# Patient Record
Sex: FEMALE | Race: WHITE | NOT HISPANIC OR LATINO
[De-identification: names, ages, dates, MRNs, and addresses within clinical notes are randomized per-mention and may not be internally consistent; named-entity substitution may affect disease eponyms.]

---

## 2017-01-06 ENCOUNTER — APPOINTMENT (OUTPATIENT)
Dept: VASCULAR SURGERY | Facility: CLINIC | Age: 77
End: 2017-01-06

## 2017-01-06 VITALS
HEART RATE: 83 BPM | WEIGHT: 180 LBS | BODY MASS INDEX: 28.25 KG/M2 | HEIGHT: 67 IN | SYSTOLIC BLOOD PRESSURE: 139 MMHG | DIASTOLIC BLOOD PRESSURE: 82 MMHG | TEMPERATURE: 99.1 F

## 2017-01-06 RX ORDER — HYDROCORTISONE 1 %
12 CREAM (GRAM) TOPICAL
Qty: 400 | Refills: 0 | Status: ACTIVE | COMMUNITY
Start: 2016-09-10

## 2017-01-06 RX ORDER — ALBUTEROL SULFATE 2.5 MG/3ML
(2.5 MG/3ML) SOLUTION RESPIRATORY (INHALATION)
Qty: 75 | Refills: 0 | Status: ACTIVE | COMMUNITY
Start: 2016-11-11

## 2017-01-06 RX ORDER — POTASSIUM CHLORIDE 1500 MG/1
20 TABLET, EXTENDED RELEASE ORAL
Qty: 90 | Refills: 0 | Status: ACTIVE | COMMUNITY
Start: 2016-08-28

## 2017-01-06 RX ORDER — CIPROFLOXACIN HYDROCHLORIDE 500 MG/1
500 TABLET, FILM COATED ORAL
Qty: 14 | Refills: 0 | Status: ACTIVE | COMMUNITY
Start: 2017-01-06 | End: 1900-01-01

## 2017-01-06 RX ORDER — FLUTICASONE PROPIONATE 110 UG/1
110 AEROSOL, METERED RESPIRATORY (INHALATION)
Qty: 12 | Refills: 0 | Status: ACTIVE | COMMUNITY
Start: 2017-01-05

## 2017-01-06 RX ORDER — METOPROLOL SUCCINATE 25 MG/1
25 TABLET, EXTENDED RELEASE ORAL
Qty: 90 | Refills: 0 | Status: ACTIVE | COMMUNITY
Start: 2016-08-28

## 2017-01-06 RX ORDER — FLUCONAZOLE 100 MG/1
100 TABLET ORAL
Qty: 3 | Refills: 0 | Status: ACTIVE | COMMUNITY
Start: 2016-09-10

## 2017-01-06 RX ORDER — DOXYCYCLINE HYCLATE 100 MG/1
100 CAPSULE ORAL
Qty: 20 | Refills: 0 | Status: DISCONTINUED | COMMUNITY
Start: 2016-09-10 | End: 2017-01-06

## 2017-01-06 RX ORDER — AZITHROMYCIN 250 MG/1
250 TABLET, FILM COATED ORAL
Qty: 2 | Refills: 0 | Status: DISCONTINUED | COMMUNITY
Start: 2016-11-01 | End: 2017-01-06

## 2017-01-11 ENCOUNTER — APPOINTMENT (OUTPATIENT)
Dept: VASCULAR SURGERY | Facility: CLINIC | Age: 77
End: 2017-01-11

## 2017-01-11 VITALS
HEIGHT: 67 IN | HEART RATE: 76 BPM | SYSTOLIC BLOOD PRESSURE: 129 MMHG | TEMPERATURE: 97.6 F | DIASTOLIC BLOOD PRESSURE: 78 MMHG | WEIGHT: 180 LBS | BODY MASS INDEX: 28.25 KG/M2

## 2017-01-11 DIAGNOSIS — L03.90 CELLULITIS, UNSPECIFIED: ICD-10-CM

## 2017-01-11 DIAGNOSIS — R60.0 LOCALIZED EDEMA: ICD-10-CM

## 2017-01-11 DIAGNOSIS — I83.009 VARICOSE VEINS OF UNSPECIFIED LOWER EXTREMITY WITH ULCER OF UNSPECIFIED SITE: ICD-10-CM

## 2017-01-18 ENCOUNTER — APPOINTMENT (OUTPATIENT)
Dept: VASCULAR SURGERY | Facility: CLINIC | Age: 77
End: 2017-01-18

## 2017-01-30 ENCOUNTER — APPOINTMENT (OUTPATIENT)
Dept: CT IMAGING | Facility: CLINIC | Age: 77
End: 2017-01-30

## 2017-01-30 ENCOUNTER — OUTPATIENT (OUTPATIENT)
Dept: OUTPATIENT SERVICES | Facility: HOSPITAL | Age: 77
LOS: 1 days | End: 2017-01-30
Payer: MEDICARE

## 2017-01-30 DIAGNOSIS — Z00.8 ENCOUNTER FOR OTHER GENERAL EXAMINATION: ICD-10-CM

## 2017-01-30 PROCEDURE — 71250 CT THORAX DX C-: CPT

## 2017-12-03 ENCOUNTER — INPATIENT (INPATIENT)
Facility: HOSPITAL | Age: 77
LOS: 1 days | Discharge: HOME | End: 2017-12-05
Attending: INTERNAL MEDICINE | Admitting: INTERNAL MEDICINE
Payer: MEDICARE

## 2017-12-03 VITALS
OXYGEN SATURATION: 98 % | DIASTOLIC BLOOD PRESSURE: 26 MMHG | HEART RATE: 80 BPM | RESPIRATION RATE: 16 BRPM | SYSTOLIC BLOOD PRESSURE: 78 MMHG | TEMPERATURE: 99 F

## 2017-12-03 DIAGNOSIS — I95.9 HYPOTENSION, UNSPECIFIED: ICD-10-CM

## 2017-12-03 LAB
ALBUMIN SERPL ELPH-MCNC: 2.9 G/DL — LOW (ref 3.3–5)
ALP SERPL-CCNC: 110 U/L — SIGNIFICANT CHANGE UP (ref 40–120)
ALT FLD-CCNC: 7 U/L — SIGNIFICANT CHANGE UP (ref 4–33)
APPEARANCE UR: CLEAR — SIGNIFICANT CHANGE UP
APTT BLD: 29.4 SEC — SIGNIFICANT CHANGE UP (ref 27.5–37.4)
AST SERPL-CCNC: 21 U/L — SIGNIFICANT CHANGE UP (ref 4–32)
BACTERIA # UR AUTO: SIGNIFICANT CHANGE UP
BASE EXCESS BLDV CALC-SCNC: 5.2 MMOL/L — SIGNIFICANT CHANGE UP
BASOPHILS # BLD AUTO: 0.01 K/UL — SIGNIFICANT CHANGE UP (ref 0–0.2)
BASOPHILS NFR BLD AUTO: 0.1 % — SIGNIFICANT CHANGE UP (ref 0–2)
BILIRUB SERPL-MCNC: 0.5 MG/DL — SIGNIFICANT CHANGE UP (ref 0.2–1.2)
BILIRUB UR-MCNC: NEGATIVE — SIGNIFICANT CHANGE UP
BLOOD GAS VENOUS - CREATININE: SIGNIFICANT CHANGE UP MG/DL (ref 0.5–1.3)
BLOOD UR QL VISUAL: NEGATIVE — SIGNIFICANT CHANGE UP
BUN SERPL-MCNC: 25 MG/DL — HIGH (ref 7–23)
CALCIUM SERPL-MCNC: 8.5 MG/DL — SIGNIFICANT CHANGE UP (ref 8.4–10.5)
CHLORIDE BLDV-SCNC: 101 MMOL/L — SIGNIFICANT CHANGE UP (ref 96–108)
CHLORIDE SERPL-SCNC: 99 MMOL/L — SIGNIFICANT CHANGE UP (ref 98–107)
CK MB BLD-MCNC: 1 NG/ML — SIGNIFICANT CHANGE UP (ref 1–4.7)
CK MB BLD-MCNC: SIGNIFICANT CHANGE UP (ref 0–2.5)
CK SERPL-CCNC: 30 U/L — SIGNIFICANT CHANGE UP (ref 25–170)
CO2 SERPL-SCNC: 29 MMOL/L — SIGNIFICANT CHANGE UP (ref 22–31)
COLOR SPEC: YELLOW — SIGNIFICANT CHANGE UP
CREAT SERPL-MCNC: 1.3 MG/DL — SIGNIFICANT CHANGE UP (ref 0.5–1.3)
EOSINOPHIL # BLD AUTO: 0.39 K/UL — SIGNIFICANT CHANGE UP (ref 0–0.5)
EOSINOPHIL NFR BLD AUTO: 4 % — SIGNIFICANT CHANGE UP (ref 0–6)
GAS PNL BLDV: 136 MMOL/L — SIGNIFICANT CHANGE UP (ref 136–146)
GLUCOSE BLDV-MCNC: 97 — SIGNIFICANT CHANGE UP (ref 70–99)
GLUCOSE SERPL-MCNC: 93 MG/DL — SIGNIFICANT CHANGE UP (ref 70–99)
GLUCOSE UR-MCNC: NEGATIVE — SIGNIFICANT CHANGE UP
HCO3 BLDV-SCNC: 27 MMOL/L — SIGNIFICANT CHANGE UP (ref 20–27)
HCT VFR BLD CALC: 36.4 % — SIGNIFICANT CHANGE UP (ref 34.5–45)
HCT VFR BLDV CALC: 35.6 % — SIGNIFICANT CHANGE UP (ref 34.5–45)
HGB BLD-MCNC: 11.5 G/DL — SIGNIFICANT CHANGE UP (ref 11.5–15.5)
HGB BLDV-MCNC: 11.6 G/DL — SIGNIFICANT CHANGE UP (ref 11.5–15.5)
IMM GRANULOCYTES # BLD AUTO: 0.04 # — SIGNIFICANT CHANGE UP
IMM GRANULOCYTES NFR BLD AUTO: 0.4 % — SIGNIFICANT CHANGE UP (ref 0–1.5)
INR BLD: 0.97 — SIGNIFICANT CHANGE UP (ref 0.88–1.17)
KETONES UR-MCNC: NEGATIVE — SIGNIFICANT CHANGE UP
LACTATE BLDV-MCNC: 2 MMOL/L — SIGNIFICANT CHANGE UP (ref 0.5–2)
LEUKOCYTE ESTERASE UR-ACNC: NEGATIVE — SIGNIFICANT CHANGE UP
LYMPHOCYTES # BLD AUTO: 1.12 K/UL — SIGNIFICANT CHANGE UP (ref 1–3.3)
LYMPHOCYTES # BLD AUTO: 11.5 % — LOW (ref 13–44)
MCHC RBC-ENTMCNC: 30 PG — SIGNIFICANT CHANGE UP (ref 27–34)
MCHC RBC-ENTMCNC: 31.6 % — LOW (ref 32–36)
MCV RBC AUTO: 95 FL — SIGNIFICANT CHANGE UP (ref 80–100)
MONOCYTES # BLD AUTO: 0.46 K/UL — SIGNIFICANT CHANGE UP (ref 0–0.9)
MONOCYTES NFR BLD AUTO: 4.7 % — SIGNIFICANT CHANGE UP (ref 2–14)
MUCOUS THREADS # UR AUTO: SIGNIFICANT CHANGE UP
NEUTROPHILS # BLD AUTO: 7.71 K/UL — HIGH (ref 1.8–7.4)
NEUTROPHILS NFR BLD AUTO: 79.3 % — HIGH (ref 43–77)
NITRITE UR-MCNC: NEGATIVE — SIGNIFICANT CHANGE UP
NRBC # FLD: 0 — SIGNIFICANT CHANGE UP
NT-PROBNP SERPL-SCNC: 394.5 PG/ML — SIGNIFICANT CHANGE UP
PCO2 BLDV: 54 MMHG — HIGH (ref 41–51)
PH BLDV: 7.37 PH — SIGNIFICANT CHANGE UP (ref 7.32–7.43)
PH UR: 6.5 — SIGNIFICANT CHANGE UP (ref 4.6–8)
PLATELET # BLD AUTO: 153 K/UL — SIGNIFICANT CHANGE UP (ref 150–400)
PMV BLD: 11.4 FL — SIGNIFICANT CHANGE UP (ref 7–13)
PO2 BLDV: 28 MMHG — LOW (ref 35–40)
POTASSIUM BLDV-SCNC: 3.8 MMOL/L — SIGNIFICANT CHANGE UP (ref 3.4–4.5)
POTASSIUM SERPL-MCNC: 4.3 MMOL/L — SIGNIFICANT CHANGE UP (ref 3.5–5.3)
POTASSIUM SERPL-SCNC: 4.3 MMOL/L — SIGNIFICANT CHANGE UP (ref 3.5–5.3)
PROT SERPL-MCNC: 6.4 G/DL — SIGNIFICANT CHANGE UP (ref 6–8.3)
PROT UR-MCNC: 10 — SIGNIFICANT CHANGE UP
PROTHROM AB SERPL-ACNC: 10.9 SEC — SIGNIFICANT CHANGE UP (ref 9.8–13.1)
RBC # BLD: 3.83 M/UL — SIGNIFICANT CHANGE UP (ref 3.8–5.2)
RBC # FLD: 15.2 % — HIGH (ref 10.3–14.5)
RBC CASTS # UR COMP ASSIST: SIGNIFICANT CHANGE UP (ref 0–?)
SAO2 % BLDV: 50.4 % — LOW (ref 60–85)
SODIUM SERPL-SCNC: 138 MMOL/L — SIGNIFICANT CHANGE UP (ref 135–145)
SP GR SPEC: 1.01 — SIGNIFICANT CHANGE UP (ref 1–1.03)
TROPONIN T SERPL-MCNC: < 0.06 NG/ML — SIGNIFICANT CHANGE UP (ref 0–0.06)
UROBILINOGEN FLD QL: NORMAL E.U. — SIGNIFICANT CHANGE UP (ref 0.1–0.2)
WBC # BLD: 9.73 K/UL — SIGNIFICANT CHANGE UP (ref 3.8–10.5)
WBC # FLD AUTO: 9.73 K/UL — SIGNIFICANT CHANGE UP (ref 3.8–10.5)
WBC UR QL: SIGNIFICANT CHANGE UP (ref 0–?)

## 2017-12-03 PROCEDURE — 71010: CPT | Mod: 26

## 2017-12-03 RX ORDER — FAMOTIDINE 10 MG/ML
20 INJECTION INTRAVENOUS ONCE
Qty: 0 | Refills: 0 | Status: COMPLETED | OUTPATIENT
Start: 2017-12-03 | End: 2017-12-03

## 2017-12-03 RX ORDER — DIPHENHYDRAMINE HCL 50 MG
25 CAPSULE ORAL ONCE
Qty: 0 | Refills: 0 | Status: COMPLETED | OUTPATIENT
Start: 2017-12-03 | End: 2017-12-03

## 2017-12-03 RX ORDER — SODIUM CHLORIDE 9 MG/ML
1000 INJECTION INTRAMUSCULAR; INTRAVENOUS; SUBCUTANEOUS ONCE
Qty: 0 | Refills: 0 | Status: DISCONTINUED | OUTPATIENT
Start: 2017-12-03 | End: 2017-12-03

## 2017-12-03 RX ORDER — SODIUM CHLORIDE 9 MG/ML
500 INJECTION INTRAMUSCULAR; INTRAVENOUS; SUBCUTANEOUS ONCE
Qty: 0 | Refills: 0 | Status: COMPLETED | OUTPATIENT
Start: 2017-12-03 | End: 2017-12-03

## 2017-12-03 RX ADMIN — Medication 100 MILLIGRAM(S): at 22:38

## 2017-12-03 RX ADMIN — Medication 25 MILLIGRAM(S): at 19:30

## 2017-12-03 RX ADMIN — SODIUM CHLORIDE 500 MILLILITER(S): 9 INJECTION INTRAMUSCULAR; INTRAVENOUS; SUBCUTANEOUS at 21:17

## 2017-12-03 RX ADMIN — FAMOTIDINE 20 MILLIGRAM(S): 10 INJECTION INTRAVENOUS at 19:30

## 2017-12-03 RX ADMIN — Medication 125 MILLIGRAM(S): at 19:30

## 2017-12-03 NOTE — ED PROVIDER NOTE - ATTENDING CONTRIBUTION TO CARE
Dr. Jj: I have personally performed a face to face bedside history and physical examination of this patient. I have discussed the history, examination, review of systems, assessment and plan of management with the resident. I have reviewed the electronic medical record and amended it to reflect my history, review of systems, physical exam, assessment and plan.    Attending Exam - Dr. Jj: GEN: well appearing, NAD  HEENT: +PERRL, EOMI  RESP: CTAB, no signs of resipiratory distress CV: s1s2 RRR ABD: soft/non tender/non distended  MSK: symmetric swelling to bilateral LE no deformities, normal range of motion, spine grossly normal NEURO: alert, non focal exam SKIN: diffuse blanching rash, non urticarial noted to extremities and torso.  Increased warmth over bilateral lower extremities.

## 2017-12-03 NOTE — ED PROVIDER NOTE - PROGRESS NOTE DETAILS
Gollogly: pt resting comfortably, reports pruritus has improved after benadryl. States rash has been improving over past few days. lungs clear and bnp not elevated - LE edema more likely 2/2 vascular insufficiency than CHF. Will give careful fluids. Patient's PMD aware of patient and that she is being admitted to hospital -- Анна

## 2017-12-03 NOTE — ED PROVIDER NOTE - MEDICAL DECISION MAKING DETAILS
76F with diffuse rash after exposure to IV contrast. Pt nontoxic appearing. No mucous membrane involvement. Rash likely allergic, not c/w anaphylaxis or SJS/TEN. Pt also with worsening swelling of legs, symmetric, likely 2/2 CHF. Presentation not consistent with DVT. Plan: ekg, cxr, labs, UA, antihistamines/steroids, reassess. 76F with diffuse rash after exposure to IV contrast. Pt nontoxic appearing. No mucous membrane involvement. Rash likely allergic, not c/w anaphylaxis or SJS/TEN. Pt also with worsening swelling of legs, symmetric, likely 2/2 CHF. Presentation not consistent with DVT. Plan: ekg, cxr, labs, UA, antihistamines/steroids, reassess.    Attending: Agree with above

## 2017-12-03 NOTE — ED PROVIDER NOTE - SKIN, MLM
Diffuse blanching macular rash on back/torso/arms/legs. No mucous membrane involvement. Neg Nikolsky sign. No abrasions, petechiae, ecchymosis, or crepitus.

## 2017-12-03 NOTE — ED PROVIDER NOTE - PMH
Bilateral leg edema  chronic  Cellulitis, leg  recurrent  COPD (chronic obstructive pulmonary disease)  Questionable  Hyperlipidemia    Hypertension    Lung nodule  Benign  MDS (myelodysplastic syndrome)    KRISTIAN on CPAP    Osteoarthritis    Osteoporosis    Oxygen dependent    Seizure  hx/o seizure 40 years a go,

## 2017-12-03 NOTE — ED ADULT NURSE NOTE - OBJECTIVE STATEMENT
received pt A&Ox3 in no apparent distress at this time. #20g IVL to L AC, bloods drawn and sent to the lab. no s/s of infiltration noted at this time. family and MD at bedside. swelling and redness noted to bi/lat lower ext. diffuse rash noted to trunk and 4 ext. medicated as per MD order. vss. cardiac monitor in place. dispo pending

## 2017-12-03 NOTE — ED PROVIDER NOTE - OBJECTIVE STATEMENT
76F h/o HLD, CHF on lasix, and chronic LE edema p/w generalized weakness since earlier today. Pt had CT abdomen 5 days ago with IV contrast to eval renal cyst, then developed diffuse itchy rash a day later. Has been taking benadryl. Pt attributes the rash to the IV contrast, can't think of any other new foods/detergents/exposures. No vomiting, difficulty breathing, or syncope. Pt with increasing swelling of legs b/l x several days. No fevers, no hx DVT/PE, no calf pain. Pt denies CP or SOB. No cough or abd pain or dysuria. 76F h/o HLD, CHF on lasix, and chronic LE edema p/w generalized weakness since earlier today. Pt had CT abdomen 5 days ago with IV contrast to eval renal cyst, then developed diffuse itchy rash a day later. Has been taking benadryl. Pt attributes the rash to the IV contrast, can't think of any other new foods/detergents/exposures. No vomiting, difficulty breathing, or syncope. Pt with increasing swelling of legs b/l x several days. No fevers, no hx DVT/PE, no calf pain. Pt denies CP or SOB. No cough or abd pain or dysuria.  Cardiology: Lam Fairbanks, PMD: Milvia Yao

## 2017-12-03 NOTE — ED ADULT TRIAGE NOTE - CHIEF COMPLAINT QUOTE
C/o generalized rash which develop after having CT with contrast x 4 days and lower extremities swelling and generalized weakness. x 1 day. Denies respiratory distress/throat swelling/sob. PMH HTN. HLD, cellulitis, arthritis

## 2017-12-03 NOTE — ED PROVIDER NOTE - CARE PLAN
Principal Discharge DX:	Hypotension  Secondary Diagnosis:	Pruritic rash  Secondary Diagnosis:	Cellulitis of lower extremity

## 2017-12-04 DIAGNOSIS — Z29.9 ENCOUNTER FOR PROPHYLACTIC MEASURES, UNSPECIFIED: ICD-10-CM

## 2017-12-04 DIAGNOSIS — L28.2 OTHER PRURIGO: ICD-10-CM

## 2017-12-04 DIAGNOSIS — L27.0 GENERALIZED SKIN ERUPTION DUE TO DRUGS AND MEDICAMENTS TAKEN INTERNALLY: ICD-10-CM

## 2017-12-04 DIAGNOSIS — I10 ESSENTIAL (PRIMARY) HYPERTENSION: ICD-10-CM

## 2017-12-04 DIAGNOSIS — D46.9 MYELODYSPLASTIC SYNDROME, UNSPECIFIED: ICD-10-CM

## 2017-12-04 DIAGNOSIS — R33.9 RETENTION OF URINE, UNSPECIFIED: ICD-10-CM

## 2017-12-04 DIAGNOSIS — G47.33 OBSTRUCTIVE SLEEP APNEA (ADULT) (PEDIATRIC): ICD-10-CM

## 2017-12-04 DIAGNOSIS — E78.5 HYPERLIPIDEMIA, UNSPECIFIED: ICD-10-CM

## 2017-12-04 DIAGNOSIS — R60.0 LOCALIZED EDEMA: ICD-10-CM

## 2017-12-04 DIAGNOSIS — J44.9 CHRONIC OBSTRUCTIVE PULMONARY DISEASE, UNSPECIFIED: ICD-10-CM

## 2017-12-04 DIAGNOSIS — R53.1 WEAKNESS: ICD-10-CM

## 2017-12-04 LAB
SPECIMEN SOURCE: SIGNIFICANT CHANGE UP
SPECIMEN SOURCE: SIGNIFICANT CHANGE UP

## 2017-12-04 PROCEDURE — 99223 1ST HOSP IP/OBS HIGH 75: CPT

## 2017-12-04 RX ORDER — ATORVASTATIN CALCIUM 80 MG/1
20 TABLET, FILM COATED ORAL AT BEDTIME
Qty: 0 | Refills: 0 | Status: DISCONTINUED | OUTPATIENT
Start: 2017-12-04 | End: 2017-12-05

## 2017-12-04 RX ORDER — ACETAMINOPHEN 500 MG
650 TABLET ORAL EVERY 6 HOURS
Qty: 0 | Refills: 0 | Status: DISCONTINUED | OUTPATIENT
Start: 2017-12-04 | End: 2017-12-05

## 2017-12-04 RX ORDER — FUROSEMIDE 40 MG
40 TABLET ORAL DAILY
Qty: 0 | Refills: 0 | Status: DISCONTINUED | OUTPATIENT
Start: 2017-12-04 | End: 2017-12-05

## 2017-12-04 RX ORDER — ENOXAPARIN SODIUM 100 MG/ML
40 INJECTION SUBCUTANEOUS EVERY 24 HOURS
Qty: 0 | Refills: 0 | Status: DISCONTINUED | OUTPATIENT
Start: 2017-12-04 | End: 2017-12-05

## 2017-12-04 RX ORDER — GABAPENTIN 400 MG/1
800 CAPSULE ORAL THREE TIMES A DAY
Qty: 0 | Refills: 0 | Status: DISCONTINUED | OUTPATIENT
Start: 2017-12-04 | End: 2017-12-05

## 2017-12-04 RX ORDER — PANTOPRAZOLE SODIUM 20 MG/1
40 TABLET, DELAYED RELEASE ORAL
Qty: 0 | Refills: 0 | Status: DISCONTINUED | OUTPATIENT
Start: 2017-12-04 | End: 2017-12-05

## 2017-12-04 RX ORDER — ALLOPURINOL 300 MG
100 TABLET ORAL DAILY
Qty: 0 | Refills: 0 | Status: DISCONTINUED | OUTPATIENT
Start: 2017-12-04 | End: 2017-12-05

## 2017-12-04 RX ORDER — CALAMINE AND ZINC OXIDE AND PHENOL 160; 10 MG/ML; MG/ML
1 LOTION TOPICAL DAILY
Qty: 0 | Refills: 0 | Status: DISCONTINUED | OUTPATIENT
Start: 2017-12-04 | End: 2017-12-05

## 2017-12-04 RX ORDER — URSODIOL 250 MG/1
300 TABLET, FILM COATED ORAL
Qty: 0 | Refills: 0 | Status: DISCONTINUED | OUTPATIENT
Start: 2017-12-04 | End: 2017-12-05

## 2017-12-04 RX ORDER — ALBUTEROL 90 UG/1
2 AEROSOL, METERED ORAL EVERY 6 HOURS
Qty: 0 | Refills: 0 | Status: DISCONTINUED | OUTPATIENT
Start: 2017-12-04 | End: 2017-12-05

## 2017-12-04 RX ADMIN — ATORVASTATIN CALCIUM 20 MILLIGRAM(S): 80 TABLET, FILM COATED ORAL at 22:26

## 2017-12-04 RX ADMIN — GABAPENTIN 800 MILLIGRAM(S): 400 CAPSULE ORAL at 15:42

## 2017-12-04 RX ADMIN — PANTOPRAZOLE SODIUM 40 MILLIGRAM(S): 20 TABLET, DELAYED RELEASE ORAL at 17:41

## 2017-12-04 RX ADMIN — URSODIOL 300 MILLIGRAM(S): 250 TABLET, FILM COATED ORAL at 17:42

## 2017-12-04 RX ADMIN — Medication 100 MILLIGRAM(S): at 15:41

## 2017-12-04 RX ADMIN — Medication 40 MILLIGRAM(S): at 16:15

## 2017-12-04 RX ADMIN — ENOXAPARIN SODIUM 40 MILLIGRAM(S): 100 INJECTION SUBCUTANEOUS at 15:41

## 2017-12-04 RX ADMIN — GABAPENTIN 800 MILLIGRAM(S): 400 CAPSULE ORAL at 22:26

## 2017-12-04 NOTE — H&P ADULT - PROBLEM SELECTOR PLAN 5
- Pt was not aware of this diagnosis  - She states that she follows up with her hematologist Dr. Johnston for iron deficiency anemia   - Current CBC within acceptable parameters, no indication for transfusion or further workup at this time

## 2017-12-04 NOTE — H&P ADULT - ASSESSMENT
76F hx COPD no longer on home O2, KRISTIAN never on CPAP, MDS, HTN, chronic b/l LE lymphedema, osteoarthritis, HTN, presents with generalized weakness and diffuse body rash from reaction to IV contrast.

## 2017-12-04 NOTE — H&P ADULT - NSHPOUTPATIENTPROVIDERS_GEN_ALL_CORE
Dr. Boyle (Pulm), Dr. Laureen Hernandez (vascular), Dr. Lam Fairbanks (cards) Dr. Yao (PMD), Dr. Boyle (Pulm), Dr. Laureen Hernandez (vascular), Dr. Lam Fairbanks (cards)

## 2017-12-04 NOTE — CONSULT NOTE ADULT - PROBLEM SELECTOR RECOMMENDATION 9
-Patient currently taking allopurinol and pravastatin, two medications well-known to cause morbilliform drug eruption.  Possibly secondary to IV contrast however extent of rash and duration without improvement suggest other etiology.  -Educated patient regarding possible etiologies of rash   -Start clobetasol ointment to AA (avoiding face, axilla, and groin) bid for 2 weeks  -Offered patient f/u with our Dermatology practice.  Patient would like to f/u with Dr. Jolynn Stoner.  Instructed patient to f/u with Dr. Stoner in 1 week of discharge.  -If not improving, would recommended skin bx at that time   -If admitted, will see patient tomorrow -Patient currently taking allopurinol and pravastatin, two medications well-known to cause morbilliform drug eruption.  Also started ursodiol in the past year and is possible etiology of drug eruption.  Possibly secondary to IV contrast however extent of rash and duration without improvement suggest other etiology.  -Educated patient regarding possible etiologies of rash   -Start clobetasol ointment to AA (avoiding face, axilla, and groin) bid for 2 weeks  -Offered patient f/u with our Dermatology practice.  Patient would like to f/u with Dr. Jolynn Stoner.  Instructed patient to f/u with Dr. Stoner in 1 week of discharge.  -If not improving, would recommended skin bx at that time   -If admitted, will see patient tomorrow -Patient currently taking allopurinol and pravastatin, two medications well-known to cause morbilliform drug eruption.  Also started ursodiol in the past year which could be potential source of drug eruption.  Possibly secondary to IV contrast however extent of rash and duration without improvement suggest other etiology.  -Educated patient regarding possible etiologies of rash   -Start clobetasol ointment to AA (avoiding face, axilla, and groin) bid for 2 weeks  -Offered patient f/u with our Dermatology practice.  Patient would like to f/u with Dr. Jolynn Stoner.  Instructed patient to f/u with Dr. Stoner in 1 week of discharge.  -If not improving, would recommended skin bx at that time   -If admitted, will see patient tomorrow

## 2017-12-04 NOTE — H&P ADULT - PROBLEM SELECTOR PLAN 4
- Does not appear cellulitic, but more consistent with chronic lipodermatosclerosis. Pt also reports improvement in appearance of LE  - Continue with Lasix 40mg daily, compression stockings  - Monitor off abx unless she starts to spike fevers

## 2017-12-04 NOTE — H&P ADULT - NSHPSOCIALHISTORY_GEN_ALL_CORE
Lives alone, no home health aides.  Son assists with grocery shopping and cooking.  Ambulates at baseline with a rolling walker.    Former smoker, quit 30 years ago, 1PPD x 20 years   No EtOH, NO illicit drug use  Former , now retired

## 2017-12-04 NOTE — H&P ADULT - PROBLEM SELECTOR PLAN 1
- Likely multifactorial in setting of overall deconditioning, progressive osteoarthritis, but I mainly suspect medication side effect from daily Benadryl use from her IV contrast reaction.    - Avoid further Benadryl use or sedating medications  - PT evaluation  - Pt is otherwise nonfocal on neurologic exam

## 2017-12-04 NOTE — H&P ADULT - PROBLEM SELECTOR PLAN 6
- Continue with Losartan for now, but closely monitor Cr as pt received IV contrast load - Hold Losartan for now as patient initially came to ER hypotensive

## 2017-12-04 NOTE — H&P ADULT - PROBLEM SELECTOR PLAN 8
- Pt denies diagnosis of COPD, however was sent home with oxygen during a previous admission  - Will continue with ProAir, no signs of acute exacerbation

## 2017-12-04 NOTE — H&P ADULT - NSHPPHYSICALEXAM_GEN_ALL_CORE
GENERAL: NAD, well-developed  HEAD:  Atraumatic, Normocephalic  EYES: EOMI, PERRLA, conjunctiva and sclera clear  NECK: Supple, No JVD  CHEST/LUNG: Clear to auscultation bilaterally; No wheeze  HEART: Regular rate and rhythm; No murmurs, rubs, or gallops  ABDOMEN: Soft, Nontender, Nondistended; Bowel sounds present  EXTREMITIES:  2+ Peripheral Pulses, No clubbing, cyanosis, or edema  PSYCH: AAOx3  NEUROLOGY: non-focal  SKIN: No rashes or lesions GENERAL: NAD, well-developed, obese  HEAD:  Atraumatic, Normocephalic  EYES: EOMI, PERRLA, conjunctiva and sclera clear  NECK: Supple, No JVD  CHEST/LUNG: Clear to auscultation bilaterally; No wheezes  HEART: Regular rate and rhythm; No murmurs, rubs, or gallops  ABDOMEN: Soft, Nontender, Nondistended; Bowel sounds present  EXTREMITIES:  No clubbing, cyanosis, 2+ pitting edema in b/l LE  PSYCH: AAOx3  NEUROLOGY: non-focal  SKIN: Diffuse erythematous rash, no wheals/hives appearance.

## 2017-12-04 NOTE — H&P ADULT - NSHPLABSRESULTS_GEN_ALL_CORE
LABS:                        11.5   9.73  )-----------( 153      ( 03 Dec 2017 19:00 )             36.4     12-    138  |  99  |  25<H>  ----------------------------<  93  4.3   |  29  |  1.30    Ca    8.5      03 Dec 2017 19:00    TPro  6.4  /  Alb  2.9<L>  /  TBili  0.5  /  DBili  x   /  AST  21  /  ALT  7   /  AlkPhos  110  12-    PT/INR - ( 03 Dec 2017 19:00 )   PT: 10.9 SEC;   INR: 0.97          PTT - ( 03 Dec 2017 19:00 )  PTT:29.4 SEC  CARDIAC MARKERS ( 03 Dec 2017 19:00 )  x     / < 0.06 ng/mL / 30 u/L / 1.00 ng/mL / x          Urinalysis Basic - ( 03 Dec 2017 21:16 )    Color: YELLOW / Appearance: CLEAR / S.015 / pH: 6.5  Gluc: NEGATIVE / Ketone: NEGATIVE  / Bili: NEGATIVE / Urobili: NORMAL E.U.   Blood: NEGATIVE / Protein: 10 / Nitrite: NEGATIVE   Leuk Esterase: NEGATIVE / RBC: 0-2 / WBC 0-2   Sq Epi: x / Non Sq Epi: x / Bacteria: FEW      Serum Pro-Brain Natriuretic Peptide (17 @ 19:00)    Serum Pro-Brain Natriuretic Peptide: 394.5: ACUTE CONGESTIVE HEART FAILURE is UNLIKELY if NT-proBNP is < 300 pg/mL.    CONSIDER ACUTE CONGESTIVE HEART FAILURE if:    AGE                NT-proBNP RESULT  ---                -------------  < 50 YEARS      >  450 pg/mL  50 - 75 YEARS      >  900 pg/mL  > 75 YEARS      > 1800 pg/mL    All results require clinical correlation.  Consider obtaining a  baseline or "dry" NT-proBNP level when the patient is stabilized,  so that subsequent levels can be compared to that value.  Patients  with recurrent CHF may have elevated NT-proBNP levels.  Acute  failure episodes generally produce levels at least 25% greater  than baseline levels.  The above values are derived from a large  multi-center international study, "William, CARSON, et al, European  Heart Journal, 2006; 27:330-337."   pg/mL    RADIOLOGY & ADDITIONAL TESTS:    Imaging Personally Reviewed:  < from: Xray Chest 1 View AP/PA (17 @ 20:28) >    IMPRESSION:   Small left pleural effusion with associated atelectasis unchanged from   the last exam.      < end of copied text >

## 2017-12-04 NOTE — H&P ADULT - PROBLEM SELECTOR PLAN 2
- Also likely from side effect from benadryl; this medication is highly known to cause urinary retention especially in elderly population  - Check bladder scan now  - If retaining, would straight cath  - Avoid Benadryl use

## 2017-12-04 NOTE — H&P ADULT - HISTORY OF PRESENT ILLNESS
76F hx COPD on home O2 (2L NC), KRISTIAN, MDS, HTN, chronic b/l LE lymphedema, HTN, presents with generalized weakness and worsening b/l LE edema.     In ER vitals signs remarkable for BP 78/26, which improved to 103/55 after 500 cc NS bolus.    Pt received: Solumedrol 125 IV x1, Pepcid 20mg IV x1 and Benadryl 25 mg IV x1 76F hx COPD on home O2 (2L NC), KRISTIAN, MDS, HTN, chronic b/l LE lymphedema, HTN, presents with generalized weakness and worsening b/l LE edema.     In ER vitals signs remarkable for BP 78/26, which improved to 103/55 after 500 cc NS bolus.    Pt received: Solumedrol 125 IV x1, Pepcid 20mg IV x1, Benadryl 25 mg IV x1 and Clindamycin 600mg IV x1 76F hx COPD no longer on home O2, KRISTIAN never on CPAP, MDS, HTN, chronic b/l LE lymphedema, osteoarthritis, HTN, presents with generalized weakness and diffuse body rash.  Pt reports that she was trying to get herself out of bed yesterday, but was unable to support her weight.  At baseline, she's able to prop herself up from her reclining chair to her walker, but for the past two days, she's become extremely weak.  Denies dizziness, shortness of breath, chest pain, or palpitations.  Of note, she had a CT A/P with IV contrast performed last Tuesday to evaluate a renal cyst, and then noticed a diffuse body rash with itching the next day.  Denies any wheezing or airway compromise associated with the rash and no other new medications were started.  She has been taking benadryl at home to ease the itching.  She also notes inability to initiate a urinary stream.  She feels the urge to urinate, but when she sits on the toilet or bed pan, she's unable to start a stream and she feels discomfort in her bladder, but denies overt dysuria.  She's been having regular BMs, nonbloody, no melena.  Denies fevers/chills at home.  Pt believes that the swelling in her lower extremities appears much improved than her usual baseline and has been compliant with daily Lasix.  Pt lives alone; her son helps with grocery shopping and cooking.  Pt does not want to go to [facility], but is willing to entertain the option.      In ER vitals signs remarkable for BP 78/26, which improved to 103/55 after 500 cc NS bolus.    Pt received: Solumedrol 125 IV x1, Pepcid 20mg IV x1, Benadryl 25 mg IV x1 and Clindamycin 600mg IV x1

## 2017-12-04 NOTE — ED ADULT NURSE REASSESSMENT NOTE - NS ED NURSE REASSESS COMMENT FT1
pt endorsed AAOx3. pt admitted to Medicine awaiting at bed. pt will go to [facility]. Report given to Marah CARLIN. VSS as charted.

## 2017-12-04 NOTE — H&P ADULT - PROBLEM SELECTOR PLAN 3
- Likely IV contrast reaction (now listed as a true allergy on her list)  - Pt received Solumedrol 125mg IV x1 with symptomatic relief.  Will monitor off further steroids as Solumedrol 125 is a large dose.   - Will call dermatology consult   - Supportive care, no signs airway compromise, calamine lotion for itching  - NO Benadryl use

## 2017-12-04 NOTE — CONSULT NOTE ADULT - SUBJECTIVE AND OBJECTIVE BOX
HPI:  77 yo F with Hx of COPD/KRISTIAN, chronic b/l LE lymphedema, osteoarthritis, HTN admitted for generalized weakness and diffuse body rash.  Rash started day after getting CT A/P with IV contrast performed last Tuesday () to evaluate a renal cyst.  She woke up the next morning with a diffuse pruritic body rash.  No lip swelling, shortness of breath, or abdominal pain associated with the rash.  Denies starting any new medications.  Has not taken antibiotics in the past few months.  She has been taking benadryl at home to ease the itching.   Also with urinary retention and muscle weakness.  No fevers or chills.    Has edema of lower extremities at baseline for which she takes Lasix which has been helping.  In ER vitals signs remarkable for BP 78/26, which improved to 103/55 after 500 cc NS bolus.  Rec'd Solumedrol 125 IV x1, Pepcid 20mg IV x1, Benadryl 25 mg IV x1 and clindamycin 600mg IV x1.      PAST MEDICAL & SURGICAL HISTORY:  COPD (chronic obstructive pulmonary disease): Questionable  Seizure: hx/o seizure 40 years a go,  Oxygen dependent  KRISTIAN on CPAP  Osteoporosis  Osteoarthritis  Hyperlipidemia  Hypertension  Lung nodule: Benign  Bilateral leg edema: chronic  Cellulitis, leg: recurrent  MDS (myelodysplastic syndrome)  S/P tonsillectomy  S/P hammer toe correction  S/P TKR (total knee replacement)      REVIEW OF SYSTEMS      General: no fevers/chills, no lethargy	    Skin/Breast: see HPI  	  Ophthalmologic: no eye pain or change in vision  	  ENMT: no dysphagia or change in hearing    Respiratory and Thorax: no SOB or cough  	  Cardiovascular: no palpitations or chest pain    Gastrointestinal: no abdomenal pain or blood in stool     Genitourinary: no dysuria or frequency    Musculoskeletal: no joint pains or weakness	    Neurological:no weakness, numbness , or tingling    MEDICATIONS  (STANDING):  allopurinol 100 milliGRAM(s) Oral daily  atorvastatin 20 milliGRAM(s) Oral at bedtime  calamine Lotion 1 Application(s) Topical daily  enoxaparin Injectable 40 milliGRAM(s) SubCutaneous every 24 hours  furosemide    Tablet 40 milliGRAM(s) Oral daily  gabapentin 800 milliGRAM(s) Oral three times a day    MEDICATIONS  (PRN):  acetaminophen   Tablet 650 milliGRAM(s) Oral every 6 hours PRN For Temp greater than 38 C (100.4 F)  ALBUTerol    90 MICROgram(s) HFA Inhaler 2 Puff(s) Inhalation every 6 hours PRN Shortness of Breath and/or Wheezing      Allergies    contrast media (iodine-based) (Rash)    Intolerances        SOCIAL HISTORY:    FAMILY HISTORY:  No pertinent family history in first degree relatives      Vital Signs Last 24 Hrs  T(C): 36.5 (04 Dec 2017 05:21), Max: 37.2 (03 Dec 2017 18:38)  T(F): 97.7 (04 Dec 2017 05:21), Max: 98.9 (03 Dec 2017 18:38)  HR: 69 (04 Dec 2017 10:01) (65 - 80)  BP: 145/49 (04 Dec 2017 10:01) (78/26 - 145/49)  BP(mean): --  RR: 17 (04 Dec 2017 10:01) (16 - 17)  SpO2: 95% (04 Dec 2017 10:01) (93% - 100%)    PHYSICAL EXAM:     The patient was alert and oriented X 3, well nourished, and in no  apparent distress.  OP showed no ulcerations  There was no visible lymphadenopathy.  Conjunctiva were non injected  There was no clubbing or edema of extremities.  The scalp, hair, face, eyebrows, lips, OP, neck, chest, back,   extremities X 4, nails were examined.  There was no hyperhidrosis or bromhidrosis.    Of note on skin exam:       LABS:                        11.5   9.73  )-----------( 153      ( 03 Dec 2017 19:00 )             36.4     12-03    138  |  99  |  25<H>  ----------------------------<  93  4.3   |  29  |  1.30    Ca    8.5      03 Dec 2017 19:00    TPro  6.4  /  Alb  2.9<L>  /  TBili  0.5  /  DBili  x   /  AST  21  /  ALT  7   /  AlkPhos  110  12-03    PT/INR - ( 03 Dec 2017 19:00 )   PT: 10.9 SEC;   INR: 0.97          PTT - ( 03 Dec 2017 19:00 )  PTT:29.4 SEC  Urinalysis Basic - ( 03 Dec 2017 21:16 )    Color: YELLOW / Appearance: CLEAR / S.015 / pH: 6.5  Gluc: NEGATIVE / Ketone: NEGATIVE  / Bili: NEGATIVE / Urobili: NORMAL E.U.   Blood: NEGATIVE / Protein: 10 / Nitrite: NEGATIVE   Leuk Esterase: NEGATIVE / RBC: 0-2 / WBC 0-2   Sq Epi: x / Non Sq Epi: x / Bacteria: FEW    Josefina Webb MD (PGY-2)   Dermatology Resident  [facility]  Pager: [phone]  Office [phone] HPI:  75 yo F with Hx of COPD/KRISTIAN, chronic b/l LE lymphedema, osteoarthritis, HTN admitted for generalized weakness and diffuse body rash.  Rash started day after getting CT A/P with IV contrast performed last Tuesday () to evaluate a renal cyst.  She woke up the next morning with a diffuse pruritic body rash.  No lip swelling, shortness of breath, or abdominal pain associated with the rash.  Denies starting any new medications.  Has not taken antibiotics in the past few months.  She has been taking benadryl at home to ease the itching.   Also with urinary retention and muscle weakness.  No fevers or chills.    Has edema of lower extremities at baseline for which she takes Lasix which has been helping.  In ER vitals signs remarkable for BP 78/26, which improved to 103/55 after 500 cc NS bolus.  Rec'd Solumedrol 125 IV x1, Pepcid 20mg IV x1, Benadryl 25 mg IV x1 and clindamycin 600mg IV x1.      PAST MEDICAL & SURGICAL HISTORY:  COPD (chronic obstructive pulmonary disease): Questionable  Seizure: hx/o seizure 40 years a go,  Oxygen dependent  KRISTIAN on CPAP  Osteoporosis  Osteoarthritis  Hyperlipidemia  Hypertension  Lung nodule: Benign  Bilateral leg edema: chronic  Cellulitis, leg: recurrent  MDS (myelodysplastic syndrome)  S/P tonsillectomy  S/P hammer toe correction  S/P TKR (total knee replacement)      REVIEW OF SYSTEMS      General: no fevers/chills, no lethargy	    Skin/Breast: see HPI  	  Ophthalmologic: no eye pain or change in vision  	  ENMT: no dysphagia or change in hearing    Respiratory and Thorax: no SOB or cough  	  Cardiovascular: no palpitations or chest pain    Gastrointestinal: no abdomenal pain or blood in stool     Genitourinary: no dysuria or frequency    Musculoskeletal: no joint pains or weakness	    Neurological:no weakness, numbness , or tingling    MEDICATIONS  (STANDING):  allopurinol 100 milliGRAM(s) Oral daily  atorvastatin 20 milliGRAM(s) Oral at bedtime  calamine Lotion 1 Application(s) Topical daily  enoxaparin Injectable 40 milliGRAM(s) SubCutaneous every 24 hours  furosemide    Tablet 40 milliGRAM(s) Oral daily  gabapentin 800 milliGRAM(s) Oral three times a day    MEDICATIONS  (PRN):  acetaminophen   Tablet 650 milliGRAM(s) Oral every 6 hours PRN For Temp greater than 38 C (100.4 F)  ALBUTerol    90 MICROgram(s) HFA Inhaler 2 Puff(s) Inhalation every 6 hours PRN Shortness of Breath and/or Wheezing      Allergies    contrast media (iodine-based) (Rash)    Intolerances        SOCIAL HISTORY:    FAMILY HISTORY:  No pertinent family history in first degree relatives      Vital Signs Last 24 Hrs  T(C): 36.5 (04 Dec 2017 05:21), Max: 37.2 (03 Dec 2017 18:38)  T(F): 97.7 (04 Dec 2017 05:21), Max: 98.9 (03 Dec 2017 18:38)  HR: 69 (04 Dec 2017 10:01) (65 - 80)  BP: 145/49 (04 Dec 2017 10:01) (78/26 - 145/49)  BP(mean): --  RR: 17 (04 Dec 2017 10:01) (16 - 17)  SpO2: 95% (04 Dec 2017 10:01) (93% - 100%)    PHYSICAL EXAM:     The patient was alert and oriented X 3, well nourished, and in no  apparent distress.  OP showed no ulcerations  There was no visible lymphadenopathy.  Conjunctiva were non injected  There was no clubbing or edema of extremities.  The scalp, hair, face, eyebrows, lips, OP, neck, chest, back,   extremities X 4, nails were examined.  There was no hyperhidrosis or bromhidrosis.    Of note on skin exam: +erythroderma, erythematous macules coalescing into large patches on ears, trunk, extremities, no mucosal involvement or lymphadenopathy       LABS:                        11.5   9.73  )-----------( 153      ( 03 Dec 2017 19:00 )             36.4     12-03    138  |  99  |  25<H>  ----------------------------<  93  4.3   |  29  |  1.30    Ca    8.5      03 Dec 2017 19:00    TPro  6.4  /  Alb  2.9<L>  /  TBili  0.5  /  DBili  x   /  AST  21  /  ALT  7   /  AlkPhos  110  12-03    PT/INR - ( 03 Dec 2017 19:00 )   PT: 10.9 SEC;   INR: 0.97          PTT - ( 03 Dec 2017 19:00 )  PTT:29.4 SEC  Urinalysis Basic - ( 03 Dec 2017 21:16 )    Color: YELLOW / Appearance: CLEAR / S.015 / pH: 6.5  Gluc: NEGATIVE / Ketone: NEGATIVE  / Bili: NEGATIVE / Urobili: NORMAL E.U.   Blood: NEGATIVE / Protein: 10 / Nitrite: NEGATIVE   Leuk Esterase: NEGATIVE / RBC: 0-2 / WBC 0-2   Sq Epi: x / Non Sq Epi: x / Bacteria: FEW    Josefina Webb MD (PGY-2)   Dermatology Resident  [facility]  Pager: [phone]  Office [phone] HPI:  77 yo F with Hx of COPD/KRISTIAN, chronic b/l LE lymphedema, osteoarthritis, HTN admitted for generalized weakness and diffuse body rash.  Rash started day after getting CT A/P with IV contrast performed last Tuesday () to evaluate a renal cyst.  She woke up the next morning with a diffuse pruritic body rash.  No lip swelling, shortness of breath, or abdominal pain associated with the rash.  Denies starting any new medications recently.  Started ursodiol in the past year, but all other medications she reports that she has been on for many years.   Has not taken antibiotics in the past few months.  She has been taking benadryl at home to ease the itching.   Also with urinary retention and muscle weakness.  No fevers or chills.    Has edema of lower extremities at baseline for which she takes Lasix which has been helping.  In ER vitals signs remarkable for BP 78/26, which improved to 103/55 after 500 cc NS bolus.  Rec'd Solumedrol 125 IV x1, Pepcid 20mg IV x1, Benadryl 25 mg IV x1 and clindamycin 600mg IV x1.      PAST MEDICAL & SURGICAL HISTORY:  COPD (chronic obstructive pulmonary disease): Questionable  Seizure: hx/o seizure 40 years a go,  Oxygen dependent  KRISTIAN on CPAP  Osteoporosis  Osteoarthritis  Hyperlipidemia  Hypertension  Lung nodule: Benign  Bilateral leg edema: chronic  Cellulitis, leg: recurrent  MDS (myelodysplastic syndrome)  S/P tonsillectomy  S/P hammer toe correction  S/P TKR (total knee replacement)      REVIEW OF SYSTEMS      General: no fevers/chills, no lethargy	    Skin/Breast: see HPI  	  Ophthalmologic: no eye pain or change in vision  	  ENMT: no dysphagia or change in hearing    Respiratory and Thorax: no SOB or cough  	  Cardiovascular: no palpitations or chest pain    Gastrointestinal: no abdomenal pain or blood in stool     Genitourinary: no dysuria or frequency    Musculoskeletal: no joint pains or weakness	    Neurological:no weakness, numbness , or tingling    MEDICATIONS  (STANDING):  allopurinol 100 milliGRAM(s) Oral daily  atorvastatin 20 milliGRAM(s) Oral at bedtime  calamine Lotion 1 Application(s) Topical daily  enoxaparin Injectable 40 milliGRAM(s) SubCutaneous every 24 hours  furosemide    Tablet 40 milliGRAM(s) Oral daily  gabapentin 800 milliGRAM(s) Oral three times a day    MEDICATIONS  (PRN):  acetaminophen   Tablet 650 milliGRAM(s) Oral every 6 hours PRN For Temp greater than 38 C (100.4 F)  ALBUTerol    90 MICROgram(s) HFA Inhaler 2 Puff(s) Inhalation every 6 hours PRN Shortness of Breath and/or Wheezing      Allergies    contrast media (iodine-based) (Rash)    Intolerances        SOCIAL HISTORY:    FAMILY HISTORY:  No pertinent family history in first degree relatives      Vital Signs Last 24 Hrs  T(C): 36.5 (04 Dec 2017 05:21), Max: 37.2 (03 Dec 2017 18:38)  T(F): 97.7 (04 Dec 2017 05:21), Max: 98.9 (03 Dec 2017 18:38)  HR: 69 (04 Dec 2017 10:) (65 - 80)  BP: 145/49 (04 Dec 2017 10:01) (78/26 - 145/49)  BP(mean): --  RR: 17 (04 Dec 2017 10:01) (16 - 17)  SpO2: 95% (04 Dec 2017 10:) (93% - 100%)    PHYSICAL EXAM:     The patient was alert and oriented X 3, well nourished, and in no  apparent distress.  OP showed no ulcerations  There was no visible lymphadenopathy.  Conjunctiva were non injected  There was no clubbing or edema of extremities.  The scalp, hair, face, eyebrows, lips, OP, neck, chest, back,   extremities X 4, nails were examined.  There was no hyperhidrosis or bromhidrosis.    Of note on skin exam: +erythroderma, erythematous macules coalescing into large patches on ears, trunk, extremities, no mucosal involvement or lymphadenopathy       LABS:                        11.5   9.73  )-----------( 153      ( 03 Dec 2017 19:00 )             36.4     12-03    138  |  99  |  25<H>  ----------------------------<  93  4.3   |  29  |  1.30    Ca    8.5      03 Dec 2017 19:00    TPro  6.4  /  Alb  2.9<L>  /  TBili  0.5  /  DBili  x   /  AST  21  /  ALT  7   /  AlkPhos  110  12-03    PT/INR - ( 03 Dec 2017 19:00 )   PT: 10.9 SEC;   INR: 0.97          PTT - ( 03 Dec 2017 19:00 )  PTT:29.4 SEC  Urinalysis Basic - ( 03 Dec 2017 21:16 )    Color: YELLOW / Appearance: CLEAR / S.015 / pH: 6.5  Gluc: NEGATIVE / Ketone: NEGATIVE  / Bili: NEGATIVE / Urobili: NORMAL E.U.   Blood: NEGATIVE / Protein: 10 / Nitrite: NEGATIVE   Leuk Esterase: NEGATIVE / RBC: 0-2 / WBC 0-2   Sq Epi: x / Non Sq Epi: x / Bacteria: FEW    Josefina Webb MD (PGY-2)   Dermatology Resident  [facility]  Pager: [phone]  Office [phone]

## 2017-12-04 NOTE — CONSULT NOTE ADULT - ASSESSMENT
Drug eruption 2/2 medication vs. IV contrast 75 yo F with erythroderma likely secondary to drug eruption 2/2 medication vs. IV contrast 75 yo F with pruritic macular erythematous eruption likely secondary to drug eruption 2/2 medication vs. IV contrast

## 2017-12-04 NOTE — CONSULT NOTE ADULT - ATTENDING COMMENTS
Patient seen and examined with Dr. Oquendo. Agree with drug reaction vs IV contrast reaction. Given timing, contrast reaction is more likely, although unusual to persist for one week without significant improvement. Recommend outpatient dermatology follow-up in 1 week. She prefers to see Dr. Stoner. We discussed the possibility of a drug eruption to allopurinol, pravastatin, or ursodiol. She verbalizes understanding and would like to defer any further evaluation until she sees Dr. Stoner. In the interim, recommend clobetasol ointment to the trunk and extremities BID for up to 2 weeks.

## 2017-12-04 NOTE — H&P ADULT - NSHPREVIEWOFSYSTEMS_GEN_ALL_CORE
CONSTITUTIONAL: No fever, weight loss, or fatigue  EYES: No eye pain, visual disturbances, or discharge  ENMT:  No difficulty hearing, tinnitus, vertigo; No sinus or throat pain  NECK: No pain or stiffness  BREASTS: No pain, masses, or nipple discharge  RESPIRATORY: No cough, wheezing, chills or hemoptysis; No shortness of breath  CARDIOVASCULAR: No chest pain, palpitations, dizziness, or leg swelling  GASTROINTESTINAL: No abdominal or epigastric pain. No nausea, vomiting, or hematemesis; No diarrhea or constipation. No melena or hematochezia.  GENITOURINARY: No dysuria, frequency, hematuria, or incontinence  NEUROLOGICAL: No headaches, memory loss, loss of strength, numbness, or tremors  SKIN: No itching, burning, rashes, or lesions   LYMPH NODES: No enlarged glands  ENDOCRINE: No heat or cold intolerance; No hair loss  MUSCULOSKELETAL: No joint pain or swelling; No muscle, back, or extremity pain  PSYCHIATRIC: No depression, anxiety, mood swings, or difficulty sleeping  HEME/LYMPH: No easy bruising, or bleeding gums  ALLERY AND IMMUNOLOGIC: No hives or eczema CONSTITUTIONAL: No fever, +profound weakness  EYES: No eye pain, visual disturbances, or discharge  NECK: No pain or stiffness  RESPIRATORY: No cough, wheezing, chills or hemoptysis; No shortness of breath  CARDIOVASCULAR: No chest pain, palpitations, dizziness, +chronic leg swelling  GASTROINTESTINAL: No abdominal or epigastric pain. No nausea, vomiting, or hematemesis; No diarrhea or constipation. No melena or hematochezia.  GENITOURINARY: No dysuria, +urgency and inability to initiate urinary stream   NEUROLOGICAL: No headaches, memory loss, loss of strength, numbness, or tremors  SKIN: +diffuse, itchy, red rash   MUSCULOSKELETAL: No joint pain, +weakness

## 2017-12-05 ENCOUNTER — TRANSCRIPTION ENCOUNTER (OUTPATIENT)
Age: 77
End: 2017-12-05

## 2017-12-05 VITALS
HEART RATE: 70 BPM | DIASTOLIC BLOOD PRESSURE: 49 MMHG | RESPIRATION RATE: 18 BRPM | OXYGEN SATURATION: 97 % | SYSTOLIC BLOOD PRESSURE: 112 MMHG | TEMPERATURE: 99 F

## 2017-12-05 LAB
BACTERIA UR CULT: SIGNIFICANT CHANGE UP
BUN SERPL-MCNC: 27 MG/DL — HIGH (ref 7–23)
CALCIUM SERPL-MCNC: 8.6 MG/DL — SIGNIFICANT CHANGE UP (ref 8.4–10.5)
CHLORIDE SERPL-SCNC: 108 MMOL/L — HIGH (ref 98–107)
CO2 SERPL-SCNC: 27 MMOL/L — SIGNIFICANT CHANGE UP (ref 22–31)
CREAT SERPL-MCNC: 1.1 MG/DL — SIGNIFICANT CHANGE UP (ref 0.5–1.3)
GLUCOSE SERPL-MCNC: 94 MG/DL — SIGNIFICANT CHANGE UP (ref 70–99)
HCT VFR BLD CALC: 34.2 % — LOW (ref 34.5–45)
HGB BLD-MCNC: 10.9 G/DL — LOW (ref 11.5–15.5)
MAGNESIUM SERPL-MCNC: 2.2 MG/DL — SIGNIFICANT CHANGE UP (ref 1.6–2.6)
MCHC RBC-ENTMCNC: 30.6 PG — SIGNIFICANT CHANGE UP (ref 27–34)
MCHC RBC-ENTMCNC: 31.9 % — LOW (ref 32–36)
MCV RBC AUTO: 96.1 FL — SIGNIFICANT CHANGE UP (ref 80–100)
NRBC # FLD: 0 — SIGNIFICANT CHANGE UP
PHOSPHATE SERPL-MCNC: 3 MG/DL — SIGNIFICANT CHANGE UP (ref 2.5–4.5)
PLATELET # BLD AUTO: 113 K/UL — LOW (ref 150–400)
PMV BLD: 11.4 FL — SIGNIFICANT CHANGE UP (ref 7–13)
POTASSIUM SERPL-MCNC: 3.7 MMOL/L — SIGNIFICANT CHANGE UP (ref 3.5–5.3)
POTASSIUM SERPL-SCNC: 3.7 MMOL/L — SIGNIFICANT CHANGE UP (ref 3.5–5.3)
RBC # BLD: 3.56 M/UL — LOW (ref 3.8–5.2)
RBC # FLD: 14.6 % — HIGH (ref 10.3–14.5)
SODIUM SERPL-SCNC: 148 MMOL/L — HIGH (ref 135–145)
SPECIMEN SOURCE: SIGNIFICANT CHANGE UP
WBC # BLD: 6.45 K/UL — SIGNIFICANT CHANGE UP (ref 3.8–10.5)
WBC # FLD AUTO: 6.45 K/UL — SIGNIFICANT CHANGE UP (ref 3.8–10.5)

## 2017-12-05 RX ORDER — GABAPENTIN 400 MG/1
1 CAPSULE ORAL
Qty: 0 | Refills: 0 | COMMUNITY
Start: 2017-12-05

## 2017-12-05 RX ORDER — FUROSEMIDE 40 MG
1 TABLET ORAL
Qty: 0 | Refills: 0 | COMMUNITY
Start: 2017-12-05

## 2017-12-05 RX ORDER — CALAMINE AND ZINC OXIDE AND PHENOL 160; 10 MG/ML; MG/ML
1 LOTION TOPICAL
Qty: 0 | Refills: 0 | COMMUNITY
Start: 2017-12-05

## 2017-12-05 RX ORDER — ALLOPURINOL 300 MG
1 TABLET ORAL
Qty: 0 | Refills: 0 | COMMUNITY
Start: 2017-12-05

## 2017-12-05 RX ORDER — OXYCODONE HYDROCHLORIDE 5 MG/1
1 TABLET ORAL
Qty: 0 | Refills: 0 | COMMUNITY

## 2017-12-05 RX ORDER — BUDESONIDE AND FORMOTEROL FUMARATE DIHYDRATE 160; 4.5 UG/1; UG/1
2 AEROSOL RESPIRATORY (INHALATION)
Qty: 1 | Refills: 0 | OUTPATIENT
Start: 2017-12-05

## 2017-12-05 RX ORDER — BUDESONIDE AND FORMOTEROL FUMARATE DIHYDRATE 160; 4.5 UG/1; UG/1
2 AEROSOL RESPIRATORY (INHALATION)
Qty: 0 | Refills: 0 | Status: DISCONTINUED | OUTPATIENT
Start: 2017-12-05 | End: 2017-12-05

## 2017-12-05 RX ORDER — HYDROXYZINE HCL 10 MG
10 TABLET ORAL ONCE
Qty: 0 | Refills: 0 | Status: COMPLETED | OUTPATIENT
Start: 2017-12-05 | End: 2017-12-05

## 2017-12-05 RX ORDER — ACETAMINOPHEN 500 MG
2 TABLET ORAL
Qty: 0 | Refills: 0 | COMMUNITY
Start: 2017-12-05

## 2017-12-05 RX ADMIN — URSODIOL 300 MILLIGRAM(S): 250 TABLET, FILM COATED ORAL at 09:45

## 2017-12-05 RX ADMIN — Medication 10 MILLIGRAM(S): at 14:13

## 2017-12-05 RX ADMIN — CALAMINE AND ZINC OXIDE AND PHENOL 1 APPLICATION(S): 160; 10 LOTION TOPICAL at 14:10

## 2017-12-05 RX ADMIN — Medication 40 MILLIGRAM(S): at 07:52

## 2017-12-05 RX ADMIN — URSODIOL 300 MILLIGRAM(S): 250 TABLET, FILM COATED ORAL at 14:09

## 2017-12-05 RX ADMIN — Medication 100 MILLIGRAM(S): at 14:10

## 2017-12-05 RX ADMIN — GABAPENTIN 800 MILLIGRAM(S): 400 CAPSULE ORAL at 14:09

## 2017-12-05 RX ADMIN — ENOXAPARIN SODIUM 40 MILLIGRAM(S): 100 INJECTION SUBCUTANEOUS at 14:09

## 2017-12-05 RX ADMIN — PANTOPRAZOLE SODIUM 40 MILLIGRAM(S): 20 TABLET, DELAYED RELEASE ORAL at 06:24

## 2017-12-05 RX ADMIN — GABAPENTIN 800 MILLIGRAM(S): 400 CAPSULE ORAL at 06:15

## 2017-12-05 NOTE — DISCHARGE NOTE ADULT - HOSPITAL COURSE
77 yo F with pruritic macular erythematous eruption likely secondary to drug eruption 2/2 medication vs. IV contrast     Weakness.    - Avoid further Benadryl use or sedating medications  - PT evaluation    Urinary retention  -likely from side effect from benadryl  straight cath x 1  lasix started  - Avoid Benadryl use.     Pruritic rash  -Solumedrol 125mg IV x1 with symptomatic relief.  Will monitor off further steroids as Solumedrol 125 is a large dose.   -Start clobetasol ointment to AA (avoiding face, axilla, and groin) bid for 2 weeks  -Offered patient f/u with our Dermatology practice.  Patient would like to f/u with Dr. Jolynn Stoner.  Instructed patient to f/u with Dr. Stoner in 1 week of discharge.    Bilateral leg edema  - Continue with Lasix 40mg daily, compression stockings    MDS  - f/u with hematologist Dr. Johnston for iron deficiency anemia     HTN  - Hold Losartan for now as patient initially came to ER hypotensive. -     COPD  - Pt denies diagnosis of COPD, however was sent home with oxygen during a previous admission  - Will continue with ProAir, no signs of acute exacerbation.   -KRISTIAN on BIPAP - pt noncompliant

## 2017-12-05 NOTE — DISCHARGE NOTE ADULT - MEDICATION SUMMARY - MEDICATIONS TO TAKE
I will START or STAY ON the medications listed below when I get home from the hospital:    acetaminophen 325 mg oral tablet  -- 2 tab(s) by mouth every 6 hours, As needed, For Temp greater than 38 C (100.4 F)  -- Indication: For Pain    gabapentin 800 mg oral tablet  -- 1 tab(s) by mouth 3 times a day  -- Indication: For Pain    allopurinol 100 mg oral tablet  -- 1 tab(s) by mouth once a day  -- Indication: For Pain    pravastatin 80 mg oral tablet  -- 1 tab(s) by mouth once a day (at bedtime)  -- Indication: For Cholesterol    ProAir HFA 90 mcg/inh inhalation aerosol  -- 2 puff(s) inhaled 4 times a day, As Needed  -- Indication: For Chronic obstructive pulmonary disease, unspecified COPD type    budesonide-formoterol 160 mcg-4.5 mcg/inh inhalation aerosol  -- 2 puff(s) inhaled 2 times a day   -- Indication: For Chronic obstructive pulmonary disease, unspecified COPD type    clobetasol 0.05% topical ointment  -- 1 application on skin 2 times a day x 14 days  -- Indication: For Drug eruption    calamine topical lotion  -- 1 application on skin once a day  -- Indication: For Drug eruption    furosemide 40 mg oral tablet  -- 1 tab(s) by mouth once a day  -- Indication: For Essential hypertension    potassium chloride 20 mEq oral tablet, extended release  -- 1 tab(s) by mouth once a day, As Needed  -- Indication: For Prophylactic measure    omeprazole 20 mg oral delayed release tablet  -- 1 tab(s) by mouth 2 times a day  -- Indication: For Prophylactic measure

## 2017-12-05 NOTE — PROGRESS NOTE ADULT - SUBJECTIVE AND OBJECTIVE BOX
PULMONARY PROGRESS NOTE    CLAUDIA TRUJILLO  MRN-[id number]    Patient is a 76y old  Female who presents with a chief complaint of rash and weakness.       HPI: admitted for rash and weakness; possible drug reaction. Hx of COPD, on inhalers. Changes outpatient meds frequently on availability of samples.   -    ROS:   -    ACTIVE MEDICATION LIST:  MEDICATIONS  (STANDING):  allopurinol 100 milliGRAM(s) Oral daily  atorvastatin 20 milliGRAM(s) Oral at bedtime  calamine Lotion 1 Application(s) Topical daily  clobetasol 0.05% Ointment 1 Application(s) Topical two times a day  enoxaparin Injectable 40 milliGRAM(s) SubCutaneous every 24 hours  furosemide    Tablet 40 milliGRAM(s) Oral daily  gabapentin 800 milliGRAM(s) Oral three times a day  pantoprazole    Tablet 40 milliGRAM(s) Oral before breakfast  ursodiol Capsule 300 milliGRAM(s) Oral three times a day with meals    MEDICATIONS  (PRN):  acetaminophen   Tablet 650 milliGRAM(s) Oral every 6 hours PRN For Temp greater than 38 C (100.4 F)  ALBUTerol    90 MICROgram(s) HFA Inhaler 2 Puff(s) Inhalation every 6 hours PRN Shortness of Breath and/or Wheezing      EXAM:  Vital Signs Last 24 Hrs  T(C): 36.4 (05 Dec 2017 06:04), Max: 36.4 (05 Dec 2017 06:04)  T(F): 97.6 (05 Dec 2017 06:04), Max: 97.6 (05 Dec 2017 06:04)  HR: 71 (05 Dec 2017 06:04) (69 - 79)  BP: 140/55 (05 Dec 2017 06:04) (119/47 - 145/49)  BP(mean): --  RR: 17 (05 Dec 2017 06:04) (16 - 17)  SpO2: 97% (05 Dec 2017 06:04) (95% - 97%)    GENERAL: The patient is awake and alert in no apparent distress.     LUNGS: Clear to auscultation without wheezing, rales or rhonchi; respirations unlabored    HEART: Regular rate and rhythm without murmur.    Skin: Diffuse erythematous rash noed.                           10.9   6.45  )-----------( 113      ( 05 Dec 2017 05:45 )             34.2       12-05    148<H>  |  108<H>  |  27<H>  ----------------------------<  94  3.7   |  27  |  1.10    Ca    8.6      05 Dec 2017 05:45  Phos  3.0     12-05  Mg     2.2     12-05    TPro  6.4  /  Alb  2.9<L>  /  TBili  0.5  /  DBili  x   /  AST  21  /  ALT  7   /  AlkPhos  110  12-03    < from: Xray Chest 1 View AP/PA (12.03.17 @ 20:28) >    EXAM:  RAD CHEST AP PA 1 VIEW        PROCEDURE DATE:  Dec  3 2017         INTERPRETATION:  CLINICAL INFORMATION: Bilateral lower extremity edema.    EXAM: Frontal chest radiograph dated 12/3/2017.    COMPARISON: CT chest dated 10/30/2016.    FINDINGS:  Small left pleural effusion with associated atelectasis.  No pneumothorax.  The cardiomediastinal silhouette is not well evaluated on this projection.  Arthrosis of the bilateral shoulders.    IMPRESSION:   Small left pleural effusion with associated atelectasis unchanged from   the last exam.              BUBBA LANDERS M.D., RADIOLOGY RESIDENT  This document has been electronically signed.  ELMER HEAD M.D.; ATTENDING RADIOLOGIST  This document has been electronically signed. Dec  4 2017  5:49AM    < end of copied text >      PROBLEM LIST:  76y Female with HEALTH ISSUES - PROBLEM Dx:  Drug eruption: Drug eruption  Prophylactic measure: Prophylactic measure  KRISTIAN on CPAP: KRISTIAN on CPAP  Chronic obstructive pulmonary disease, unspecified COPD type: Chronic obstructive pulmonary disease, unspecified COPD type  Hyperlipidemia, unspecified hyperlipidemia type: Hyperlipidemia, unspecified hyperlipidemia type  Essential hypertension: Essential hypertension  MDS (myelodysplastic syndrome): MDS (myelodysplastic syndrome)  Bilateral leg edema: Bilateral leg edema  Pruritic rash: Pruritic rash  Urinary retention: Urinary retention  Weakness: Weakness            RECS:  Restart out patient inhalers  Avoid anticholinergics due to urinary retention      Garry Boyle MD  [phone]

## 2017-12-05 NOTE — ADVANCED PRACTICE NURSE CONSULT - ASSESSMENT
General: A&Ox4, able to transfer from bed to chair with walker and two person assist, occasional urinary incontinence due to urgency. Skin warm, dry with increased moisture in intertriginous folds, adequate skin turgor, generalized rash; managed by dermatology.    Moisture associated dermatitis- sacral fold with increased moisture, chronic hyperpigmentation and linear fissure.

## 2017-12-05 NOTE — DISCHARGE NOTE ADULT - PLAN OF CARE
continue to use creams Use clobetsol ointment twice a day to affect arrives for 14 more days. Use calamine lotion as needed for itching.  Instructed patient to follow  with Dr. Stoner in 1 week of discharge. Follow up with your PCP for further evaluation and management. Please call to make an appointment within 1-2 weeks of discharge. Losartan was held due to low blood pressure while you were in the hospital. Follow up with your PCP in 1-2 days from discharge for a BP check Follow up with your PCP for further evaluation and management. Please call to make an appointment within 1-2 weeks of discharge. continue inhalers. Follow up with your PCP for further evaluation and management. Please call to make an appointment within 1-2 weeks of discharge.

## 2017-12-05 NOTE — ADVANCED PRACTICE NURSE CONSULT - REASON FOR CONSULT
Patient seen on skin care rounds after wound care referral received for assessment of sacral skin tear and recommendations of topical management. Chart reviewed: Serum albumin 2.9g/dL, Total Serum Protein 6.4g/dL, Grey 17, patient interviewed. Patient H/O COPD no longer on home O2, KRISTIAN never on CPAP, MDS, HTN, chronic b/l LE lymphedema, osteoarthritis, HTN, presents with generalized weakness and diffuse body rash. Patient seen and followed by Dermatology for generalized rash, see consult note.

## 2017-12-05 NOTE — PHYSICAL THERAPY INITIAL EVALUATION ADULT - ACTIVE RANGE OF MOTION EXAMINATION, REHAB EVAL
except limited shoulder flexion peyton shoulder 0-30 degrees actively/bilateral upper extremity Active ROM was WFL (within functional limits)/bilateral  lower extremity Active ROM was WFL (within functional limits)

## 2017-12-05 NOTE — PHYSICAL THERAPY INITIAL EVALUATION ADULT - GENERAL OBSERVATIONS, REHAB EVAL
Patient received semi supine in bed, rashes in body and itching noticed when gets anxious, RAEGAN Heller at bedside.

## 2017-12-05 NOTE — DISCHARGE NOTE ADULT - PROVIDER TOKENS
TOKEN:'[id number]',FREE:[LAST:[hissler],FIRST:[kee],PHONE:[(   )    -],FAX:[(   )    -],ADDRESS:[DERM]],FREE:[LAST:[Kohut],PHONE:[(   )    -],FAX:[(   )    -],ADDRESS:[PCP]],TOKEN:'[id number]'

## 2017-12-05 NOTE — DISCHARGE NOTE ADULT - SECONDARY DIAGNOSIS.
Hypotension MDS (myelodysplastic syndrome) Chronic obstructive pulmonary disease, unspecified COPD type

## 2017-12-05 NOTE — ADVANCED PRACTICE NURSE CONSULT - RECOMMEDATIONS
Continue to follow up as per Dermatology for generalized rash.    Topical Recommendations:  Moisture associated dermatitis of sacral fold- Cleanse with perineal spray twice a day. Apply TRIAD moisture barrier paste twice a day and prn with episodes of incontinence.    Continue low air loss bed therapy, continue heel elevation with Z-flex fluidized positioning boots while in bed, continue to turn & reposition q2h with Z-carin fluidized positioning device, continue moisture management as recommended above & single breathable pad, continue measures to decrease friction/shear/pressure.     Continue with nutritional support as per dietary/orders.    Findings and plan discussed with patient, bedside RN and primary team. Patient educated on topical therapy, all questions and concerns addressed.    Please contact Wound Care Service Line if we can be of further assistance (ext [phone]).

## 2017-12-05 NOTE — DISCHARGE NOTE ADULT - CARE PROVIDER_API CALL
Lam Fairbanks (MD), Cardiovascular Disease  [address] [place] [address] [address]  [place] [address]  Phone: [phone]  Fax: [phone]    kee ramirez  Phone: (   )    -  Fax: (   )    -    AMY Yao  Phone: (   )    -  Fax: (   )    -    Kosta Johnston), Internal Medicine  [address] [place] [address]  Phone: [phone]  Fax: [phone]

## 2017-12-05 NOTE — DISCHARGE NOTE ADULT - PATIENT PORTAL LINK FT
“You can access the [facility] Patient Portal, offered by [facility], by registering with the following website: [email or web address]”

## 2017-12-05 NOTE — PHYSICAL THERAPY INITIAL EVALUATION ADULT - PERTINENT HX OF CURRENT PROBLEM, REHAB EVAL
This is a 76F hx COPD no longer on home O2, KRISTIAN never on CPAP, MDS, HTN, chronic b/l LE lymphedema, osteoarthritis, HTN, presents with generalized weakness and diffuse body rash from reaction to IV contrast.

## 2017-12-05 NOTE — DISCHARGE NOTE ADULT - CARE PLAN
Principal Discharge DX:	Drug eruption  Goal:	continue to use creams  Instructions for follow-up, activity and diet:	Use clobetsol ointment twice a day to affect arrives for 14 more days. Use calamine lotion as needed for itching.  Instructed patient to follow  with Dr. Stoner in 1 week of discharge. Follow up with your PCP for further evaluation and management. Please call to make an appointment within 1-2 weeks of discharge.  Secondary Diagnosis:	Hypotension  Instructions for follow-up, activity and diet:	Losartan was held due to low blood pressure while you were in the hospital. Follow up with your PCP in 1-2 days from discharge for a BP check  Secondary Diagnosis:	MDS (myelodysplastic syndrome)  Instructions for follow-up, activity and diet:	Follow up with your PCP for further evaluation and management. Please call to make an appointment within 1-2 weeks of discharge.  Secondary Diagnosis:	Chronic obstructive pulmonary disease, unspecified COPD type  Instructions for follow-up, activity and diet:	continue inhalers. Follow up with your PCP for further evaluation and management. Please call to make an appointment within 1-2 weeks of discharge.

## 2017-12-08 LAB
BACTERIA BLD CULT: SIGNIFICANT CHANGE UP
BACTERIA BLD CULT: SIGNIFICANT CHANGE UP

## 2018-01-24 ENCOUNTER — INPATIENT (INPATIENT)
Facility: HOSPITAL | Age: 78
LOS: 2 days | Discharge: HOME | End: 2018-01-27
Attending: HOSPITALIST | Admitting: HOSPITALIST
Payer: MEDICARE

## 2018-01-24 VITALS
OXYGEN SATURATION: 97 % | SYSTOLIC BLOOD PRESSURE: 127 MMHG | DIASTOLIC BLOOD PRESSURE: 48 MMHG | RESPIRATION RATE: 18 BRPM | TEMPERATURE: 100 F | HEART RATE: 90 BPM

## 2018-01-24 DIAGNOSIS — L03.119 CELLULITIS OF UNSPECIFIED PART OF LIMB: ICD-10-CM

## 2018-01-24 DIAGNOSIS — R74.8 ABNORMAL LEVELS OF OTHER SERUM ENZYMES: ICD-10-CM

## 2018-01-24 DIAGNOSIS — E78.5 HYPERLIPIDEMIA, UNSPECIFIED: ICD-10-CM

## 2018-01-24 DIAGNOSIS — Z29.9 ENCOUNTER FOR PROPHYLACTIC MEASURES, UNSPECIFIED: ICD-10-CM

## 2018-01-24 DIAGNOSIS — R60.0 LOCALIZED EDEMA: ICD-10-CM

## 2018-01-24 DIAGNOSIS — J44.9 CHRONIC OBSTRUCTIVE PULMONARY DISEASE, UNSPECIFIED: ICD-10-CM

## 2018-01-24 DIAGNOSIS — K21.9 GASTRO-ESOPHAGEAL REFLUX DISEASE WITHOUT ESOPHAGITIS: ICD-10-CM

## 2018-01-24 LAB
ALBUMIN SERPL ELPH-MCNC: 2.9 G/DL — LOW (ref 3.3–5)
ALP SERPL-CCNC: 122 U/L — HIGH (ref 40–120)
ALT FLD-CCNC: 8 U/L — SIGNIFICANT CHANGE UP (ref 4–33)
AST SERPL-CCNC: 21 U/L — SIGNIFICANT CHANGE UP (ref 4–32)
BASE EXCESS BLDV CALC-SCNC: 1.3 MMOL/L — SIGNIFICANT CHANGE UP
BASOPHILS # BLD AUTO: 0.04 K/UL — SIGNIFICANT CHANGE UP (ref 0–0.2)
BASOPHILS NFR BLD AUTO: 0.4 % — SIGNIFICANT CHANGE UP (ref 0–2)
BILIRUB SERPL-MCNC: 0.6 MG/DL — SIGNIFICANT CHANGE UP (ref 0.2–1.2)
BLOOD GAS VENOUS - CREATININE: 1.14 MG/DL — SIGNIFICANT CHANGE UP (ref 0.5–1.3)
BUN SERPL-MCNC: 18 MG/DL — SIGNIFICANT CHANGE UP (ref 7–23)
CALCIUM SERPL-MCNC: 8.6 MG/DL — SIGNIFICANT CHANGE UP (ref 8.4–10.5)
CHLORIDE BLDV-SCNC: 105 MMOL/L — SIGNIFICANT CHANGE UP (ref 96–108)
CHLORIDE SERPL-SCNC: 104 MMOL/L — SIGNIFICANT CHANGE UP (ref 98–107)
CO2 SERPL-SCNC: 22 MMOL/L — SIGNIFICANT CHANGE UP (ref 22–31)
CREAT SERPL-MCNC: 1.21 MG/DL — SIGNIFICANT CHANGE UP (ref 0.5–1.3)
EOSINOPHIL # BLD AUTO: 0.04 K/UL — SIGNIFICANT CHANGE UP (ref 0–0.5)
EOSINOPHIL NFR BLD AUTO: 0.4 % — SIGNIFICANT CHANGE UP (ref 0–6)
GAS PNL BLDV: 136 MMOL/L — SIGNIFICANT CHANGE UP (ref 136–146)
GLUCOSE BLDV-MCNC: 94 — SIGNIFICANT CHANGE UP (ref 70–99)
GLUCOSE SERPL-MCNC: 91 MG/DL — SIGNIFICANT CHANGE UP (ref 70–99)
HCO3 BLDV-SCNC: 25 MMOL/L — SIGNIFICANT CHANGE UP (ref 20–27)
HCT VFR BLD CALC: 33.3 % — LOW (ref 34.5–45)
HCT VFR BLDV CALC: 34.1 % — LOW (ref 34.5–45)
HGB BLD-MCNC: 10.7 G/DL — LOW (ref 11.5–15.5)
HGB BLDV-MCNC: 11.1 G/DL — LOW (ref 11.5–15.5)
IMM GRANULOCYTES # BLD AUTO: 0.04 # — SIGNIFICANT CHANGE UP
IMM GRANULOCYTES NFR BLD AUTO: 0.4 % — SIGNIFICANT CHANGE UP (ref 0–1.5)
LACTATE BLDV-MCNC: 1.2 MMOL/L — SIGNIFICANT CHANGE UP (ref 0.5–2)
LYMPHOCYTES # BLD AUTO: 1.45 K/UL — SIGNIFICANT CHANGE UP (ref 1–3.3)
LYMPHOCYTES # BLD AUTO: 15.1 % — SIGNIFICANT CHANGE UP (ref 13–44)
MCHC RBC-ENTMCNC: 30 PG — SIGNIFICANT CHANGE UP (ref 27–34)
MCHC RBC-ENTMCNC: 32.1 % — SIGNIFICANT CHANGE UP (ref 32–36)
MCV RBC AUTO: 93.3 FL — SIGNIFICANT CHANGE UP (ref 80–100)
MONOCYTES # BLD AUTO: 0.54 K/UL — SIGNIFICANT CHANGE UP (ref 0–0.9)
MONOCYTES NFR BLD AUTO: 5.6 % — SIGNIFICANT CHANGE UP (ref 2–14)
NEUTROPHILS # BLD AUTO: 7.48 K/UL — HIGH (ref 1.8–7.4)
NEUTROPHILS NFR BLD AUTO: 78.1 % — HIGH (ref 43–77)
NRBC # FLD: 0 — SIGNIFICANT CHANGE UP
PCO2 BLDV: 40 MMHG — LOW (ref 41–51)
PH BLDV: 7.42 PH — SIGNIFICANT CHANGE UP (ref 7.32–7.43)
PLATELET # BLD AUTO: 130 K/UL — LOW (ref 150–400)
PMV BLD: 11.8 FL — SIGNIFICANT CHANGE UP (ref 7–13)
PO2 BLDV: < 24 MMHG — LOW (ref 35–40)
POTASSIUM BLDV-SCNC: 4.1 MMOL/L — SIGNIFICANT CHANGE UP (ref 3.4–4.5)
POTASSIUM SERPL-MCNC: 4.6 MMOL/L — SIGNIFICANT CHANGE UP (ref 3.5–5.3)
POTASSIUM SERPL-SCNC: 4.6 MMOL/L — SIGNIFICANT CHANGE UP (ref 3.5–5.3)
PROT SERPL-MCNC: 6.9 G/DL — SIGNIFICANT CHANGE UP (ref 6–8.3)
RBC # BLD: 3.57 M/UL — LOW (ref 3.8–5.2)
RBC # FLD: 14.7 % — HIGH (ref 10.3–14.5)
SAO2 % BLDV: 38 % — LOW (ref 60–85)
SODIUM SERPL-SCNC: 139 MMOL/L — SIGNIFICANT CHANGE UP (ref 135–145)
WBC # BLD: 9.59 K/UL — SIGNIFICANT CHANGE UP (ref 3.8–10.5)
WBC # FLD AUTO: 9.59 K/UL — SIGNIFICANT CHANGE UP (ref 3.8–10.5)

## 2018-01-24 PROCEDURE — 99223 1ST HOSP IP/OBS HIGH 75: CPT | Mod: AI,GC

## 2018-01-24 PROCEDURE — 71045 X-RAY EXAM CHEST 1 VIEW: CPT | Mod: 26

## 2018-01-24 RX ORDER — URSODIOL 250 MG/1
300 TABLET, FILM COATED ORAL EVERY 8 HOURS
Qty: 0 | Refills: 0 | Status: DISCONTINUED | OUTPATIENT
Start: 2018-01-24 | End: 2018-01-27

## 2018-01-24 RX ORDER — FUROSEMIDE 40 MG
40 TABLET ORAL DAILY
Qty: 0 | Refills: 0 | Status: DISCONTINUED | OUTPATIENT
Start: 2018-01-24 | End: 2018-01-27

## 2018-01-24 RX ORDER — CIPROFLOXACIN LACTATE 400MG/40ML
400 VIAL (ML) INTRAVENOUS ONCE
Qty: 0 | Refills: 0 | Status: COMPLETED | OUTPATIENT
Start: 2018-01-24 | End: 2018-01-24

## 2018-01-24 RX ORDER — CEFEPIME 1 G/1
2000 INJECTION, POWDER, FOR SOLUTION INTRAMUSCULAR; INTRAVENOUS ONCE
Qty: 0 | Refills: 0 | Status: DISCONTINUED | OUTPATIENT
Start: 2018-01-24 | End: 2018-01-24

## 2018-01-24 RX ORDER — IPRATROPIUM/ALBUTEROL SULFATE 18-103MCG
3 AEROSOL WITH ADAPTER (GRAM) INHALATION ONCE
Qty: 0 | Refills: 0 | Status: COMPLETED | OUTPATIENT
Start: 2018-01-24 | End: 2018-01-24

## 2018-01-24 RX ORDER — VANCOMYCIN HCL 1 G
1000 VIAL (EA) INTRAVENOUS ONCE
Qty: 0 | Refills: 0 | Status: DISCONTINUED | OUTPATIENT
Start: 2018-01-24 | End: 2018-01-24

## 2018-01-24 RX ORDER — GABAPENTIN 400 MG/1
800 CAPSULE ORAL THREE TIMES A DAY
Qty: 0 | Refills: 0 | Status: DISCONTINUED | OUTPATIENT
Start: 2018-01-24 | End: 2018-01-27

## 2018-01-24 RX ORDER — PANTOPRAZOLE SODIUM 20 MG/1
40 TABLET, DELAYED RELEASE ORAL
Qty: 0 | Refills: 0 | Status: DISCONTINUED | OUTPATIENT
Start: 2018-01-24 | End: 2018-01-27

## 2018-01-24 RX ORDER — ALLOPURINOL 300 MG
100 TABLET ORAL DAILY
Qty: 0 | Refills: 0 | Status: DISCONTINUED | OUTPATIENT
Start: 2018-01-24 | End: 2018-01-27

## 2018-01-24 RX ORDER — ATORVASTATIN CALCIUM 80 MG/1
20 TABLET, FILM COATED ORAL AT BEDTIME
Qty: 0 | Refills: 0 | Status: DISCONTINUED | OUTPATIENT
Start: 2018-01-24 | End: 2018-01-27

## 2018-01-24 RX ORDER — ENOXAPARIN SODIUM 100 MG/ML
40 INJECTION SUBCUTANEOUS EVERY 24 HOURS
Qty: 0 | Refills: 0 | Status: DISCONTINUED | OUTPATIENT
Start: 2018-01-24 | End: 2018-01-27

## 2018-01-24 RX ORDER — TRAMADOL HYDROCHLORIDE 50 MG/1
50 TABLET ORAL
Qty: 0 | Refills: 0 | Status: DISCONTINUED | OUTPATIENT
Start: 2018-01-24 | End: 2018-01-27

## 2018-01-24 RX ORDER — CEFAZOLIN SODIUM 1 G
1000 VIAL (EA) INJECTION ONCE
Qty: 0 | Refills: 0 | Status: COMPLETED | OUTPATIENT
Start: 2018-01-24 | End: 2018-01-24

## 2018-01-24 RX ORDER — IPRATROPIUM/ALBUTEROL SULFATE 18-103MCG
3 AEROSOL WITH ADAPTER (GRAM) INHALATION EVERY 6 HOURS
Qty: 0 | Refills: 0 | Status: DISCONTINUED | OUTPATIENT
Start: 2018-01-24 | End: 2018-01-27

## 2018-01-24 RX ORDER — CIPROFLOXACIN LACTATE 400MG/40ML
400 VIAL (ML) INTRAVENOUS EVERY 12 HOURS
Qty: 0 | Refills: 0 | Status: DISCONTINUED | OUTPATIENT
Start: 2018-01-24 | End: 2018-01-24

## 2018-01-24 RX ORDER — CEFEPIME 1 G/1
INJECTION, POWDER, FOR SOLUTION INTRAMUSCULAR; INTRAVENOUS
Qty: 0 | Refills: 0 | Status: DISCONTINUED | OUTPATIENT
Start: 2018-01-24 | End: 2018-01-24

## 2018-01-24 RX ADMIN — Medication 200 MILLIGRAM(S): at 16:11

## 2018-01-24 RX ADMIN — Medication 100 MILLIGRAM(S): at 18:46

## 2018-01-24 RX ADMIN — GABAPENTIN 800 MILLIGRAM(S): 400 CAPSULE ORAL at 22:05

## 2018-01-24 RX ADMIN — Medication 3 MILLILITER(S): at 12:07

## 2018-01-24 RX ADMIN — ENOXAPARIN SODIUM 40 MILLIGRAM(S): 100 INJECTION SUBCUTANEOUS at 18:46

## 2018-01-24 RX ADMIN — URSODIOL 300 MILLIGRAM(S): 250 TABLET, FILM COATED ORAL at 22:05

## 2018-01-24 RX ADMIN — ATORVASTATIN CALCIUM 20 MILLIGRAM(S): 80 TABLET, FILM COATED ORAL at 22:05

## 2018-01-24 RX ADMIN — Medication 1 APPLICATION(S): at 22:05

## 2018-01-24 RX ADMIN — Medication 100 MILLIGRAM(S): at 12:07

## 2018-01-24 NOTE — ED PROVIDER NOTE - OBJECTIVE STATEMENT
77 y.o female pmhx of HLD, COPD, on Lasix 40 for edema, recurrent LE cellulitis, presents with worsening b/l LE edema with redness and oozing from the site. Pt states has had this multiple times in the past and is improved with elevation and wrapping with the "boots". pt states when it gets bad to where she cant walk they give her antibiotics. Pt walks with walker at baseline at home. Today she said she was unable to stand on her legs. Denies fevers, chills, chest pain , n/v/d, abdominal pain, numbness, tingling, weakness, urinary symptoms.   Follows with a Pulmonologist.

## 2018-01-24 NOTE — ED ADULT NURSE REASSESSMENT NOTE - NS ED NURSE REASSESS COMMENT FT1
pt stable and comfortable, respirations equal and non labored, no respiratory distress noted, will reassess, son at bedside

## 2018-01-24 NOTE — H&P ADULT - PROBLEM SELECTOR PLAN 2
-Patient following Pulmonologist for COPD, never been hospitalized for COPD, endorsing only productive cough for 1 week, no respiratory symptoms, no URI symptoms, sating above 95% on RA   -CXR: Right basilar strand-like opacities compatible with subsegmental atelectatic changes or scarring. Persistent left basilar/retrocardiac opacification with obscured hemidiaphragm and blunted left CP angle could be due to combination of pleural effusion and underlying airspace consolidation  -will order Chest CT for abnormal CXR findings   -Mone PRN   -2L O2 Nasal Cannula at night

## 2018-01-24 NOTE — ED ADULT NURSE NOTE - OBJECTIVE STATEMENT
pt A&Ox3 from home c/o b/l lower extremity swelling, b/l weeping clear fluid, pt states this happens every once in a while, pt  was scheduled to see her PMD but Md had an emergency so pt came to ED. denies cp, sob, n/v/d, reports cough with green like sputum, crackles heard pt A&Ox3 from home c/o b/l lower extremity swelling, b/l weeping clear fluid, edema noted, pedal pulses present, redness noted to both lower legs, pt states this happens every once in a while, pt  was scheduled to see her PMD but Md had an emergency so pt came to ED. denies cp, sob, n/v/d, reports cough with green like sputum, crackles heard

## 2018-01-24 NOTE — ED PROVIDER NOTE - MEDICAL DECISION MAKING DETAILS
77 y.o female pmhx of HLD, COPD, on Lasix 40 for edema, recurrent LE cellulitis, presents with worsening b/l LE edema with redness and oozing from the site unable to bear weight at home. Also with diffuse rhonchi - former smoker. Low grade temp rectally. Will check basics, neb, abx, xray chest, admit.

## 2018-01-24 NOTE — H&P ADULT - ATTENDING COMMENTS
Chronic LE swelling pt reports no fevers at home (max 99.8) and no increased in redness or pain, just increase swelling and weeping of legs.    -monitor off abx  -wound care consult  -needs compression    CXR abnormality w/ cough and phlegm  -eval with CT chest     PT eval    Care d/w pt and son

## 2018-01-24 NOTE — H&P ADULT - NSHPLABSRESULTS_GEN_ALL_CORE
LABS AND IMAGINE PERSONALLY REVIEWED:                            10.7   9.59  )-----------( 130      ( 24 Jan 2018 11:51 )             33.3       01-24    139  |  104  |  18  ----------------------------<  91  4.6   |  22  |  1.21    Ca    8.6      24 Jan 2018 11:51    TPro  6.9  /  Alb  2.9<L>  /  TBili  0.6  /  DBili  x   /  AST  21  /  ALT  8   /  AlkPhos  122<H>  01-24              < from: Xray Chest 1 View AP/PA (01.24.18 @ 14:01) >    IMPRESSION:  Right basilar strand-like opacities compatible with subsegmental   atelectatic changes or scarring.    Persistent left basilar/retrocardiac opacification with obscured   hemidiaphragm and blunted left CP angle could be due to combination of   pleural effusion and underlying airspace consolidation including   possibility of infiltrate/pneumonia in the proper clinical context and/or   other parenchymal pathology including neoplasm, chest CT would be helpful   to further assess as warranted.    Clear remaining visualized mid and upper lungs. No pneumothorax.    Stable cardiac and mediastinal silhouettes including scant aortic arch   calcifications.    Trachea midline.    Generalized osteopenia, spinal degenerative changes with scoliotic   curvature, and advanced bilateral shoulder rotator cuff arthropathy again   noted.

## 2018-01-24 NOTE — H&P ADULT - NSHPSOCIALHISTORY_GEN_ALL_CORE
Patient lives at home alone. Tenant that lives in her home takes care of her ADL's, son does shopping. Patient former smoker, smoked for 30 years 1.5 PPD's per day, quit 30 years ago. Patient denies alcohol or recreational drug use.

## 2018-01-24 NOTE — H&P ADULT - PROBLEM SELECTOR PLAN 1
-Patient with history of LE swelling, chronic venous stasis, presenting with worsening of swelling accompanied by erythema and fluid discharge  -LE findings consistent with Lipodermatosclerosis secondary to chronic venous insufficiency  -Does not look infected at this time, patient endorses that there was no increased erythema or pain, only increased swelling, will monitor off of antibiotics   -will c/w Lasix 20 mg PO daily, allopurinol (unclear if patient with history of gout, on this daily at home), and gabapentin   -Will consult with wound care about management, including Unna boot   -f/u Blood culture  -Clotrimazole cream for toes

## 2018-01-24 NOTE — H&P ADULT - FAMILY HISTORY
Father  Still living? Unknown  Paternal family history of emphysema, Age at diagnosis: Age Unknown     Mother  Still living? Unknown  Family history of diabetes mellitus, Age at diagnosis: Age Unknown

## 2018-01-24 NOTE — H&P ADULT - ASSESSMENT
77 year old female with PMHx significant for HLD, COPD, GERD, chronic LE edema (on Lasix 40 mg) and recurrent LE cellulitis, presents with worsening b/l LE edema accompanying by erythema and discharge from b/l LE's, exam findings consistent with Lipodermatosclerosis secondary to chronic venous insufficiency,

## 2018-01-24 NOTE — ED PROVIDER NOTE - CARE PLAN
Principal Discharge DX:	Cellulitis, leg  Secondary Diagnosis:	Pneumonia  Secondary Diagnosis:	Bilateral leg edema

## 2018-01-24 NOTE — ED PROVIDER NOTE - ATTENDING CONTRIBUTION TO CARE
ED Attending (Dimitris MAXWELL): I have personally performed a face to face bedside history and physical examination of this patient. I have discussed the history, examination, assessment and plan of management with the Physician Assistant. My findings include: 77F hx COPD no longer on home O2, KRISTIAN never on CPAP, MDS, HTN, chronic b/l LE lymphedema with episodes of cellulitis, osteoarthritis, HTN, c/o worsening edema of both legs with redness oozing and mild pain c/w another episode of cellulitis. She feels this episode is bad enough to require admission and IV antibiotics based on prior experience and her current symptoms and inability to walk. + low grade fever. On exam: T99.6 orally, VSS, lungs bilat rhonchi, heart sounds normal abdo soft non tender, Ext: bilat lower leg edema with oozing of clear fluid, redness, mild tenderness, no crepitus, good pulses sensation and motor function, IMP Worse LE Edema, ? related to cor pulmonale with COPD, superinfection cellulitis. Plan: Labs cultures, IV antibiotics, CXR nebs, O2, elevation, wrap legs, medical admission

## 2018-01-24 NOTE — H&P ADULT - HISTORY OF PRESENT ILLNESS
77 year old female with PMHx significant for HLD, COPD, GERD, chronic LE edema (on Lasix 40 mg) and recurrent LE cellulitis, presents with worsening b/l LE edema accompanying by erythema and discharge from b/l LE's. Patient denies worsening of pain or erythema related to her swelling. Patient does endorse clear fluid discharge from breaks in the skin. Patient has been having worsening symptoms for the past couple of days. Patient uses walker at baseline, and has lifting chair at home, and today the swelling was so significant that she could not stand on her feet.  Patient has had this happen multiple times in the past, and it improved with elevation and wrapping with Unna boots, her PCP manages her LE swelling regularly. Patient has gotten antibiotics in the past, most of the time ciprofloxacin. Patient took 1 pill of leftover ciprofloxacin this morning. Patient endorses productive cough with green sputum for 1 week. Patient sees pulmonologist, Dr. Boyle for COPD, takes Spiriva and another medication that she can not recall. Patient denies fevers, chills, shortness of breath, chest pain , n/v/d, abdominal pain, numbness, tingling, weakness, urinary symptoms.      In ED, Temp 99.6, HR 90, 127/48, sating 97% RA. Labs notable for Hgb 10.7, platelet 130, albumin 2.9, alk phos 122. WC normal. Patient given 1 dose each of ciprofloxacin and cefazolin. 77 year old female with PMHx significant for HLD, COPD, GERD, chronic LE edema (on Lasix 40 mg) and recurrent LE cellulitis, presents with worsening b/l LE edema accompanying by erythema and discharge from b/l LE's. Patient denies worsening of pain or erythema related to her swelling. Patient does endorse clear fluid discharge from breaks in the skin. Patient has been having worsening symptoms for the past couple of days. Patient uses walker at baseline, and has lifting chair at home, and today the swelling was so significant that she could not stand on her feet.  Patient has had this happen multiple times in the past, and it improved with elevation and wrapping with Unna boots, her PCP manages her LE swelling regularly. Patient has gotten antibiotics in the past, most of the time ciprofloxacin. Patient took 1 pill of leftover ciprofloxacin this morning. Patient endorses productive cough with green sputum for 1 week. Patient sees pulmonologist, Dr. Boyle for COPD, takes Spiriva and another medication that she can not recall. Patient denies fevers, chills, shortness of breath, chest pain, n/v/d, abdominal pain, numbness, tingling, weakness, urinary symptoms.      In ED, Temp 99.6, HR 90, 127/48, sating 97% RA. Labs notable for Hgb 10.7, platelet 130, albumin 2.9, alk phos 122. WC normal. Patient given 1 dose each of ciprofloxacin and cefazolin.

## 2018-01-24 NOTE — H&P ADULT - PROBLEM SELECTOR PLAN 6
Lovenox 40 mg daily     Brionna Tay M.D.   PGY-1 | Internal Medicine   [phone] | [id number]  After 7 pm covering pager [phone]

## 2018-01-24 NOTE — H&P ADULT - NSHPPHYSICALEXAM_GEN_ALL_CORE
PHYSICAL EXAM  GENERAL: NAD, laying comfortably in bed   HEAD:  Atraumatic, Normocephalic  EYES: EOMI b/l, PERRLA b/l, conjunctiva and sclera clear  NECK: Supple, No JVD, No LAD   CHEST/LUNG: Clear to auscultation bilaterally; No wheeze or ronchi  HEART: Regular rate and rhythm; S1 and S2 present, No murmurs, rubs, or gallops  ABDOMEN: Soft, Nontender, Nondistended; Bowel sounds present  EXTREMITIES:  2+ Peripheral Pulses, 2+ edema in b/l LE's, erythematous, breaks in skin, no discharge noted, consistent with chronic venous stasis changes, tender to palpation   NEURO: AAOx3, non-focal, 4/5 strength LE b/l, diminished because of pain T(C): 37.4 (01-24-18 @ 17:00), Max: 37.7 (01-24-18 @ 11:09)  HR: 97 (01-24-18 @ 17:00) (89 - 97)  BP: 122/52 (01-24-18 @ 17:00) (122/52 - 127/53)  RR: 16 (01-24-18 @ 17:00) (16 - 18)  SpO2: 100% (01-24-18 @ 17:00) (97% - 100%)    PHYSICAL EXAM  GENERAL: NAD, laying comfortably in bed   HEAD:  Atraumatic, Normocephalic  EYES: EOMI b/l, PERRLA b/l, conjunctiva and sclera clear  NECK: Supple, No JVD, No LAD   CHEST/LUNG: Clear to auscultation bilaterally; No wheeze or ronchi  HEART: Regular rate and rhythm; S1 and S2 present, No murmurs, rubs, or gallops  ABDOMEN: Soft, Nontender, Nondistended; Bowel sounds present  EXTREMITIES:  2+ Peripheral Pulses, 2+ edema in b/l LE's, erythematous, breaks in skin, no discharge noted, consistent with chronic venous stasis changes, tender to palpation   NEURO: AAOx3, non-focal, 4/5 strength LE b/l, diminished because of pain

## 2018-01-24 NOTE — ED PROVIDER NOTE - PHYSICAL EXAMINATION
moving all digits, cap refill normal , compartments soft  erythema extending from lower digits to inferior shin  areas of oozing blisters

## 2018-01-24 NOTE — H&P ADULT - NSHPREVIEWOFSYSTEMS_GEN_ALL_CORE
Review of Systems:  Constitutional: No fever, No weight loss, good appetite/po intake  Eyes: No blurry vision, No diplopia  Neuro: No tremors, No muscle weakness   Cardiovascular: No chest pain, No palpitations  Respiratory: No SOB, + productive cough  GI: No nausea, No vomiting, No diarrhea  : No dysuria, No hematuria  Skin: No rash  Extremities: + b/l LE swelling

## 2018-01-24 NOTE — H&P ADULT - PROBLEM SELECTOR PLAN 5
Lovenox 40 mg daily     Brionna Tay M.D.   PGY-1 | Internal Medicine   [phone] | [phone]  After 7 pm covering pager [phone] -c/w PPI

## 2018-01-24 NOTE — ED PROVIDER NOTE - PROGRESS NOTE DETAILS
Also noted to have RLL infiltrate on CXR. We will change antibiotic to Levaquin and admit to medicine Also noted to have RLL infiltrate on CXR. We will change antibiotic to HCAP coverage (admitted in Dec 2017) and admit to medicine

## 2018-01-24 NOTE — PATIENT PROFILE ADULT. - LOCATION
under right buttock, purplish discoloration under right buttock, purplish discoloration (assessed by primary RN)

## 2018-01-24 NOTE — ED ADULT TRIAGE NOTE - CHIEF COMPLAINT QUOTE
Pt c/o recurrent bilateral leg cellulitis for the past 3 days, +drainage, reports low-grade fever last night. Pt also c/o weakness in her legs, states she was seen here last month for same symptoms.

## 2018-01-25 LAB

## 2018-01-25 PROCEDURE — 71250 CT THORAX DX C-: CPT | Mod: 26

## 2018-01-25 PROCEDURE — 99233 SBSQ HOSP IP/OBS HIGH 50: CPT | Mod: GC

## 2018-01-25 RX ADMIN — URSODIOL 300 MILLIGRAM(S): 250 TABLET, FILM COATED ORAL at 06:17

## 2018-01-25 RX ADMIN — Medication 100 MILLIGRAM(S): at 13:21

## 2018-01-25 RX ADMIN — Medication 40 MILLIGRAM(S): at 06:17

## 2018-01-25 RX ADMIN — URSODIOL 300 MILLIGRAM(S): 250 TABLET, FILM COATED ORAL at 13:21

## 2018-01-25 RX ADMIN — ATORVASTATIN CALCIUM 20 MILLIGRAM(S): 80 TABLET, FILM COATED ORAL at 22:44

## 2018-01-25 RX ADMIN — URSODIOL 300 MILLIGRAM(S): 250 TABLET, FILM COATED ORAL at 22:44

## 2018-01-25 RX ADMIN — GABAPENTIN 800 MILLIGRAM(S): 400 CAPSULE ORAL at 13:21

## 2018-01-25 RX ADMIN — ENOXAPARIN SODIUM 40 MILLIGRAM(S): 100 INJECTION SUBCUTANEOUS at 17:37

## 2018-01-25 RX ADMIN — Medication 1 APPLICATION(S): at 17:37

## 2018-01-25 RX ADMIN — GABAPENTIN 800 MILLIGRAM(S): 400 CAPSULE ORAL at 06:17

## 2018-01-25 RX ADMIN — GABAPENTIN 800 MILLIGRAM(S): 400 CAPSULE ORAL at 22:44

## 2018-01-25 RX ADMIN — Medication 1 APPLICATION(S): at 08:17

## 2018-01-25 NOTE — PHYSICAL THERAPY INITIAL EVALUATION ADULT - GENERAL OBSERVATIONS, REHAB EVAL
Consult received, chart reviewed. Patient received supine in bed, NAD, +IV. Patient agreed to EVALUATION from Physical Therapist.

## 2018-01-25 NOTE — PROGRESS NOTE ADULT - PROBLEM SELECTOR PLAN 2
-Patient following Pulmonologist for COPD, never been hospitalized for COPD, endorsing only productive cough for 1 week, no respiratory symptoms, no URI symptoms, sating above 95% on RA   -CXR: Right basilar strand-like opacities compatible with subsegmental atelectatic changes or scarring. Persistent left basilar/retrocardiac opacification with obscured hemidiaphragm and blunted left CP angle could be due to combination of pleural effusion and underlying airspace consolidation  -f/u Chest CT for abnormal CXR findings   -Mone PRN   -2L O2 Nasal Cannula at night

## 2018-01-25 NOTE — PHYSICAL THERAPY INITIAL EVALUATION ADULT - CRITERIA FOR SKILLED THERAPEUTIC INTERVENTIONS
rehab potential/predicted duration of therapy intervention/impairments found/therapy frequency/anticipated equipment needs at discharge/anticipated discharge recommendation

## 2018-01-25 NOTE — PHYSICAL THERAPY INITIAL EVALUATION ADULT - RANGE OF MOTION EXAMINATION, REHAB EVAL
except bilateral LE ~1/2 range/bilateral upper extremity ROM was WFL (within functional limits)/bilateral lower extremity ROM was WFL (within functional limits)

## 2018-01-25 NOTE — PROGRESS NOTE ADULT - PROBLEM SELECTOR PLAN 6
Lovenox 40 mg daily     Brionna Tay M.D.   PGY-1 | Internal Medicine   [phone] | [phone]  After 7 pm covering pager [phone]

## 2018-01-25 NOTE — PROGRESS NOTE ADULT - SUBJECTIVE AND OBJECTIVE BOX
Patient is a 77y old  Female who presents with a chief complaint of LE swelling (24 Jan 2018 18:08)        SUBJECTIVE / OVERNIGHT EVENTS: Patient had no acute events overnight. Patient seen and examined at bedside this morning. Patient still endorsing LE swelling and pain. Patient denies shortness of breath or chest pain, endorse productive cough.     ROS: [ - ] Fever [ - ] Chills [ - ] Nausea/Vomiting     MEDICATIONS  (STANDING):  allopurinol 100 milliGRAM(s) Oral daily  atorvastatin 20 milliGRAM(s) Oral at bedtime  clotrimazole 1% Cream 1 Application(s) Topical two times a day  enoxaparin Injectable 40 milliGRAM(s) SubCutaneous every 24 hours  furosemide    Tablet 40 milliGRAM(s) Oral daily  gabapentin 800 milliGRAM(s) Oral three times a day  pantoprazole    Tablet 40 milliGRAM(s) Oral before breakfast  ursodiol Capsule 300 milliGRAM(s) Oral every 8 hours    MEDICATIONS  (PRN):  ALBUTerol/ipratropium for Nebulization 3 milliLiter(s) Nebulizer every 6 hours PRN Shortness of Breath and/or Wheezing  traMADol 50 milliGRAM(s) Oral two times a day PRN moderate or severe pain      Vital Signs Last 24 Hrs  T(C): 36.9 (25 Jan 2018 05:23), Max: 37.7 (24 Jan 2018 11:09)  T(F): 98.5 (25 Jan 2018 05:23), Max: 99.8 (24 Jan 2018 11:09)  HR: 85 (25 Jan 2018 05:23) (85 - 97)  BP: 128/69 (25 Jan 2018 05:23) (117/74 - 128/69)  BP(mean): --  RR: 18 (25 Jan 2018 05:23) (16 - 18)  SpO2: 97% (25 Jan 2018 05:23) (97% - 100%)  CAPILLARY BLOOD GLUCOSE        I&O's Summary      PHYSICAL EXAM  GENERAL: NAD, laying comfortably in bed   HEAD:  Atraumatic, Normocephalic  EYES: EOMI b/l, PERRLA b/l, conjunctiva and sclera clear  NECK: Supple, No JVD, No LAD   CHEST/LUNG: Ronchi upper lobe b/l  HEART: Regular rate and rhythm; S1 and S2 present, No murmurs, rubs, or gallops  ABDOMEN: Soft, Nontender, Nondistended; Bowel sounds present  EXTREMITIES:  2+ Peripheral Pulses, 2+ edema in b/l LE's, erythematous, breaks in skin, no discharge noted, consistent with chronic venous stasis changes, tender to palpation   NEURO: AAOx3, non-focal, 4/5 strength LE b/l, diminished because of pain    LABS:                        10.7   9.59  )-----------( 130      ( 24 Jan 2018 11:51 )             33.3     01-24    139  |  104  |  18  ----------------------------<  91  4.6   |  22  |  1.21    Ca    8.6      24 Jan 2018 11:51    TPro  6.9  /  Alb  2.9<L>  /  TBili  0.6  /  DBili  x   /  AST  21  /  ALT  8   /  AlkPhos  122<H>  01-24 Patient is a 77y old  Female who presents with a chief complaint of LE swelling (24 Jan 2018 18:08)    SUBJECTIVE / OVERNIGHT EVENTS: Patient had no acute events overnight. Patient seen and examined at bedside this morning. Patient still endorsing LE swelling and pain. Patient denies shortness of breath or chest pain, endorse productive cough.     ROS: [ - ] Fever [ - ] Chills [ - ] Nausea/Vomiting     MEDICATIONS  (STANDING):  allopurinol 100 milliGRAM(s) Oral daily  atorvastatin 20 milliGRAM(s) Oral at bedtime  clotrimazole 1% Cream 1 Application(s) Topical two times a day  enoxaparin Injectable 40 milliGRAM(s) SubCutaneous every 24 hours  furosemide    Tablet 40 milliGRAM(s) Oral daily  gabapentin 800 milliGRAM(s) Oral three times a day  pantoprazole    Tablet 40 milliGRAM(s) Oral before breakfast  ursodiol Capsule 300 milliGRAM(s) Oral every 8 hours    MEDICATIONS  (PRN):  ALBUTerol/ipratropium for Nebulization 3 milliLiter(s) Nebulizer every 6 hours PRN Shortness of Breath and/or Wheezing  traMADol 50 milliGRAM(s) Oral two times a day PRN moderate or severe pain    Vital Signs Last 24 Hrs  T(C): 36.9 (25 Jan 2018 05:23), Max: 37.7 (24 Jan 2018 11:09)  T(F): 98.5 (25 Jan 2018 05:23), Max: 99.8 (24 Jan 2018 11:09)  HR: 85 (25 Jan 2018 05:23) (85 - 97)  BP: 128/69 (25 Jan 2018 05:23) (117/74 - 128/69)  BP(mean): --  RR: 18 (25 Jan 2018 05:23) (16 - 18)  SpO2: 97% (25 Jan 2018 05:23) (97% - 100%)  CAPILLARY BLOOD GLUCOSE    PHYSICAL EXAM  GENERAL: NAD, laying comfortably in bed   HEAD:  Atraumatic, Normocephalic  EYES: EOMI b/l, PERRLA b/l, conjunctiva and sclera clear  NECK: Supple, No JVD, No LAD   CHEST/LUNG: Ronchi upper lobe b/l  HEART: Regular rate and rhythm; S1 and S2 present, No murmurs, rubs, or gallops  ABDOMEN: Soft, Nontender, Nondistended; Bowel sounds present  EXTREMITIES:  2+ Peripheral Pulses, 2+ edema in b/l LE's, erythematous, breaks in skin, no discharge noted, consistent with chronic venous stasis changes, tender to palpation   NEURO: AAOx3, non-focal, 4/5 strength LE b/l, diminished because of pain    LABS:                        10.7   9.59  )-----------( 130      ( 24 Jan 2018 11:51 )             33.3     01-24    139  |  104  |  18  ----------------------------<  91  4.6   |  22  |  1.21    Ca    8.6      24 Jan 2018 11:51    TPro  6.9  /  Alb  2.9<L>  /  TBili  0.6  /  DBili  x   /  AST  21  /  ALT  8   /  AlkPhos  122<H>  01-24

## 2018-01-25 NOTE — PHYSICAL THERAPY INITIAL EVALUATION ADULT - PLANNED THERAPY INTERVENTIONS, PT EVAL
balance training/gait training/strengthening/postural re-education/transfer training/bed mobility training

## 2018-01-25 NOTE — PHYSICAL THERAPY INITIAL EVALUATION ADULT - LEVEL OF INDEPENDENCE: SIT/STAND, REHAB EVAL
Pt deferring at this time secondary to complaint of bilateral LE weakness and pt. reports that she wants wound care to assess her LE before getting out of bed. PT will continue to follow.

## 2018-01-25 NOTE — PHYSICAL THERAPY INITIAL EVALUATION ADULT - ADDITIONAL COMMENTS
Pt reports household ambulation with rolling walker. Pt. has tenant upstairs who assists with ADLs. Pt has a recliner lift chair, commode, shower chair, and grab bars.     Pt left supine as received, NAD, all lines/devices intact, call bell in reach.

## 2018-01-26 ENCOUNTER — TRANSCRIPTION ENCOUNTER (OUTPATIENT)
Age: 78
End: 2018-01-26

## 2018-01-26 LAB
ALBUMIN SERPL ELPH-MCNC: 2.5 G/DL — LOW (ref 3.3–5)
ALP SERPL-CCNC: 103 U/L — SIGNIFICANT CHANGE UP (ref 40–120)
ALT FLD-CCNC: 5 U/L — SIGNIFICANT CHANGE UP (ref 4–33)
AST SERPL-CCNC: 12 U/L — SIGNIFICANT CHANGE UP (ref 4–32)
BASOPHILS # BLD AUTO: 0.02 K/UL — SIGNIFICANT CHANGE UP (ref 0–0.2)
BASOPHILS NFR BLD AUTO: 0.4 % — SIGNIFICANT CHANGE UP (ref 0–2)
BILIRUB SERPL-MCNC: 0.3 MG/DL — SIGNIFICANT CHANGE UP (ref 0.2–1.2)
BUN SERPL-MCNC: 14 MG/DL — SIGNIFICANT CHANGE UP (ref 7–23)
CALCIUM SERPL-MCNC: 8 MG/DL — LOW (ref 8.4–10.5)
CHLORIDE SERPL-SCNC: 107 MMOL/L — SIGNIFICANT CHANGE UP (ref 98–107)
CO2 SERPL-SCNC: 23 MMOL/L — SIGNIFICANT CHANGE UP (ref 22–31)
CREAT SERPL-MCNC: 0.88 MG/DL — SIGNIFICANT CHANGE UP (ref 0.5–1.3)
EOSINOPHIL # BLD AUTO: 0.18 K/UL — SIGNIFICANT CHANGE UP (ref 0–0.5)
EOSINOPHIL NFR BLD AUTO: 3.2 % — SIGNIFICANT CHANGE UP (ref 0–6)
GLUCOSE SERPL-MCNC: 82 MG/DL — SIGNIFICANT CHANGE UP (ref 70–99)
HCT VFR BLD CALC: 32.4 % — LOW (ref 34.5–45)
HGB BLD-MCNC: 10.3 G/DL — LOW (ref 11.5–15.5)
IMM GRANULOCYTES # BLD AUTO: 0.02 # — SIGNIFICANT CHANGE UP
IMM GRANULOCYTES NFR BLD AUTO: 0.4 % — SIGNIFICANT CHANGE UP (ref 0–1.5)
LYMPHOCYTES # BLD AUTO: 1.2 K/UL — SIGNIFICANT CHANGE UP (ref 1–3.3)
LYMPHOCYTES # BLD AUTO: 21.5 % — SIGNIFICANT CHANGE UP (ref 13–44)
MAGNESIUM SERPL-MCNC: 2 MG/DL — SIGNIFICANT CHANGE UP (ref 1.6–2.6)
MCHC RBC-ENTMCNC: 28.7 PG — SIGNIFICANT CHANGE UP (ref 27–34)
MCHC RBC-ENTMCNC: 31.8 % — LOW (ref 32–36)
MCV RBC AUTO: 90.3 FL — SIGNIFICANT CHANGE UP (ref 80–100)
MONOCYTES # BLD AUTO: 0.34 K/UL — SIGNIFICANT CHANGE UP (ref 0–0.9)
MONOCYTES NFR BLD AUTO: 6.1 % — SIGNIFICANT CHANGE UP (ref 2–14)
NEUTROPHILS # BLD AUTO: 3.81 K/UL — SIGNIFICANT CHANGE UP (ref 1.8–7.4)
NEUTROPHILS NFR BLD AUTO: 68.4 % — SIGNIFICANT CHANGE UP (ref 43–77)
NRBC # FLD: 0 — SIGNIFICANT CHANGE UP
PHOSPHATE SERPL-MCNC: 2.2 MG/DL — LOW (ref 2.5–4.5)
PLATELET # BLD AUTO: 144 K/UL — LOW (ref 150–400)
PMV BLD: 12.1 FL — SIGNIFICANT CHANGE UP (ref 7–13)
POTASSIUM SERPL-MCNC: 3.8 MMOL/L — SIGNIFICANT CHANGE UP (ref 3.5–5.3)
POTASSIUM SERPL-SCNC: 3.8 MMOL/L — SIGNIFICANT CHANGE UP (ref 3.5–5.3)
PROT SERPL-MCNC: 6.2 G/DL — SIGNIFICANT CHANGE UP (ref 6–8.3)
RBC # BLD: 3.59 M/UL — LOW (ref 3.8–5.2)
RBC # FLD: 14.5 % — SIGNIFICANT CHANGE UP (ref 10.3–14.5)
SODIUM SERPL-SCNC: 141 MMOL/L — SIGNIFICANT CHANGE UP (ref 135–145)
WBC # BLD: 5.57 K/UL — SIGNIFICANT CHANGE UP (ref 3.8–10.5)
WBC # FLD AUTO: 5.57 K/UL — SIGNIFICANT CHANGE UP (ref 3.8–10.5)

## 2018-01-26 PROCEDURE — 99233 SBSQ HOSP IP/OBS HIGH 50: CPT | Mod: GC

## 2018-01-26 RX ORDER — SENNA PLUS 8.6 MG/1
2 TABLET ORAL AT BEDTIME
Qty: 0 | Refills: 0 | Status: DISCONTINUED | OUTPATIENT
Start: 2018-01-26 | End: 2018-01-27

## 2018-01-26 RX ORDER — DOCUSATE SODIUM 100 MG
100 CAPSULE ORAL
Qty: 0 | Refills: 0 | Status: DISCONTINUED | OUTPATIENT
Start: 2018-01-26 | End: 2018-01-27

## 2018-01-26 RX ORDER — TIOTROPIUM BROMIDE 18 UG/1
1 CAPSULE ORAL; RESPIRATORY (INHALATION) AT BEDTIME
Qty: 0 | Refills: 0 | Status: DISCONTINUED | OUTPATIENT
Start: 2018-01-26 | End: 2018-01-27

## 2018-01-26 RX ORDER — BUDESONIDE AND FORMOTEROL FUMARATE DIHYDRATE 160; 4.5 UG/1; UG/1
2 AEROSOL RESPIRATORY (INHALATION)
Qty: 0 | Refills: 0 | Status: DISCONTINUED | OUTPATIENT
Start: 2018-01-26 | End: 2018-01-27

## 2018-01-26 RX ADMIN — Medication 62.5 MILLIMOLE(S): at 10:46

## 2018-01-26 RX ADMIN — ENOXAPARIN SODIUM 40 MILLIGRAM(S): 100 INJECTION SUBCUTANEOUS at 18:53

## 2018-01-26 RX ADMIN — ATORVASTATIN CALCIUM 20 MILLIGRAM(S): 80 TABLET, FILM COATED ORAL at 23:05

## 2018-01-26 RX ADMIN — Medication 1 APPLICATION(S): at 06:25

## 2018-01-26 RX ADMIN — Medication 100 MILLIGRAM(S): at 14:00

## 2018-01-26 RX ADMIN — GABAPENTIN 800 MILLIGRAM(S): 400 CAPSULE ORAL at 06:25

## 2018-01-26 RX ADMIN — URSODIOL 300 MILLIGRAM(S): 250 TABLET, FILM COATED ORAL at 06:25

## 2018-01-26 RX ADMIN — TIOTROPIUM BROMIDE 1 CAPSULE(S): 18 CAPSULE ORAL; RESPIRATORY (INHALATION) at 22:58

## 2018-01-26 RX ADMIN — Medication 1 TABLET(S): at 23:06

## 2018-01-26 RX ADMIN — Medication 40 MILLIGRAM(S): at 06:25

## 2018-01-26 RX ADMIN — URSODIOL 300 MILLIGRAM(S): 250 TABLET, FILM COATED ORAL at 23:06

## 2018-01-26 RX ADMIN — BUDESONIDE AND FORMOTEROL FUMARATE DIHYDRATE 2 PUFF(S): 160; 4.5 AEROSOL RESPIRATORY (INHALATION) at 22:57

## 2018-01-26 RX ADMIN — GABAPENTIN 800 MILLIGRAM(S): 400 CAPSULE ORAL at 13:57

## 2018-01-26 RX ADMIN — SENNA PLUS 2 TABLET(S): 8.6 TABLET ORAL at 23:05

## 2018-01-26 RX ADMIN — Medication 1 TABLET(S): at 13:56

## 2018-01-26 RX ADMIN — Medication 100 MILLIGRAM(S): at 18:56

## 2018-01-26 RX ADMIN — Medication 100 MILLIGRAM(S): at 06:25

## 2018-01-26 RX ADMIN — GABAPENTIN 800 MILLIGRAM(S): 400 CAPSULE ORAL at 23:06

## 2018-01-26 RX ADMIN — Medication 1 APPLICATION(S): at 18:54

## 2018-01-26 RX ADMIN — URSODIOL 300 MILLIGRAM(S): 250 TABLET, FILM COATED ORAL at 13:57

## 2018-01-26 RX ADMIN — PANTOPRAZOLE SODIUM 40 MILLIGRAM(S): 20 TABLET, DELAYED RELEASE ORAL at 06:25

## 2018-01-26 NOTE — DISCHARGE NOTE ADULT - PLAN OF CARE
You came into the hospital because of bilateral leg swelling. You did not exhibit signs of infection. You were seen by the wound care team while you were here, and you should follow up with the wound care instruction listed below. You should follow up with the [facility] ([phone], [address] [place] [address]) or primary care MD for wound care/wrapping of lower legs. finish Augmentin 7 day course, f/u with Dr. Boyle about repeat CT imaging You were seen by your pulmonologist while you were in the hospital. He recommended symbicort BID and spiriva QD. You had a CT chest with stable nodules upper lobes but increased density in bilateral lower lobes, which can either be pneumonia or malignancy. You were started on a 7 day course of Augmentin that you will finish at home. You should follow up with Dr. Boyle in the office for a repeat CT chest in 4 weeks to reevaluate. follow wound care instructions and follow up in wound care clinic

## 2018-01-26 NOTE — DISCHARGE NOTE ADULT - PROVIDER TOKENS
FREE:[LAST:[Dr. Boyle],PHONE:[(   )    -],FAX:[(   )    -],ADDRESS:[[address] [place] [address], [place] [address]    [phone]]]

## 2018-01-26 NOTE — DISCHARGE NOTE ADULT - INSTRUCTIONS
Recommend follow up care at [facility] ([phone], [address] [place] [address]) or primary care MD for wound care/wrapping of lower legs.    B/L LE: Cleanse with soap and water, pat dry. Apply Liquid barrier film to periwound skin of open ulcers. Cover with foam without border. Apply Sween 24 moisturizing lotion to dry/scaly skin of lower legs. Wrap with kerlix, and ACE bandage. Change daily while in hospital. Change very 72 hours at home.    B/L buttock and posterior thighs: Cleanse with skin cleanser, pat dry. Apply Antifungal barrier cream twice a day.    Continue low air loss bed therapy, continue to turn & reposition q2h, continue moisture management with antifungal barrier creams & single breathable pad, continue measures to decrease friction/shear/pressure.

## 2018-01-26 NOTE — DISCHARGE NOTE ADULT - ADDITIONAL INSTRUCTIONS
-Follow up with Dr. Boyle in the office for a repeat CT chest in 4 weeks to reevaluate  -[facility] ([phone], [address] [place] [address]) or primary care MD for wound care/wrapping of lower legs.

## 2018-01-26 NOTE — DISCHARGE NOTE ADULT - MEDICATION SUMMARY - MEDICATIONS TO TAKE
I will START or STAY ON the medications listed below when I get home from the hospital:    traMADol 50 mg oral tablet  -- 1 tab(s) by mouth 2 times a day, As Needed  -- Indication: For Pain    gabapentin 800 mg oral tablet  -- 1 tab(s) by mouth 3 times a day  -- Indication: For Pain    allopurinol 100 mg oral tablet  -- 1 tab(s) by mouth once a day  -- Indication: For Gout    pravastatin 80 mg oral tablet  -- 1 tab(s) by mouth once a day (at bedtime)  -- Indication: For Hyperlipidemia    ProAir HFA 90 mcg/inh inhalation aerosol  -- 2 puff(s) inhaled 4 times a day, As Needed  -- Indication: For COPD (chronic obstructive pulmonary disease)    budesonide-formoterol 80 mcg-4.5 mcg/inh inhalation aerosol  -- 2 puff(s) inhaled 2 times a day   -- Indication: For COPD (chronic obstructive pulmonary disease)    tiotropium 18 mcg inhalation capsule  -- 1 cap(s) inhaled once a day (at bedtime)  -- Indication: For COPD (chronic obstructive pulmonary disease)    clotrimazole 1% topical cream  -- 1 application on skin 2 times a day  -- Indication: For Stasis dermatitis    furosemide 40 mg oral tablet  -- 1 tab(s) by mouth once a day  -- Indication: For Bilateral leg edema    ursodiol 300 mg oral capsule  -- 1 cap(s) by mouth 3 times a day  -- Indication: For Elevated liver enzymes    potassium chloride 20 mEq oral tablet, extended release  -- 1 tab(s) by mouth once a day, As Needed  -- Indication: For Hypokalemia    amoxicillin-clavulanate 500 mg-125 mg oral tablet  -- 1 tab(s) by mouth every 8 hours   -- Indication: For PNA    omeprazole 20 mg oral delayed release tablet  -- 1 tab(s) by mouth 2 times a day  -- Indication: For GERD (gastroesophageal reflux disease)

## 2018-01-26 NOTE — PROGRESS NOTE ADULT - PROBLEM SELECTOR PLAN 2
-Patient following Pulmonologist for COPD, never been hospitalized for COPD, endorsing only productive cough for 1 week, no respiratory symptoms, no URI symptoms, sating above 95% on RA   -CXR: Right basilar strand-like opacities compatible with subsegmental atelectatic changes or scarring. Persistent left basilar/retrocardiac opacification with obscured hemidiaphragm and blunted left CP angle could be due to combination of pleural effusion and underlying airspace consolidation  - CT chest: Interval increase in bilateral lower lobe consolidation, left greater than right, may represent worsening pneumonia, with concern for underlying neoplasm, Dr. Boyle  -patient's pulmonologist, Dr Boyle, was consulted, said he will see patient    -RVP negative   -Duonebs PRN   -2L NC at night -Patient following Pulmonologist for COPD, never been hospitalized for COPD, endorsing only productive cough for 1 week, no respiratory symptoms, no URI symptoms, sating above 95% on RA   -CXR: Right basilar strand-like opacities compatible with subsegmental atelectatic changes or scarring. Persistent left basilar/retrocardiac opacification with obscured hemidiaphragm and blunted left CP angle could be due to combination of pleural effusion and underlying airspace consolidation  - CT chest: Interval increase in bilateral lower lobe consolidation, left greater than right, may represent worsening pneumonia, with concern for underlying neoplasm  - Patient says that she has had similar CT findings in the past   - Patient's pulmonologist, Dr Boyle, was consulted, said he will see patient while patient inpatient and will address CT findings    -RVP negative   -Mone PRN   -2L NC at night -Patient never been hospitalized for COPD, endorsing only productive cough for 1 week, no respiratory symptoms, no URI symptoms, sating above 95% on RA   -CXR: Right basilar strand-like opacities compatible with subsegmental atelectatic changes or scarring. Persistent left basilar/retrocardiac opacification with obscured hemidiaphragm and blunted left CP angle could be due to combination of pleural effusion and underlying airspace consolidation  - CT chest: Interval increase in bilateral lower lobe consolidation, left greater than right, may represent worsening pneumonia, with concern for underlying neoplasm  - Patient says that she has had similar CT findings in the past   - Patient's pulmonologist, Dr Boyle, was consulted, appreciating recs,  add symbicort bid and spiriva QD, nebs PRN, Abnormal chest CT: stable nodules upper lobes but increased density in bilat lower lobes pneumonia vs malignancy.  Given cough and green sputum would give 7 day course augmentin.  Patient can then follow up with Dr. Boyle in the office for a repeat CT chest in 4 weeks to reevaluate.  -RVP negative   -Duonebs PRN   -2L NC at night

## 2018-01-26 NOTE — ADVANCED PRACTICE NURSE CONSULT - REASON FOR CONSULT
Patient seen on skin care rounds after wound care referral received for assessment of skin impairment and recommendations of topical management. Chart reviewed: Serum albumin 2.9gdL, serum WBC 9.59, blood culture negative, Grey 18, BMI 30.7kg/m2, DNR status, patient interviewed: reports using Unna boots in the past for edema of lower legs but has not used them in 6 months because edema was under control. Unna boots used to be applied by primary care MD and visiting nurse services. Patient reports being followed by Podiatry outpatient for nail and foot care. Patient H/O HLD, COPD, GERD, seizures, osteoporosis, osteoarthritis, lung nodule, MDS, chronic LE edema and recurrent LE cellulitis. Presents with worsening b/l LE edema accompanying by erythema and discharge from b/l LE's. Admitted with cellulitis of lower legs.

## 2018-01-26 NOTE — DISCHARGE NOTE ADULT - CARE PLAN
Principal Discharge DX:	Bilateral leg edema  Goal:	follow wound care instructions and follow up in wound care clinic  Assessment and plan of treatment:	You came into the hospital because of bilateral leg swelling. You did not exhibit signs of infection. You were seen by the wound care team while you were here, and you should follow up with the wound care instruction listed below. You should follow up with the [facility] ([phone]) or primary care MD for wound care/wrapping of lower legs.  Secondary Diagnosis:	COPD (chronic obstructive pulmonary disease)  Goal:	finish Augmentin 7 day course, f/u with Dr. Boyle about repeat CT imaging  Assessment and plan of treatment:	You were seen by your pulmonologist while you were in the hospital. He recommended symbicort BID and spiriva QD. You had a CT chest with stable nodules upper lobes but increased density in bilateral lower lobes, which can either be pneumonia or malignancy. You were started on a 7 day course of Augmentin that you will finish at home. You should follow up with Dr. Boyle in the office for a repeat CT chest in 4 weeks to reevaluate.

## 2018-01-26 NOTE — DISCHARGE NOTE ADULT - PATIENT PORTAL LINK FT
“You can access the Follow[facility] Patient Portal, offered by [facility], by registering with the following website: [email or web address]”

## 2018-01-26 NOTE — ADVANCED PRACTICE NURSE CONSULT - ASSESSMENT
A&Ox3, bedbound during assessment, patient reports minimal ambulation at home with walker to bathroom but primarily chair bound, incontinent of urine during assessment, patient reports continence of urine and stool at home, occasionally wears diaper if patient is going out of house for extended length of time. Skin warm, dry with increased moisture in intertriginous folds, adequate skin turgor.    Bilateral lower extremities with hemosiderin staining. Dry, flaky skin. Thin, shiny, taught skin. Thickened-yellow overgrown toenails. Multiple wounds present. No temperature changes noted. +2 Edema. Capillary refill < 3 seconds. Bilateral DP/PT pulses palpable, with biphasic doppler sounds. (-) pallor on elevation and dependent rubor. Denies numbness/tingling of lower legs/feet. Left first toe distal tip with nonblanchable erythema measures [id number] (patient reports that podiatrist is aware of erythema). Right great toe distal tip nonblanchable erythema measures [id number] (patient also reports that podiatrist monitors area). Toe/bone deformities of left foot-4th toe under 3rd toe and right foot 1st and 4th toes. Right foot 2nd and 3rd toes patient reports were hammertoes that fused together, no toenails noted on either toes. Stable callus noted on bilateral feet, plantar heels and right plantar foot between 2nd and 4th metatarsal heads.    Right posterior lower leg with venous insufficiency wound, measures [id number]. Wound base 100% exposed pink, moist dermis. Moderate amount of serous drainage, no odor. Periwound skin with hyperpigmentation and generalized hemosiderin staining of lower leg. No induration, no increased warmth, no erythema. Goal of care: manage exudate, maintain moist environment for wound healing.    Left posterior lower leg with venous insufficiency wound, measures [id number]. Wound base 100% exposed pink, moist dermis. Moderate amount of serous drainage, no odor. Periwound skin with hyperpigmentation and generalized hemosiderin staining of lower leg. No induration, no increased warmth, no erythema. Goal of care: manage exudate, maintain moist environment for wound healing.    Moisture associated dermatitis/incontinence associated dermatitis of bilateral buttock and posterior upper thighs: hyperpigmentation noted with purple hue, suspected candidiasis. Sacral fold noted to have hypopigmentation and fissure.    Topical recommendations discussed with patient/son. Plan to follow up with outpatient primary MD but also provided patient with wound care center information.

## 2018-01-26 NOTE — PROGRESS NOTE ADULT - PROBLEM SELECTOR PLAN 6
Lovenox 40 mg daily     Brionna Tay M.D.   PGY-1 | Internal Medicine   [phone] | [phone]  After 7 pm covering pager [phone]

## 2018-01-26 NOTE — PROGRESS NOTE ADULT - SUBJECTIVE AND OBJECTIVE BOX
PULMONARY PROGRESS NOTE    CLAUDIA TRUJILLO  MRN-[id number]    Patient is a 77y old  Female who presents with a chief complaint of LE swelling (24 Jan 2018 18:08)      HPI:  -has cough with green sputum, breathing comfortably, no chest pain or dyspnea.  leg swelling has improved.  Had abnormal CT chest.    ROS:   -afebrile  -no N/V/D    ACTIVE MEDICATION LIST:  MEDICATIONS  (STANDING):  allopurinol 100 milliGRAM(s) Oral daily  atorvastatin 20 milliGRAM(s) Oral at bedtime  clotrimazole 1% Cream 1 Application(s) Topical two times a day  docusate sodium 100 milliGRAM(s) Oral two times a day  enoxaparin Injectable 40 milliGRAM(s) SubCutaneous every 24 hours  furosemide    Tablet 40 milliGRAM(s) Oral daily  gabapentin 800 milliGRAM(s) Oral three times a day  pantoprazole    Tablet 40 milliGRAM(s) Oral before breakfast  senna 2 Tablet(s) Oral at bedtime  sodium phosphate IVPB 15 milliMole(s) IV Intermittent once  ursodiol Capsule 300 milliGRAM(s) Oral every 8 hours    MEDICATIONS  (PRN):  ALBUTerol/ipratropium for Nebulization 3 milliLiter(s) Nebulizer every 6 hours PRN Shortness of Breath and/or Wheezing  traMADol 50 milliGRAM(s) Oral two times a day PRN moderate or severe pain      EXAM:  Vital Signs Last 24 Hrs  T(C): 37.3 (26 Jan 2018 05:15), Max: 37.3 (26 Jan 2018 05:15)  T(F): 99.1 (26 Jan 2018 05:15), Max: 99.1 (26 Jan 2018 05:15)  HR: 83 (26 Jan 2018 05:15) (80 - 83)  BP: 138/63 (26 Jan 2018 05:15) (138/63 - 149/81)  BP(mean): --  RR: 18 (26 Jan 2018 05:15) (18 - 20)  SpO2: 96% (26 Jan 2018 05:15) (93% - 96%)    GENERAL: The patient is awake and alert in no apparent distress.     SKIN: Warm, dry    LUNGS: decreased bs bilat, no wheeze    HEART: S1/S2    ABDOMEN: +BS, Soft, Nontender    EXTREMITIES: No clubbing, cyanosis    LABS/IMGAING: reviewed                          10.3   5.57  )-----------( 144      ( 26 Jan 2018 06:40 )             32.4     01-26    141  |  107  |  14  ----------------------------<  82  3.8   |  23  |  0.88    Ca    8.0<L>      26 Jan 2018 06:40  Phos  2.2     01-26  Mg     2.0     01-26    TPro  6.2  /  Alb  2.5<L>  /  TBili  0.3  /  DBili  x   /  AST  12  /  ALT  5   /  AlkPhos  103  01-26    < from: CT Chest No Cont (01.25.18 @ 10:40) >  IMPRESSION:  Interval increase in bilateral lower lobe consolidation, left greater   than right; this may represent worsening pneumonia, with concern for   underlying neoplasm. Short interval follow-up evaluation with CT is   recommended for resolution.    Increased size of a node superior tothe left pulmonary vein, possibly   reactive. Otherwise, unchanged mediastinal adenopathy.    < end of copied text >      PROBLEM LIST:  77y Female with HEALTH ISSUES - PROBLEM Dx:  Abnormal chest CT  GERD (gastroesophageal reflux disease)  Hyperlipidemia  COPD (chronic obstructive pulmonary disease)  Bilateral leg edema    RECS:  -COPD: add symbicort bid and spiriva QD, nebs PRN  -Abnormal chest CT: stable nodules upper lobes but increased density in bilat lower lobes pneumonia vs malignancy.  Given cough and green sputum would give 7 day course augmentin.  Patient can then follow up with  in the office for a repeat CT chest in 4 weeks to reevaluate.  -DVT prophylaxis  -PT      Jenelle Barboza MD  [phone]

## 2018-01-26 NOTE — ADVANCED PRACTICE NURSE CONSULT - RECOMMEDATIONS
Recommend follow up care at [facility] ([phone], [address] [place] [address]) or primary care MD for wound care/wrapping of lower legs.    B/L LE: Cleanse with soap and water, pat dry. Apply Liquid barrier film to periwound skin of open ulcers. Cover with foam without border. Apply Sween 24 moisturizing lotion to dry/scaly skin of lower legs. Wrap with kerlix, and ACE bandage. Change daily while in hospital. Change very 72 hours at home.    B/L buttock and posterior thighs: Cleanse with skin cleanser, pat dry. Apply Antifungal barrier cream twice a day.    Continue low air loss bed therapy, continue to turn & reposition q2h, continue moisture management with antifungal barrier creams & single breathable pad, continue measures to decrease friction/shear/pressure.     Please call wound care service line is further assistance is needed ([phone]).

## 2018-01-26 NOTE — DISCHARGE NOTE ADULT - HOME CARE AGENCY
[facility] [phone] Nurse to visit 24-48 hrs after discharge, therapist will call to set up a visit

## 2018-01-26 NOTE — PROGRESS NOTE ADULT - SUBJECTIVE AND OBJECTIVE BOX
Patient is a 77y old  Female who presents with a chief complaint of LE swelling (24 Jan 2018 18:08)        SUBJECTIVE / OVERNIGHT EVENTS: Patient had no acute events overnight. Patient seen and examined at bedside this morning.     ROS: [ - ] Fever [ - ] Chills [ - ] Nausea/Vomiting [ - ] Chest Pain [ - ] Shortness of breath     MEDICATIONS  (STANDING):  allopurinol 100 milliGRAM(s) Oral daily  atorvastatin 20 milliGRAM(s) Oral at bedtime  clotrimazole 1% Cream 1 Application(s) Topical two times a day  docusate sodium 100 milliGRAM(s) Oral two times a day  enoxaparin Injectable 40 milliGRAM(s) SubCutaneous every 24 hours  furosemide    Tablet 40 milliGRAM(s) Oral daily  gabapentin 800 milliGRAM(s) Oral three times a day  pantoprazole    Tablet 40 milliGRAM(s) Oral before breakfast  senna 2 Tablet(s) Oral at bedtime  ursodiol Capsule 300 milliGRAM(s) Oral every 8 hours    MEDICATIONS  (PRN):  ALBUTerol/ipratropium for Nebulization 3 milliLiter(s) Nebulizer every 6 hours PRN Shortness of Breath and/or Wheezing  traMADol 50 milliGRAM(s) Oral two times a day PRN moderate or severe pain      Vital Signs Last 24 Hrs  T(C): 37.3 (26 Jan 2018 05:15), Max: 37.3 (26 Jan 2018 05:15)  T(F): 99.1 (26 Jan 2018 05:15), Max: 99.1 (26 Jan 2018 05:15)  HR: 83 (26 Jan 2018 05:15) (80 - 83)  BP: 138/63 (26 Jan 2018 05:15) (138/63 - 149/81)  BP(mean): --  RR: 18 (26 Jan 2018 05:15) (18 - 20)  SpO2: 96% (26 Jan 2018 05:15) (93% - 96%)  CAPILLARY BLOOD GLUCOSE        I&O's Summary      PHYSICAL EXAM  GENERAL: NAD, laying comfortably in bed   HEAD:  Atraumatic, Normocephalic  EYES: EOMI b/l, PERRLA b/l, conjunctiva and sclera clear  NECK: Supple, No JVD, No LAD   CHEST/LUNG: Ronchi upper lobe b/l  HEART: Regular rate and rhythm; S1 and S2 present, No murmurs, rubs, or gallops  ABDOMEN: Soft, Nontender, Nondistended; Bowel sounds present  EXTREMITIES:  2+ Peripheral Pulses, 2+ edema in b/l LE's, erythematous, breaks in skin, no discharge noted, consistent with chronic venous stasis changes, tender to palpation   NEURO: AAOx3, non-focal, 4/5 strength LE b/l, diminished because of pain    LABS:                        10.7   9.59  )-----------( 130      ( 24 Jan 2018 11:51 )             33.3     01-24    139  |  104  |  18  ----------------------------<  91  4.6   |  22  |  1.21    Ca    8.6      24 Jan 2018 11:51    TPro  6.9  /  Alb  2.9<L>  /  TBili  0.6  /  DBili  x   /  AST  21  /  ALT  8   /  AlkPhos  122<H>  01-24                RADIOLOGY & ADDITIONAL TESTS:    Imaging Personally Reviewed:    < from: CT Chest No Cont (01.25.18 @ 10:40) >  IMPRESSION:  Interval increase in bilateral lower lobe consolidation, left greater   than right; this may represent worsening pneumonia, with concern for   underlying neoplasm. Short interval follow-up evaluation with CT is   recommended for resolution.    Increased size of a node superior tothe left pulmonary vein, possibly   reactive. Otherwise, unchanged mediastinal adenopathy. Patient is a 77y old  Female who presents with a chief complaint of LE swelling (24 Jan 2018 18:08)        SUBJECTIVE / OVERNIGHT EVENTS: Patient had no acute events overnight. Patient seen and examined at bedside this morning. Patient endorses that leg swelling and pain has improved today. Patient also endorses improved cough.     ROS: [ - ] Fever [ - ] Chills [ - ] Nausea/Vomiting [ - ] Chest Pain [ - ] Shortness of breath     MEDICATIONS  (STANDING):  allopurinol 100 milliGRAM(s) Oral daily  atorvastatin 20 milliGRAM(s) Oral at bedtime  clotrimazole 1% Cream 1 Application(s) Topical two times a day  docusate sodium 100 milliGRAM(s) Oral two times a day  enoxaparin Injectable 40 milliGRAM(s) SubCutaneous every 24 hours  furosemide    Tablet 40 milliGRAM(s) Oral daily  gabapentin 800 milliGRAM(s) Oral three times a day  pantoprazole    Tablet 40 milliGRAM(s) Oral before breakfast  senna 2 Tablet(s) Oral at bedtime  ursodiol Capsule 300 milliGRAM(s) Oral every 8 hours    MEDICATIONS  (PRN):  ALBUTerol/ipratropium for Nebulization 3 milliLiter(s) Nebulizer every 6 hours PRN Shortness of Breath and/or Wheezing  traMADol 50 milliGRAM(s) Oral two times a day PRN moderate or severe pain      Vital Signs Last 24 Hrs  T(C): 37.3 (26 Jan 2018 05:15), Max: 37.3 (26 Jan 2018 05:15)  T(F): 99.1 (26 Jan 2018 05:15), Max: 99.1 (26 Jan 2018 05:15)  HR: 83 (26 Jan 2018 05:15) (80 - 83)  BP: 138/63 (26 Jan 2018 05:15) (138/63 - 149/81)  BP(mean): --  RR: 18 (26 Jan 2018 05:15) (18 - 20)  SpO2: 96% (26 Jan 2018 05:15) (93% - 96%)  CAPILLARY BLOOD GLUCOSE        I&O's Summary      PHYSICAL EXAM  GENERAL: NAD, laying comfortably in bed   HEAD:  Atraumatic, Normocephalic  EYES: EOMI b/l, PERRLA b/l, conjunctiva and sclera clear  NECK: Supple, No JVD, No LAD   CHEST/LUNG: Ronchi upper lobe b/l  HEART: Regular rate and rhythm; S1 and S2 present, No murmurs, rubs, or gallops  ABDOMEN: Soft, Nontender, Nondistended; Bowel sounds present  EXTREMITIES:  2+ Peripheral Pulses, 2+ edema in b/l LE's but improved from prior exams, erythematous, breaks in skin, no discharge noted, consistent with chronic venous stasis changes,  to palpation   NEURO: AAOx3, non-focal, 4/5 strength LE b/l, diminished because of pain    LABS:                        10.7   9.59  )-----------( 130      ( 24 Jan 2018 11:51 )             33.3     01-24    139  |  104  |  18  ----------------------------<  91  4.6   |  22  |  1.21    Ca    8.6      24 Jan 2018 11:51    TPro  6.9  /  Alb  2.9<L>  /  TBili  0.6  /  DBili  x   /  AST  21  /  ALT  8   /  AlkPhos  122<H>  01-24                RADIOLOGY & ADDITIONAL TESTS:    Imaging Personally Reviewed:    < from: CT Chest No Cont (01.25.18 @ 10:40) >  IMPRESSION:  Interval increase in bilateral lower lobe consolidation, left greater   than right; this may represent worsening pneumonia, with concern for   underlying neoplasm. Short interval follow-up evaluation with CT is   recommended for resolution.    Increased size of a node superior tothe left pulmonary vein, possibly   reactive. Otherwise, unchanged mediastinal adenopathy. Patient is a 77y old  Female who presents with a chief complaint of LE swelling (24 Jan 2018 18:08)        SUBJECTIVE / OVERNIGHT EVENTS: Patient had no acute events overnight. Patient seen and examined at bedside this morning. Patient endorses that leg swelling and pain has improved today. Patient also endorses improved cough.     ROS: [ - ] Fever [ - ] Chills [ - ] Nausea/Vomiting [ - ] Chest Pain [ - ] Shortness of breath     MEDICATIONS  (STANDING):  allopurinol 100 milliGRAM(s) Oral daily  atorvastatin 20 milliGRAM(s) Oral at bedtime  clotrimazole 1% Cream 1 Application(s) Topical two times a day  docusate sodium 100 milliGRAM(s) Oral two times a day  enoxaparin Injectable 40 milliGRAM(s) SubCutaneous every 24 hours  furosemide    Tablet 40 milliGRAM(s) Oral daily  gabapentin 800 milliGRAM(s) Oral three times a day  pantoprazole    Tablet 40 milliGRAM(s) Oral before breakfast  senna 2 Tablet(s) Oral at bedtime  ursodiol Capsule 300 milliGRAM(s) Oral every 8 hours    MEDICATIONS  (PRN):  ALBUTerol/ipratropium for Nebulization 3 milliLiter(s) Nebulizer every 6 hours PRN Shortness of Breath and/or Wheezing  traMADol 50 milliGRAM(s) Oral two times a day PRN moderate or severe pain      Vital Signs Last 24 Hrs  T(C): 37.3 (26 Jan 2018 05:15), Max: 37.3 (26 Jan 2018 05:15)  T(F): 99.1 (26 Jan 2018 05:15), Max: 99.1 (26 Jan 2018 05:15)  HR: 83 (26 Jan 2018 05:15) (80 - 83)  BP: 138/63 (26 Jan 2018 05:15) (138/63 - 149/81)  BP(mean): --  RR: 18 (26 Jan 2018 05:15) (18 - 20)  SpO2: 96% (26 Jan 2018 05:15) (93% - 96%)  CAPILLARY BLOOD GLUCOSE        I&O's Summary      PHYSICAL EXAM  GENERAL: NAD, laying comfortably in bed   HEAD:  Atraumatic, Normocephalic  EYES: EOMI b/l, PERRLA b/l, conjunctiva and sclera clear  NECK: Supple, No JVD, No LAD   CHEST/LUNG: clear to auscultation bilaterally   HEART: Regular rate and rhythm; S1 and S2 present, No murmurs, rubs, or gallops  ABDOMEN: Soft, Nontender, Nondistended; Bowel sounds present  EXTREMITIES:  2+ Peripheral Pulses, 2+ edema in b/l LE's but improved from prior exams, erythematous, breaks in skin, no discharge noted, consistent with chronic venous stasis changes,  to palpation   NEURO: AAOx3, non-focal, 4/5 strength LE b/l, diminished because of pain    LABS:                        10.7   9.59  )-----------( 130      ( 24 Jan 2018 11:51 )             33.3     01-24    139  |  104  |  18  ----------------------------<  91  4.6   |  22  |  1.21    Ca    8.6      24 Jan 2018 11:51    TPro  6.9  /  Alb  2.9<L>  /  TBili  0.6  /  DBili  x   /  AST  21  /  ALT  8   /  AlkPhos  122<H>  01-24                RADIOLOGY & ADDITIONAL TESTS:    Imaging Personally Reviewed:    < from: CT Chest No Cont (01.25.18 @ 10:40) >  IMPRESSION:  Interval increase in bilateral lower lobe consolidation, left greater   than right; this may represent worsening pneumonia, with concern for   underlying neoplasm. Short interval follow-up evaluation with CT is   recommended for resolution.    Increased size of a node superior tothe left pulmonary vein, possibly   reactive. Otherwise, unchanged mediastinal adenopathy. Patient is a 77y old  Female who presents with a chief complaint of LE swelling (24 Jan 2018 18:08)    SUBJECTIVE / OVERNIGHT EVENTS: Patient had no acute events overnight. Patient seen and examined at bedside this morning. Patient endorses that leg swelling and pain has improved today. Patient also endorses improved cough.     ROS: [ - ] Fever [ - ] Chills [ - ] Nausea/Vomiting [ - ] Chest Pain [ - ] Shortness of breath     MEDICATIONS  (STANDING):  allopurinol 100 milliGRAM(s) Oral daily  atorvastatin 20 milliGRAM(s) Oral at bedtime  clotrimazole 1% Cream 1 Application(s) Topical two times a day  docusate sodium 100 milliGRAM(s) Oral two times a day  enoxaparin Injectable 40 milliGRAM(s) SubCutaneous every 24 hours  furosemide    Tablet 40 milliGRAM(s) Oral daily  gabapentin 800 milliGRAM(s) Oral three times a day  pantoprazole    Tablet 40 milliGRAM(s) Oral before breakfast  senna 2 Tablet(s) Oral at bedtime  ursodiol Capsule 300 milliGRAM(s) Oral every 8 hours    MEDICATIONS  (PRN):  ALBUTerol/ipratropium for Nebulization 3 milliLiter(s) Nebulizer every 6 hours PRN Shortness of Breath and/or Wheezing  traMADol 50 milliGRAM(s) Oral two times a day PRN moderate or severe pain    Vital Signs Last 24 Hrs  T(C): 37.3 (26 Jan 2018 05:15), Max: 37.3 (26 Jan 2018 05:15)  T(F): 99.1 (26 Jan 2018 05:15), Max: 99.1 (26 Jan 2018 05:15)  HR: 83 (26 Jan 2018 05:15) (80 - 83)  BP: 138/63 (26 Jan 2018 05:15) (138/63 - 149/81)  BP(mean): --  RR: 18 (26 Jan 2018 05:15) (18 - 20)  SpO2: 96% (26 Jan 2018 05:15) (93% - 96%)    PHYSICAL EXAM  GENERAL: NAD, laying comfortably in bed   HEAD:  Atraumatic, Normocephalic  EYES: EOMI b/l, PERRLA b/l, conjunctiva and sclera clear  NECK: Supple, No JVD, No LAD   CHEST/LUNG: clear to auscultation bilaterally   HEART: Regular rate and rhythm; S1 and S2 present, No murmurs, rubs, or gallops  ABDOMEN: Soft, Nontender, Nondistended; Bowel sounds present  EXTREMITIES:  2+ Peripheral Pulses, 2+ edema in b/l LE's but improved from prior exams, erythematous, breaks in skin, no discharge noted, consistent with chronic venous stasis changes,  to palpation   NEURO: AAOx3, non-focal, 4/5 strength LE b/l, diminished because of pain    LABS:                        10.7   9.59  )-----------( 130      ( 24 Jan 2018 11:51 )             33.3     01-24    139  |  104  |  18  ----------------------------<  91  4.6   |  22  |  1.21    Ca    8.6      24 Jan 2018 11:51    TPro  6.9  /  Alb  2.9<L>  /  TBili  0.6  /  DBili  x   /  AST  21  /  ALT  8   /  AlkPhos  122<H>  01-24      RADIOLOGY & ADDITIONAL TESTS:    Imaging Personally Reviewed:    < from: CT Chest No Cont (01.25.18 @ 10:40) >  IMPRESSION:  Interval increase in bilateral lower lobe consolidation, left greater   than right; this may represent worsening pneumonia, with concern for   underlying neoplasm. Short interval follow-up evaluation with CT is   recommended for resolution.    Increased size of a node superior tothe left pulmonary vein, possibly   reactive. Otherwise, unchanged mediastinal adenopathy.

## 2018-01-26 NOTE — DISCHARGE NOTE ADULT - CARE PROVIDER_API CALL
Dr. Boyle,   [address], [place], [place] [address] [phone]  Phone: (   )    -  Fax: (   )    -

## 2018-01-26 NOTE — DISCHARGE NOTE ADULT - HOSPITAL COURSE
HPI: 77 year old female with PMHx significant for HLD, COPD, GERD, chronic LE edema (on Lasix 40 mg) and recurrent LE cellulitis, presents with worsening b/l LE edema accompanying by erythema and discharge from b/l LE's. Patient denies worsening of pain or erythema related to her swelling. Patient does endorse clear fluid discharge from breaks in the skin. Patient has been having worsening symptoms for the past couple of days. Patient uses walker at baseline, and has lifting chair at home, and today the swelling was so significant that she could not stand on her feet.  Patient has had this happen multiple times in the past, and it improved with elevation and wrapping with Unna boots, her PCP manages her LE swelling regularly. Patient has gotten antibiotics in the past, most of the time ciprofloxacin. Patient took 1 pill of leftover ciprofloxacin this morning. Patient endorses productive cough with green sputum for 1 week. Patient sees pulmonologist, Dr. Boyle for COPD, takes Spiriva and another medication that she can not recall. Patient denies fevers, chills, shortness of breath, chest pain, n/v/d, abdominal pain, numbness, tingling, weakness, urinary symptoms.      Hospital Course: In ED, Temp 99.6, HR 90, 127/48, sating 97% RA. Labs notable for Hgb 10.7, platelet 130, albumin 2.9, alk phos 122. WC normal. Patient given 1 dose each of ciprofloxacin and cefazolin. Patient's legs did exhibit any signs of infection, and patient was monitored off of antibiotics. Patient was seen by wound care, who recommended wound care as below.     Patient had a CT chest with stable nodules in upper lobes but increased density in bilateral lower lobes, representing pneumonia or malignancy. Patient was seen by Pulmonologist Dr. Boyle, who recommended symbicort BID and spiriva QD, patient to be started on a 7 day course of Augmentin, and that patient should follow up with Dr. Boyle in the office for a repeat CT chest in 4 weeks to reevaluate.    Patient was sen by physical therapy while she was in the hospital, and they recommended subacute rehab, but patient declined and wanted to return home. Case management saw patient and will set up home health services.     Patient should follow up with the [facility] ([phone], [address] [place] [address]) or primary care MD for wound care/wrapping of lower legs.      Wound Care Instructions:   B/L LE: Cleanse with soap and water, pat dry. Apply Liquid barrier film to periwound skin of open ulcers. Cover with foam without border. Apply Sween 24 moisturizing lotion to dry/scaly skin of lower legs. Wrap with kerlix, and ACE bandage. Change daily while in hospital. Change very 72 hours at home.    B/L buttock and posterior thighs: Cleanse with skin cleanser, pat dry. Apply Antifungal barrier cream twice a day.    Continue low air loss bed therapy, continue to turn & reposition q2h, continue moisture management with antifungal barrier creams & single breathable pad, continue measures to decrease friction/shear/pressure.

## 2018-01-27 VITALS
OXYGEN SATURATION: 96 % | TEMPERATURE: 98 F | WEIGHT: 181.66 LBS | DIASTOLIC BLOOD PRESSURE: 69 MMHG | RESPIRATION RATE: 18 BRPM | SYSTOLIC BLOOD PRESSURE: 136 MMHG | HEART RATE: 79 BPM

## 2018-01-27 DIAGNOSIS — E87.6 HYPOKALEMIA: ICD-10-CM

## 2018-01-27 LAB
BUN SERPL-MCNC: 13 MG/DL — SIGNIFICANT CHANGE UP (ref 7–23)
CALCIUM SERPL-MCNC: 7.8 MG/DL — LOW (ref 8.4–10.5)
CHLORIDE SERPL-SCNC: 110 MMOL/L — HIGH (ref 98–107)
CO2 SERPL-SCNC: 23 MMOL/L — SIGNIFICANT CHANGE UP (ref 22–31)
CREAT SERPL-MCNC: 0.91 MG/DL — SIGNIFICANT CHANGE UP (ref 0.5–1.3)
GLUCOSE SERPL-MCNC: 102 MG/DL — HIGH (ref 70–99)
HCT VFR BLD CALC: 31.7 % — LOW (ref 34.5–45)
HGB BLD-MCNC: 10 G/DL — LOW (ref 11.5–15.5)
MAGNESIUM SERPL-MCNC: 1.9 MG/DL — SIGNIFICANT CHANGE UP (ref 1.6–2.6)
MCHC RBC-ENTMCNC: 28.7 PG — SIGNIFICANT CHANGE UP (ref 27–34)
MCHC RBC-ENTMCNC: 31.5 % — LOW (ref 32–36)
MCV RBC AUTO: 90.8 FL — SIGNIFICANT CHANGE UP (ref 80–100)
NRBC # FLD: 0 — SIGNIFICANT CHANGE UP
PHOSPHATE SERPL-MCNC: 2.8 MG/DL — SIGNIFICANT CHANGE UP (ref 2.5–4.5)
PLATELET # BLD AUTO: 120 K/UL — LOW (ref 150–400)
PMV BLD: 11.7 FL — SIGNIFICANT CHANGE UP (ref 7–13)
POTASSIUM SERPL-MCNC: 3.3 MMOL/L — LOW (ref 3.5–5.3)
POTASSIUM SERPL-SCNC: 3.3 MMOL/L — LOW (ref 3.5–5.3)
RBC # BLD: 3.49 M/UL — LOW (ref 3.8–5.2)
RBC # FLD: 14.5 % — SIGNIFICANT CHANGE UP (ref 10.3–14.5)
SODIUM SERPL-SCNC: 145 MMOL/L — SIGNIFICANT CHANGE UP (ref 135–145)
WBC # BLD: 4.54 K/UL — SIGNIFICANT CHANGE UP (ref 3.8–10.5)
WBC # FLD AUTO: 4.54 K/UL — SIGNIFICANT CHANGE UP (ref 3.8–10.5)

## 2018-01-27 PROCEDURE — 99239 HOSP IP/OBS DSCHRG MGMT >30: CPT

## 2018-01-27 RX ORDER — BUDESONIDE AND FORMOTEROL FUMARATE DIHYDRATE 160; 4.5 UG/1; UG/1
2 AEROSOL RESPIRATORY (INHALATION)
Qty: 1 | Refills: 0 | OUTPATIENT
Start: 2018-01-27

## 2018-01-27 RX ORDER — TIOTROPIUM BROMIDE 18 UG/1
1 CAPSULE ORAL; RESPIRATORY (INHALATION)
Qty: 1 | Refills: 0 | OUTPATIENT
Start: 2018-01-27

## 2018-01-27 RX ORDER — POTASSIUM CHLORIDE 20 MEQ
40 PACKET (EA) ORAL ONCE
Qty: 0 | Refills: 0 | Status: COMPLETED | OUTPATIENT
Start: 2018-01-27 | End: 2018-01-27

## 2018-01-27 RX ADMIN — Medication 100 MILLIGRAM(S): at 06:57

## 2018-01-27 RX ADMIN — Medication 40 MILLIEQUIVALENT(S): at 11:24

## 2018-01-27 RX ADMIN — Medication 100 MILLIGRAM(S): at 11:24

## 2018-01-27 RX ADMIN — GABAPENTIN 800 MILLIGRAM(S): 400 CAPSULE ORAL at 06:56

## 2018-01-27 RX ADMIN — PANTOPRAZOLE SODIUM 40 MILLIGRAM(S): 20 TABLET, DELAYED RELEASE ORAL at 06:56

## 2018-01-27 RX ADMIN — BUDESONIDE AND FORMOTEROL FUMARATE DIHYDRATE 2 PUFF(S): 160; 4.5 AEROSOL RESPIRATORY (INHALATION) at 11:24

## 2018-01-27 RX ADMIN — Medication 1 TABLET(S): at 06:56

## 2018-01-27 RX ADMIN — URSODIOL 300 MILLIGRAM(S): 250 TABLET, FILM COATED ORAL at 06:56

## 2018-01-27 RX ADMIN — Medication 1 APPLICATION(S): at 06:56

## 2018-01-27 NOTE — PROGRESS NOTE ADULT - PROBLEM SELECTOR PLAN 3
-Patient with history of elevated alk phos  -c/w Ursodiol daily

## 2018-01-27 NOTE — PROGRESS NOTE ADULT - ATTENDING COMMENTS
Chronic LE swelling pt reports no fevers at home (max 99.8) and no increased in redness or pain, just increase swelling and weeping of legs.    -monitor off abx  -wound care consult  -needs compression    CXR abnormality w/ cough and phlegm, CT chest confirming b/l consolidations:   -checked RVP which is negative  -will c/s pts pulm re: how to proceed given clinical stability     PT eval    Care d/w pt and son
Chronic LE swelling: improving off abx, low suspicion for infection  -monitor off abx  -wound care consult--f/u recs   -needs compression    CXR abnormality w/ cough and phlegm, CT chest confirming b/l consolidations:   -appreciate pulm c/s  -will tx empirically w/ Augmentin, OP pulm f/u in 4 wks for repeat CT     Weakness  able to stand and take a few steps but still feels weaker than baseline, pt refuses SIRI states wants to go home    Possible dc home tomorrow am    Care d/w pt and son
Chronic LE swelling: improving off abx, low suspicion for infection  -monitor off abx  -wound care consult--f/u recs   -needs compression  -advised pt she needs to follow closely w/ OP vascular and possibly f/u with [facility] to prevent admission for LE swelling    CXR abnormality w/ cough and phlegm, CT chest confirming b/l consolidations:   -appreciate pulm c/s  -will tx empirically w/ Augmentin, OP pulm f/u in 4 wks for repeat CT     Weakness  able to stand and take a few steps but still feels weaker than baseline, pt refuses SIRI states wants to go home    feeling well today, ready to go home  dispo time 35 min  son present at beside and will be taking her home

## 2018-01-27 NOTE — PROGRESS NOTE ADULT - PROBLEM SELECTOR PLAN 2
-Patient never been hospitalized for COPD, endorsing only productive cough for 1 week, no respiratory symptoms, no URI symptoms, sating above 95% on RA   -CXR: Right basilar strand-like opacities compatible with subsegmental atelectatic changes or scarring. Persistent left basilar/retrocardiac opacification with obscured hemidiaphragm and blunted left CP angle could be due to combination of pleural effusion and underlying airspace consolidation  - CT chest: Interval increase in bilateral lower lobe consolidation, left greater than right, may represent worsening pneumonia, with concern for underlying neoplasm  - Patient says that she has had similar CT findings in the past   - Patient's pulmonologist, Dr Boyle, was consulted, appreciating recs,  add symbicort bid and spiriva QD, nebs PRN, Abnormal chest CT: stable nodules upper lobes but increased density in bilat lower lobes pneumonia vs malignancy.  Given cough and green sputum would give 7 day course augmentin.  Patient can then follow up with Dr. Boyle in the office for a repeat CT chest in 4 weeks to reevaluate.  -RVP negative   -Duonebs PRN   -2L NC at night

## 2018-01-27 NOTE — PROGRESS NOTE ADULT - SUBJECTIVE AND OBJECTIVE BOX
PULMONARY PROGRESS NOTE    CLAUDIA TRUJILLO  MRN-[id number]    Patient is a 77y old  Female who presents with a chief complaint of LE swelling (26 Jan 2018 15:02)      HPI:  pt feels significantly improved. Going home today has appt Dr Boyle as outpt  -    ROS:   -    ACTIVE MEDICATION LIST:  MEDICATIONS  (STANDING):  allopurinol 100 milliGRAM(s) Oral daily  amoxicillin  500 milliGRAM(s)/clavulanate 1 Tablet(s) Oral every 8 hours  atorvastatin 20 milliGRAM(s) Oral at bedtime  buDESOnide  80 MICROgram(s)/formoterol 4.5 MICROgram(s) Inhaler 2 Puff(s) Inhalation two times a day  clotrimazole 1% Cream 1 Application(s) Topical two times a day  docusate sodium 100 milliGRAM(s) Oral two times a day  enoxaparin Injectable 40 milliGRAM(s) SubCutaneous every 24 hours  furosemide    Tablet 40 milliGRAM(s) Oral daily  gabapentin 800 milliGRAM(s) Oral three times a day  pantoprazole    Tablet 40 milliGRAM(s) Oral before breakfast  senna 2 Tablet(s) Oral at bedtime  tiotropium 18 MICROgram(s) Capsule 1 Capsule(s) Inhalation at bedtime  ursodiol Capsule 300 milliGRAM(s) Oral every 8 hours    MEDICATIONS  (PRN):  ALBUTerol/ipratropium for Nebulization 3 milliLiter(s) Nebulizer every 6 hours PRN Shortness of Breath and/or Wheezing  traMADol 50 milliGRAM(s) Oral two times a day PRN moderate or severe pain      EXAM:  Vital Signs Last 24 Hrs  T(C): 36.5 (27 Jan 2018 05:15), Max: 37.6 (26 Jan 2018 21:12)  T(F): 97.7 (27 Jan 2018 05:15), Max: 99.7 (26 Jan 2018 21:12)  HR: 79 (27 Jan 2018 05:15) (77 - 84)  BP: 136/69 (27 Jan 2018 05:15) (123/55 - 141/62)  BP(mean): --  RR: 18 (27 Jan 2018 05:15) (17 - 18)  SpO2: 96% (27 Jan 2018 05:15) (94% - 96%)    GENERAL: The patient is awake and alert in no apparent distress.     LUNGS: Mild decrease breath sounds bases with rare crakle    HEART: Regular rate and rhythm without murmur.    LABS/IMAGING: reviewed      PROBLEM LIST:  77y Female with HEALTH ISSUES - PROBLEM Dx:  Elevated liver enzymes: Elevated liver enzymes  Need for prophylactic measure: Need for prophylactic measure  GERD (gastroesophageal reflux disease): GERD (gastroesophageal reflux disease)  Hyperlipidemia: Hyperlipidemia  COPD (chronic obstructive pulmonary disease): COPD (chronic obstructive pulmonary disease)  Bilateral leg edema: Bilateral leg edema            RECS:  D/C planning  Outpt f/u  Complete course rut Clark MD   [phone]

## 2018-01-27 NOTE — PROGRESS NOTE ADULT - PROBLEM SELECTOR PLAN 6
Lovenox 40 mg daily     Hermes Garcia M.D.   PGY-2 | Internal Medicine   Pager [phone] [id number] -c/w PPI

## 2018-01-27 NOTE — PROGRESS NOTE ADULT - SUBJECTIVE AND OBJECTIVE BOX
Patient is a 77y old  Female who presents with a chief complaint of LE swelling (26 Jan 2018 15:02)      SUBJECTIVE / OVERNIGHT EVENTS:    No acute overnight events. Reports chronic cough otherwise without dyspnea or chest pain. LE swelling improved.     ROS: ( - ) Fever, ( - )Chills,  ( - )Nausea/Vomiting, ( + ) Cough, ( - )Shortness of breath, (-  )Chest Pain    MEDICATIONS  (STANDING):  allopurinol 100 milliGRAM(s) Oral daily  amoxicillin  500 milliGRAM(s)/clavulanate 1 Tablet(s) Oral every 8 hours  atorvastatin 20 milliGRAM(s) Oral at bedtime  buDESOnide  80 MICROgram(s)/formoterol 4.5 MICROgram(s) Inhaler 2 Puff(s) Inhalation two times a day  clotrimazole 1% Cream 1 Application(s) Topical two times a day  docusate sodium 100 milliGRAM(s) Oral two times a day  enoxaparin Injectable 40 milliGRAM(s) SubCutaneous every 24 hours  furosemide    Tablet 40 milliGRAM(s) Oral daily  gabapentin 800 milliGRAM(s) Oral three times a day  pantoprazole    Tablet 40 milliGRAM(s) Oral before breakfast  potassium chloride    Tablet ER 40 milliEquivalent(s) Oral once  senna 2 Tablet(s) Oral at bedtime  tiotropium 18 MICROgram(s) Capsule 1 Capsule(s) Inhalation at bedtime  ursodiol Capsule 300 milliGRAM(s) Oral every 8 hours    MEDICATIONS  (PRN):  ALBUTerol/ipratropium for Nebulization 3 milliLiter(s) Nebulizer every 6 hours PRN Shortness of Breath and/or Wheezing  traMADol 50 milliGRAM(s) Oral two times a day PRN moderate or severe pain      T(C): 36.5 (01-27 @ 05:15), Max: 37.6 (01-26 @ 21:12)   HR: 79   BP: 136/69   RR: 18   SpO2: 96%    PHYSICAL EXAM:  GENERAL: NAD, laying comfortably in bed   HEAD:  Atraumatic, Normocephalic  EYES: EOMI b/l, PERRLA b/l, conjunctiva and sclera clear  NECK: Supple, No JVD, No LAD   CHEST/LUNG: clear to auscultation bilaterally   HEART: Regular rate and rhythm; S1 and S2 present, No murmurs, rubs, or gallops  ABDOMEN: Soft, Nontender, Nondistended; Bowel sounds present  EXTREMITIES:  2+ Peripheral Pulses, 2+ edema in b/l LE's but improved from prior exams, erythematous, breaks in skin, no discharge noted, consistent with chronic venous stasis changes,  to palpation   NEURO: AAOx3, non-focal, 4/5 strength LE b/l, diminished because of pain      LABS:                        10.0   4.54  )-----------( 120      ( 27 Jan 2018 06:50 )             31.7      01-27    145  |  110<H>  |  13  ----------------------------<  102<H>  3.3<L>   |  23  |  0.91    Ca    7.8<L>      27 Jan 2018 06:50  Phos  2.8     01-27  Mg     1.9     01-27    TPro  6.2  /  Alb  2.5<L>  /  TBili  0.3  /  DBili  x   /  AST  12  /  ALT  5   /  AlkPhos  103  01-26       CAPILLARY BLOOD GLUCOSE          RADIOLOGY & ADDITIONAL TESTS:    < from: CT Chest No Cont (01.25.18 @ 10:40) >  Interval increase in bilateral lower lobe consolidation, left greater   than right; this may represent worsening pneumonia, with concern for   underlying neoplasm. Short interval follow-up evaluation with CT is   recommended for resolution.    Increased size of a node superior tothe left pulmonary vein, possibly   reactive. Otherwise, unchanged mediastinal adenopathy.    < end of copied text >

## 2018-01-27 NOTE — PROGRESS NOTE ADULT - ASSESSMENT
77 year old female with PMHx significant for HLD, COPD, GERD, chronic LE edema (on Lasix 40 mg) and recurrent LE cellulitis, presents with worsening b/l LE edema accompanying by erythema and discharge from b/l LE's, exam findings consistent with Lipodermatosclerosis secondary to chronic venous insufficiency.

## 2018-01-27 NOTE — PROGRESS NOTE ADULT - PROBLEM SELECTOR PLAN 1
-Patient with history of LE swelling, chronic venous stasis, presenting with worsening of swelling accompanied by erythema and fluid discharge  -LE findings consistent with Lipodermatosclerosis secondary to chronic venous insufficiency  -Does not look infected at this time, patient endorses that there was no increased erythema or pain, only increased swelling, will monitor off of antibiotics   -will c/w Lasix 20 mg PO daily, allopurinol (unclear if patient with history of gout, on this daily at home), and gabapentin   -f/u wound care about management, including Unna boot   -f/u Blood culture  -Clotrimazole cream for toes
-Patient with history of LE swelling, chronic venous stasis, presenting with worsening of swelling accompanied by erythema and fluid discharge  -LE findings consistent with Lipodermatosclerosis secondary to chronic venous insufficiency  -Does not look infected at this time, patient endorses that there was no increased erythema or pain, only increased swelling, will monitor off of antibiotics   -will c/w Lasix 20 mg PO daily, allopurinol (unclear if patient with history of gout, on this daily at home), and gabapentin   -Will consult with wound care about management, including Unna boot   -f/u Blood culture  -Clotrimazole cream for toes
-Patient with history of LE swelling, chronic venous stasis, presenting with worsening of swelling accompanied by erythema and fluid discharge  -LE findings consistent with Lipodermatosclerosis secondary to chronic venous insufficiency  -Does not look infected at this time, patient endorses that there was no increased erythema or pain, only increased swelling, will monitor off of antibiotics   -will c/w Lasix 20 mg PO daily, allopurinol (unclear if patient with history of gout, on this daily at home), and gabapentin   -wound care as per recs  -Blood culture negative   -Clotrimazole cream for toes

## 2018-01-29 LAB
BACTERIA BLD CULT: SIGNIFICANT CHANGE UP
BACTERIA BLD CULT: SIGNIFICANT CHANGE UP

## 2018-01-31 ENCOUNTER — INPATIENT (INPATIENT)
Facility: HOSPITAL | Age: 78
LOS: 2 days | Discharge: NURSING, REHAB OR LONG-TERM CARE | End: 2018-02-03
Attending: HOSPITALIST | Admitting: HOSPITALIST
Payer: MEDICARE

## 2018-01-31 VITALS
DIASTOLIC BLOOD PRESSURE: 62 MMHG | RESPIRATION RATE: 20 BRPM | TEMPERATURE: 99 F | SYSTOLIC BLOOD PRESSURE: 148 MMHG | HEART RATE: 97 BPM | OXYGEN SATURATION: 100 %

## 2018-01-31 DIAGNOSIS — J18.9 PNEUMONIA, UNSPECIFIED ORGANISM: ICD-10-CM

## 2018-01-31 DIAGNOSIS — K21.9 GASTRO-ESOPHAGEAL REFLUX DISEASE WITHOUT ESOPHAGITIS: ICD-10-CM

## 2018-01-31 DIAGNOSIS — A41.9 SEPSIS, UNSPECIFIED ORGANISM: ICD-10-CM

## 2018-01-31 DIAGNOSIS — Z29.9 ENCOUNTER FOR PROPHYLACTIC MEASURES, UNSPECIFIED: ICD-10-CM

## 2018-01-31 DIAGNOSIS — J44.9 CHRONIC OBSTRUCTIVE PULMONARY DISEASE, UNSPECIFIED: ICD-10-CM

## 2018-01-31 DIAGNOSIS — E78.5 HYPERLIPIDEMIA, UNSPECIFIED: ICD-10-CM

## 2018-01-31 DIAGNOSIS — I10 ESSENTIAL (PRIMARY) HYPERTENSION: ICD-10-CM

## 2018-01-31 DIAGNOSIS — R19.7 DIARRHEA, UNSPECIFIED: ICD-10-CM

## 2018-01-31 DIAGNOSIS — J10.1 INFLUENZA DUE TO OTHER IDENTIFIED INFLUENZA VIRUS WITH OTHER RESPIRATORY MANIFESTATIONS: ICD-10-CM

## 2018-01-31 DIAGNOSIS — R60.0 LOCALIZED EDEMA: ICD-10-CM

## 2018-01-31 LAB
ALBUMIN SERPL ELPH-MCNC: 2.9 G/DL — LOW (ref 3.3–5)
ALP SERPL-CCNC: 103 U/L — SIGNIFICANT CHANGE UP (ref 40–120)
ALT FLD-CCNC: 11 U/L — SIGNIFICANT CHANGE UP (ref 4–33)
APPEARANCE UR: CLEAR — SIGNIFICANT CHANGE UP
AST SERPL-CCNC: 35 U/L — HIGH (ref 4–32)
B PERT DNA SPEC QL NAA+PROBE: SIGNIFICANT CHANGE UP
BASE EXCESS BLDV CALC-SCNC: 1 MMOL/L — SIGNIFICANT CHANGE UP
BASOPHILS # BLD AUTO: 0.02 K/UL — SIGNIFICANT CHANGE UP (ref 0–0.2)
BASOPHILS NFR BLD AUTO: 0.4 % — SIGNIFICANT CHANGE UP (ref 0–2)
BILIRUB SERPL-MCNC: 0.3 MG/DL — SIGNIFICANT CHANGE UP (ref 0.2–1.2)
BILIRUB UR-MCNC: NEGATIVE — SIGNIFICANT CHANGE UP
BLOOD GAS VENOUS - CREATININE: 0.87 MG/DL — SIGNIFICANT CHANGE UP (ref 0.5–1.3)
BLOOD UR QL VISUAL: NEGATIVE — SIGNIFICANT CHANGE UP
BUN SERPL-MCNC: 14 MG/DL — SIGNIFICANT CHANGE UP (ref 7–23)
C PNEUM DNA SPEC QL NAA+PROBE: NOT DETECTED — SIGNIFICANT CHANGE UP
CALCIUM SERPL-MCNC: 8.6 MG/DL — SIGNIFICANT CHANGE UP (ref 8.4–10.5)
CHLORIDE BLDV-SCNC: 106 MMOL/L — SIGNIFICANT CHANGE UP (ref 96–108)
CHLORIDE SERPL-SCNC: 102 MMOL/L — SIGNIFICANT CHANGE UP (ref 98–107)
CO2 SERPL-SCNC: 22 MMOL/L — SIGNIFICANT CHANGE UP (ref 22–31)
COLOR SPEC: YELLOW — SIGNIFICANT CHANGE UP
CREAT SERPL-MCNC: 0.9 MG/DL — SIGNIFICANT CHANGE UP (ref 0.5–1.3)
EOSINOPHIL # BLD AUTO: 0.06 K/UL — SIGNIFICANT CHANGE UP (ref 0–0.5)
EOSINOPHIL NFR BLD AUTO: 1.2 % — SIGNIFICANT CHANGE UP (ref 0–6)
FLUAV H1 2009 PAND RNA SPEC QL NAA+PROBE: POSITIVE — HIGH
FLUAV H1 RNA SPEC QL NAA+PROBE: NOT DETECTED — SIGNIFICANT CHANGE UP
FLUAV H3 RNA SPEC QL NAA+PROBE: NOT DETECTED — SIGNIFICANT CHANGE UP
FLUBV RNA SPEC QL NAA+PROBE: NOT DETECTED — SIGNIFICANT CHANGE UP
GAS PNL BLDV: 135 MMOL/L — LOW (ref 136–146)
GLUCOSE BLDV-MCNC: 105 — HIGH (ref 70–99)
GLUCOSE SERPL-MCNC: 98 MG/DL — SIGNIFICANT CHANGE UP (ref 70–99)
GLUCOSE UR-MCNC: NEGATIVE — SIGNIFICANT CHANGE UP
HADV DNA SPEC QL NAA+PROBE: NOT DETECTED — SIGNIFICANT CHANGE UP
HCO3 BLDV-SCNC: 24 MMOL/L — SIGNIFICANT CHANGE UP (ref 20–27)
HCOV 229E RNA SPEC QL NAA+PROBE: NOT DETECTED — SIGNIFICANT CHANGE UP
HCOV HKU1 RNA SPEC QL NAA+PROBE: NOT DETECTED — SIGNIFICANT CHANGE UP
HCOV NL63 RNA SPEC QL NAA+PROBE: NOT DETECTED — SIGNIFICANT CHANGE UP
HCOV OC43 RNA SPEC QL NAA+PROBE: NOT DETECTED — SIGNIFICANT CHANGE UP
HCT VFR BLD CALC: 35.1 % — SIGNIFICANT CHANGE UP (ref 34.5–45)
HCT VFR BLDV CALC: 35.4 % — SIGNIFICANT CHANGE UP (ref 34.5–45)
HGB BLD-MCNC: 11.1 G/DL — LOW (ref 11.5–15.5)
HGB BLDV-MCNC: 11.5 G/DL — SIGNIFICANT CHANGE UP (ref 11.5–15.5)
HMPV RNA SPEC QL NAA+PROBE: NOT DETECTED — SIGNIFICANT CHANGE UP
HPIV1 RNA SPEC QL NAA+PROBE: NOT DETECTED — SIGNIFICANT CHANGE UP
HPIV2 RNA SPEC QL NAA+PROBE: NOT DETECTED — SIGNIFICANT CHANGE UP
HPIV3 RNA SPEC QL NAA+PROBE: NOT DETECTED — SIGNIFICANT CHANGE UP
HPIV4 RNA SPEC QL NAA+PROBE: NOT DETECTED — SIGNIFICANT CHANGE UP
IMM GRANULOCYTES # BLD AUTO: 0.03 # — SIGNIFICANT CHANGE UP
IMM GRANULOCYTES NFR BLD AUTO: 0.6 % — SIGNIFICANT CHANGE UP (ref 0–1.5)
KETONES UR-MCNC: NEGATIVE — SIGNIFICANT CHANGE UP
LACTATE BLDV-MCNC: 1 MMOL/L — SIGNIFICANT CHANGE UP (ref 0.5–2)
LEUKOCYTE ESTERASE UR-ACNC: NEGATIVE — SIGNIFICANT CHANGE UP
LYMPHOCYTES # BLD AUTO: 0.95 K/UL — LOW (ref 1–3.3)
LYMPHOCYTES # BLD AUTO: 18.8 % — SIGNIFICANT CHANGE UP (ref 13–44)
M PNEUMO DNA SPEC QL NAA+PROBE: NOT DETECTED — SIGNIFICANT CHANGE UP
MCHC RBC-ENTMCNC: 28.7 PG — SIGNIFICANT CHANGE UP (ref 27–34)
MCHC RBC-ENTMCNC: 31.6 % — LOW (ref 32–36)
MCV RBC AUTO: 90.7 FL — SIGNIFICANT CHANGE UP (ref 80–100)
MONOCYTES # BLD AUTO: 0.41 K/UL — SIGNIFICANT CHANGE UP (ref 0–0.9)
MONOCYTES NFR BLD AUTO: 8.1 % — SIGNIFICANT CHANGE UP (ref 2–14)
MUCOUS THREADS # UR AUTO: SIGNIFICANT CHANGE UP
NEUTROPHILS # BLD AUTO: 3.58 K/UL — SIGNIFICANT CHANGE UP (ref 1.8–7.4)
NEUTROPHILS NFR BLD AUTO: 70.9 % — SIGNIFICANT CHANGE UP (ref 43–77)
NITRITE UR-MCNC: NEGATIVE — SIGNIFICANT CHANGE UP
NRBC # FLD: 0 — SIGNIFICANT CHANGE UP
PCO2 BLDV: 43 MMHG — SIGNIFICANT CHANGE UP (ref 41–51)
PH BLDV: 7.39 PH — SIGNIFICANT CHANGE UP (ref 7.32–7.43)
PH UR: 6 — SIGNIFICANT CHANGE UP (ref 4.6–8)
PLATELET # BLD AUTO: 133 K/UL — LOW (ref 150–400)
PMV BLD: 11.2 FL — SIGNIFICANT CHANGE UP (ref 7–13)
PO2 BLDV: 28 MMHG — LOW (ref 35–40)
POTASSIUM BLDV-SCNC: 4.3 MMOL/L — SIGNIFICANT CHANGE UP (ref 3.4–4.5)
POTASSIUM SERPL-MCNC: 5.8 MMOL/L — HIGH (ref 3.5–5.3)
POTASSIUM SERPL-SCNC: 5.8 MMOL/L — HIGH (ref 3.5–5.3)
PROT SERPL-MCNC: 7.3 G/DL — SIGNIFICANT CHANGE UP (ref 6–8.3)
PROT UR-MCNC: 30 MG/DL — HIGH
RBC # BLD: 3.87 M/UL — SIGNIFICANT CHANGE UP (ref 3.8–5.2)
RBC # FLD: 14.9 % — HIGH (ref 10.3–14.5)
RBC CASTS # UR COMP ASSIST: SIGNIFICANT CHANGE UP (ref 0–?)
RSV RNA SPEC QL NAA+PROBE: NOT DETECTED — SIGNIFICANT CHANGE UP
RV+EV RNA SPEC QL NAA+PROBE: NOT DETECTED — SIGNIFICANT CHANGE UP
SAO2 % BLDV: 45.8 % — LOW (ref 60–85)
SODIUM SERPL-SCNC: 135 MMOL/L — SIGNIFICANT CHANGE UP (ref 135–145)
SP GR SPEC: 1.02 — SIGNIFICANT CHANGE UP (ref 1–1.04)
SQUAMOUS # UR AUTO: SIGNIFICANT CHANGE UP
UROBILINOGEN FLD QL: NORMAL MG/DL — SIGNIFICANT CHANGE UP
WBC # BLD: 5.05 K/UL — SIGNIFICANT CHANGE UP (ref 3.8–10.5)
WBC # FLD AUTO: 5.05 K/UL — SIGNIFICANT CHANGE UP (ref 3.8–10.5)
WBC UR QL: SIGNIFICANT CHANGE UP (ref 0–?)

## 2018-01-31 PROCEDURE — 71250 CT THORAX DX C-: CPT | Mod: 26

## 2018-01-31 PROCEDURE — 99223 1ST HOSP IP/OBS HIGH 75: CPT | Mod: GC

## 2018-01-31 PROCEDURE — 71045 X-RAY EXAM CHEST 1 VIEW: CPT | Mod: 26

## 2018-01-31 RX ORDER — CIPROFLOXACIN LACTATE 400MG/40ML
400 VIAL (ML) INTRAVENOUS ONCE
Qty: 0 | Refills: 0 | Status: COMPLETED | OUTPATIENT
Start: 2018-01-31 | End: 2018-01-31

## 2018-01-31 RX ORDER — TIOTROPIUM BROMIDE 18 UG/1
1 CAPSULE ORAL; RESPIRATORY (INHALATION) AT BEDTIME
Qty: 0 | Refills: 0 | Status: DISCONTINUED | OUTPATIENT
Start: 2018-01-31 | End: 2018-02-03

## 2018-01-31 RX ORDER — LACTOBACILLUS ACIDOPHILUS 100MM CELL
1 CAPSULE ORAL
Qty: 0 | Refills: 0 | Status: DISCONTINUED | OUTPATIENT
Start: 2018-01-31 | End: 2018-02-03

## 2018-01-31 RX ORDER — CEFEPIME 1 G/1
1000 INJECTION, POWDER, FOR SOLUTION INTRAMUSCULAR; INTRAVENOUS ONCE
Qty: 0 | Refills: 0 | Status: COMPLETED | OUTPATIENT
Start: 2018-01-31 | End: 2018-01-31

## 2018-01-31 RX ORDER — ALLOPURINOL 300 MG
100 TABLET ORAL DAILY
Qty: 0 | Refills: 0 | Status: DISCONTINUED | OUTPATIENT
Start: 2018-01-31 | End: 2018-02-03

## 2018-01-31 RX ORDER — ATORVASTATIN CALCIUM 80 MG/1
20 TABLET, FILM COATED ORAL AT BEDTIME
Qty: 0 | Refills: 0 | Status: DISCONTINUED | OUTPATIENT
Start: 2018-01-31 | End: 2018-02-03

## 2018-01-31 RX ORDER — DIPHENHYDRAMINE HCL 50 MG
25 CAPSULE ORAL ONCE
Qty: 0 | Refills: 0 | Status: COMPLETED | OUTPATIENT
Start: 2018-01-31 | End: 2018-01-31

## 2018-01-31 RX ORDER — BUDESONIDE AND FORMOTEROL FUMARATE DIHYDRATE 160; 4.5 UG/1; UG/1
2 AEROSOL RESPIRATORY (INHALATION)
Qty: 0 | Refills: 0 | Status: DISCONTINUED | OUTPATIENT
Start: 2018-01-31 | End: 2018-02-03

## 2018-01-31 RX ORDER — FUROSEMIDE 40 MG
40 TABLET ORAL DAILY
Qty: 0 | Refills: 0 | Status: DISCONTINUED | OUTPATIENT
Start: 2018-01-31 | End: 2018-01-31

## 2018-01-31 RX ORDER — GABAPENTIN 400 MG/1
800 CAPSULE ORAL THREE TIMES A DAY
Qty: 0 | Refills: 0 | Status: DISCONTINUED | OUTPATIENT
Start: 2018-01-31 | End: 2018-02-03

## 2018-01-31 RX ORDER — ACETAMINOPHEN 500 MG
1000 TABLET ORAL ONCE
Qty: 0 | Refills: 0 | Status: COMPLETED | OUTPATIENT
Start: 2018-01-31 | End: 2018-01-31

## 2018-01-31 RX ORDER — ENOXAPARIN SODIUM 100 MG/ML
40 INJECTION SUBCUTANEOUS EVERY 24 HOURS
Qty: 0 | Refills: 0 | Status: DISCONTINUED | OUTPATIENT
Start: 2018-01-31 | End: 2018-02-03

## 2018-01-31 RX ORDER — VANCOMYCIN HCL 1 G
1000 VIAL (EA) INTRAVENOUS ONCE
Qty: 0 | Refills: 0 | Status: COMPLETED | OUTPATIENT
Start: 2018-01-31 | End: 2018-01-31

## 2018-01-31 RX ORDER — PANTOPRAZOLE SODIUM 20 MG/1
40 TABLET, DELAYED RELEASE ORAL
Qty: 0 | Refills: 0 | Status: DISCONTINUED | OUTPATIENT
Start: 2018-01-31 | End: 2018-02-03

## 2018-01-31 RX ORDER — FUROSEMIDE 40 MG
40 TABLET ORAL DAILY
Qty: 0 | Refills: 0 | Status: DISCONTINUED | OUTPATIENT
Start: 2018-01-31 | End: 2018-02-03

## 2018-01-31 RX ADMIN — Medication 200 MILLIGRAM(S): at 15:47

## 2018-01-31 RX ADMIN — ENOXAPARIN SODIUM 40 MILLIGRAM(S): 100 INJECTION SUBCUTANEOUS at 22:32

## 2018-01-31 RX ADMIN — BUDESONIDE AND FORMOTEROL FUMARATE DIHYDRATE 2 PUFF(S): 160; 4.5 AEROSOL RESPIRATORY (INHALATION) at 22:32

## 2018-01-31 RX ADMIN — Medication 250 MILLIGRAM(S): at 16:18

## 2018-01-31 RX ADMIN — TIOTROPIUM BROMIDE 1 CAPSULE(S): 18 CAPSULE ORAL; RESPIRATORY (INHALATION) at 22:32

## 2018-01-31 RX ADMIN — Medication 1 TABLET(S): at 22:37

## 2018-01-31 RX ADMIN — ATORVASTATIN CALCIUM 20 MILLIGRAM(S): 80 TABLET, FILM COATED ORAL at 22:32

## 2018-01-31 RX ADMIN — Medication 25 MILLIGRAM(S): at 16:30

## 2018-01-31 RX ADMIN — Medication 400 MILLIGRAM(S): at 13:29

## 2018-01-31 RX ADMIN — GABAPENTIN 800 MILLIGRAM(S): 400 CAPSULE ORAL at 22:32

## 2018-01-31 RX ADMIN — CEFEPIME 100 MILLIGRAM(S): 1 INJECTION, POWDER, FOR SOLUTION INTRAMUSCULAR; INTRAVENOUS at 15:19

## 2018-01-31 NOTE — ED ADULT NURSE REASSESSMENT NOTE - NS ED NURSE REASSESS COMMENT FT1
Pt c/o itchiness to the IV site on L wrist while receiving ciprofloxacin ivpb.  IV stopped immediately.  Swelling to the site noted.  MD Figueroa made aware.  Benadryl 25mg po given.  Wam pack applied.  Allergy status updated.  Being monitored closely.

## 2018-01-31 NOTE — ED PROVIDER NOTE - CARE PLAN
Principal Discharge DX:	Pneumonia Principal Discharge DX:	Pneumonia  Secondary Diagnosis:	Influenza A

## 2018-01-31 NOTE — H&P ADULT - FAMILY HISTORY
Family history of diabetes mellitus     Father  Still living? Unknown  Paternal family history of emphysema, Age at diagnosis: Age Unknown

## 2018-01-31 NOTE — H&P ADULT - NSHPREVIEWOFSYSTEMS_GEN_ALL_CORE
Constitutional: Fever, Fatigue, Weight loss.  Eyes: No recent vision problems or eye pain.  ENT: No congestion, ear pain. Sore throat.  Endocrine: No excess sweating, temperature intolerance.   Cardiovascular: Shortness of breath. No chest pain, palpitations, pre-syncope, syncope  Respiratory: Cough, congestion, wheezing.  Gastrointestinal: No abdominal pain, nausea, vomiting, or diarrhea.  Genitourinary: No dysuria, hematuria  Musculoskeletal: No joint swelling, joint pain  Neurologic: No headache, dizziness, focal weakness.   Skin: LE extremity rash, hematoma on the face, prupura

## 2018-01-31 NOTE — H&P ADULT - PROBLEM SELECTOR PLAN 3
- likely from the flu   - will finish home augmentin tomorrow, total 14 days.   - s/p vancomycin, ciprofloxacin and cefepime

## 2018-01-31 NOTE — H&P ADULT - ASSESSMENT
77 year old female with PMHx significant for HLD, COPD, GERD,  Lipodermatosclerosis secondary to chronic venous insufficiency with recurrent LE cellulitis presents for influenza A pneumonia.

## 2018-01-31 NOTE — ED ADULT NURSE NOTE - OBJECTIVE STATEMENT
Alert, awake, oriented x3.  Breathing 90% on RA, improved to 95% with n/c 2L/min.  Fall precaution in place.

## 2018-01-31 NOTE — H&P ADULT - PROBLEM SELECTOR PLAN 4
- continue with home budesonide/formoterol  - continue with home spiriva   - continue with NC 2L, O2 goal stat >90

## 2018-01-31 NOTE — ED PROVIDER NOTE - ATTENDING CONTRIBUTION TO CARE
77f, recently admitted for pna found on CT, improved on abx and discharged on augmentin. she initially improved, but then yesterday began to have fevers, worsening cough and generalized weakness. no uri symptoms otherwise, no n/v. exam, febrile, other vs wnl, nad. hs normal, lungs decreased ae at bases, no wheeze or crackles, abdo benign, skin normal, alert and oriented. viral vs worsening or new pna. cxr neg, labs thus far neg, rvp pending. cxr is normal so will get repeat CT, treat for hcap and will need readmission.

## 2018-01-31 NOTE — H&P ADULT - PROBLEM SELECTOR PLAN 5
- Lipodermatosclerosis secondary to chronic venous insufficiency  - No drainage appreciated.   - c/w furosemide and gabapentin   - f/u blood cultures.   - c/w clotrimazole cream for toes.

## 2018-01-31 NOTE — ED ADULT TRIAGE NOTE - CHIEF COMPLAINT QUOTE
pt recently DCd from [facility] 1/27 for BLLE cellulitis and PNA. was supposed to go to rehab to gain strength but refused. pt presents today d/t continued weakness, unable to walk, R leg more swollen than left but that is baseline as per pt. subjective fever and continued, cough LS clear pt did not take antipyretics today. .

## 2018-01-31 NOTE — ED ADULT NURSE NOTE - CHIEF COMPLAINT
The patient is a 77y Female complaining of weakness, fell yesterday while transfer to wheelchair, with minor hematoma to the L eyebrow, continue to have weakness.  Denies ac use.  Pt d/leonard from [facility] dx with pna, cellulitis of legs, completed oral abx today.  Pt advised to go to rehab, refused, now agrees to go.  Reports OA on both shoulders with limited range of motion.

## 2018-01-31 NOTE — H&P ADULT - PROBLEM SELECTOR PLAN 8
- patient with watery diarrhea at home, likely due to laxatives   - holding laxatives   - continue to monitor, also may be due to antibiotic use. Will add lactobacillus.

## 2018-01-31 NOTE — H&P ADULT - HISTORY OF PRESENT ILLNESS
77 year old female with PMHx significant for HLD, COPD, GERD, Chronic venous insufficiency chronic LE edema (on Lasix 40 mg) and recurrent LE cellulitis presents for worsening weakness, cough and sob for the past two days. She also states had fever at home 100.9. Patient also states that after being discharged she felt weak and had ground level fall at home hitting her head. She called life alert, but was not taken to the ED. She denied LOC, dizziness or lightheadedness  Patient states that about two weeks ago she developed similar symptoms and went to the hospital were she was treated for pneumonia and was discharged on Augmentin day 13/14, at that time rvp-neg, bc neg. Patient had water bowel movement at home, likely due to laxatives. She endorses LE edema and erythema which is at baseline.     In the ED, T98.5 HR74, /31, R18 saturating 98% on NC 2L. On CT scan, small left pleural fusion with as given ciprofloxacin with an allergic reaction, s/p benadryl.  Got vancomycin and cefepime.

## 2018-01-31 NOTE — ED PROVIDER NOTE - MEDICAL DECISION MAKING DETAILS
recently diagnosed with pneumonia, finished course of abx (augmantin), no improvement - still cough, febrile and with generalized weakness - cxr, labs, ekg, will cover for hcap and admit.

## 2018-01-31 NOTE — H&P ADULT - NSHPLABSRESULTS_GEN_ALL_CORE
11.1   5.05  )-----------( 133      ( 2018 12:20 )             35.1   Hgb Trend: 11.1<--, 10.0<--, 10.3<--    135  |  102  |  14  ----------------------------<  98  5.8<H>   |  22  |  0.90    Ca    8.6      2018 12:20  TPro  7.3  /  Alb  2.9<L>  /  TBili  0.3  /  DBili  x   /  AST  35<H>  /  ALT  11  /  AlkPhos  103      Creatinine Trend: 0.90<--, 0.91<--, 0.88<--, 1.21<--    Urinalysis Basic - ( 2018 17:15 )  Color: YELLOW / Appearance: CLEAR / S.019 / pH: 6.0  Gluc: NEGATIVE / Ketone: NEGATIVE  / Bili: NEGATIVE / Urobili: NORMAL mg/dL   Blood: NEGATIVE / Protein: 30 mg/dL / Nitrite: NEGATIVE   Leuk Esterase: NEGATIVE / RBC: 0-2 / WBC 2-5   Sq Epi: OCC / Non Sq Epi: x / Bacteria: x    Xray Chest 1 View-PORTABLE IMMEDIATE (18 @ 12:29) >  IMPRESSION:  Small left pleural effusion with adjacent left basilar consolidative   changes including possibility of infiltrate/pneumonia in the proper   clinical context. Sharp right CP angle.  Right basilar scarring changes again noted. Clear mid and upper lungs. No   pneumothorax.   Stable cardiac and mediastinal silhouettes.  Trachea midline.  Generalized osteopenia, spinal degenerative changes with curvature, and   bilateral shoulder rotator cuff arthropathy again noted.    CT Chest No Cont (18 @ 14:15) >  FINDINGS:  AIRWAYS, LUNGS AND PLEURA: Patent central airways.   Interval improvement in degree of patchy consolidation within the right   lower and left lower lobes, left greater than right.  Trace left pleural   effusion. No pneumothorax.  Centrilobular emphysema.  Unchanged pulmonary nodules present, simple measurements include:  *  Noncalcified right upper lobe nodule measuring 1.2 cm (2, 80)  *  Noncalcified subpleural right upper lobe nodule measuring 6 mm (2, 22)    VESSELS: Thoracic aorta is normal in caliber. The aorta and coronary   arteries are calcified.    HEART: Cardiomegaly. There is no pericardial effusion.   MEDIASTINUM: Unchanged mediastinal lymphadenopathy compared to imaging   from 2018. Representative measurement includes a subaortic   lymph node measuring up to 1.2 cm in short axis (2, 46). No significant   hilar adenopathy is seen.     NECK ANDCHEST WALL: The visualized thyroid is homogeneous, with an   unchanged right thyroid lobe nodule measuring 8 mm.. There is no   significant supraclavicular or axillary lymphadenopathy.    UPPER ABDOMEN: Sludge within the gallbladder. Unchanged right renal upper   pole cyst measuring 3 cm. Unchanged nonspecific nodular thickening of the   bilateral adrenal glands.  BONES: Degenerative changes throughout the visualized vertebral spine.    IMPRESSION:  When compared with previous study of 2018:  Bibasilar consolidations are seen in a posterior and dependent location,   unchanged. However, there is now complete occlusion of the left lower   lobe bronchus which may be from secretions or mucous plugging.  Right middle lobe 1.2cm nodule is stable. Unchanged mediastinal   lymphadenopathy  Remaining incidental findings as above    Rapid Respiratory Viral Panel (12.02.15 @ 12:28)    Rapid RVP Result: Detected: The [employer] RVP Rapid uses polymerase chain reaction (PCR) and melt  curve analysis to screen for adenovirus; coronavirus HKU1, NL63, 229E,  OC43; human metapneumovirus (hMPV); human enterovirus/rhinovirus  (Entero/RV); influenza A; influenza A/H1;influenza A/H3; influenza  A/H1-2009; influenza B; parainfluenza viruses 1, 2, 3, 4; respiratory  syncytial virus; Bordetella pertussis; Mycoplasma pneumoniae; and  Chlamydophila pneumoniae.    Parainfluenza 4 (RapRVP): Detected: TYPE:(C=Critical, N=Notification, A=Abnormal) C  TESTS: _PV4  DATE/TIME CALLED: _12/02/15 14:18  CALLED TO: AYESHA DILL  READ BACK (2 Patient Identifiers)(Y/N): _Y  READ BACK VALUES (Y/N): _Y  CALLED BY: JEROME MCKEON

## 2018-01-31 NOTE — H&P ADULT - NSHPPHYSICALEXAM_GEN_ALL_CORE
GENERAL: NAD, elderly  HEAD:  Atraumatic, Normocephalic  EYES: EOMI, conjunctiva and sclera clear  NECK: Supple, No JVD  CHEST/LUNG: Crackles b.l   HEART: Regular rate and rhythm; normal S1 S2,  No murmurs, rubs, or gallops  ABDOMEN: Soft, Nontender, Nondistended; Bowel sounds normal  EXTREMITIES:  2+ Peripheral Pulses, +2 edema in b/l LE's erythematous and tender to palpation.  PSYCH: AAOx3, No acute distress   NEUROLOGY: no- focal deficits   SKIN: No rashes or lesions

## 2018-01-31 NOTE — ED PROVIDER NOTE - OBJECTIVE STATEMENT
Pt is 78 y/o female with PMhx of COPD, DM, HTN, HLD, CKD, chronic  venous insufficiency here for eval of fever, worsening cough and generalized weakness x 2 days. Pt was recently discharged from [facility] (Jan 24-Jan 27th), discharged with Augmentin for pneumonia - pt states she has been compliant with meds (has one dose left) _ " I am not getting better, I am getting worse as a matter of fact", oral temp of 100.9 noted yesterday. Pt had neg BC as well as neg RVP upon last admit, on Ct chest from Jan 25th showed "worsening bilateral lobe opacities" - concern for pneumonia vs neoplasm. Pt c/o non-productive cough and SOB (at baseline secondary to copd), poor po intake and generalized malaise. (+) diarrhea today (has been taking Colace), denies abd pain, nausea, vomiting or urinary sx.

## 2018-02-01 DIAGNOSIS — R93.8 ABNORMAL FINDINGS ON DIAGNOSTIC IMAGING OF OTHER SPECIFIED BODY STRUCTURES: ICD-10-CM

## 2018-02-01 LAB
ALBUMIN SERPL ELPH-MCNC: 2.6 G/DL — LOW (ref 3.3–5)
ALP SERPL-CCNC: 93 U/L — SIGNIFICANT CHANGE UP (ref 40–120)
ALT FLD-CCNC: 9 U/L — SIGNIFICANT CHANGE UP (ref 4–33)
AST SERPL-CCNC: 19 U/L — SIGNIFICANT CHANGE UP (ref 4–32)
BASOPHILS # BLD AUTO: 0.01 K/UL — SIGNIFICANT CHANGE UP (ref 0–0.2)
BASOPHILS NFR BLD AUTO: 0.4 % — SIGNIFICANT CHANGE UP (ref 0–2)
BILIRUB SERPL-MCNC: 0.2 MG/DL — SIGNIFICANT CHANGE UP (ref 0.2–1.2)
BUN SERPL-MCNC: 14 MG/DL — SIGNIFICANT CHANGE UP (ref 7–23)
CALCIUM SERPL-MCNC: 8.2 MG/DL — LOW (ref 8.4–10.5)
CHLORIDE SERPL-SCNC: 104 MMOL/L — SIGNIFICANT CHANGE UP (ref 98–107)
CO2 SERPL-SCNC: 25 MMOL/L — SIGNIFICANT CHANGE UP (ref 22–31)
CREAT SERPL-MCNC: 0.85 MG/DL — SIGNIFICANT CHANGE UP (ref 0.5–1.3)
EOSINOPHIL # BLD AUTO: 0.09 K/UL — SIGNIFICANT CHANGE UP (ref 0–0.5)
EOSINOPHIL NFR BLD AUTO: 3.4 % — SIGNIFICANT CHANGE UP (ref 0–6)
GLUCOSE SERPL-MCNC: 79 MG/DL — SIGNIFICANT CHANGE UP (ref 70–99)
HCT VFR BLD CALC: 32.4 % — LOW (ref 34.5–45)
HGB BLD-MCNC: 10.2 G/DL — LOW (ref 11.5–15.5)
IMM GRANULOCYTES # BLD AUTO: 0.02 # — SIGNIFICANT CHANGE UP
IMM GRANULOCYTES NFR BLD AUTO: 0.7 % — SIGNIFICANT CHANGE UP (ref 0–1.5)
LYMPHOCYTES # BLD AUTO: 1.02 K/UL — SIGNIFICANT CHANGE UP (ref 1–3.3)
LYMPHOCYTES # BLD AUTO: 38.2 % — SIGNIFICANT CHANGE UP (ref 13–44)
MAGNESIUM SERPL-MCNC: 1.9 MG/DL — SIGNIFICANT CHANGE UP (ref 1.6–2.6)
MCHC RBC-ENTMCNC: 28.3 PG — SIGNIFICANT CHANGE UP (ref 27–34)
MCHC RBC-ENTMCNC: 31.5 % — LOW (ref 32–36)
MCV RBC AUTO: 90 FL — SIGNIFICANT CHANGE UP (ref 80–100)
MONOCYTES # BLD AUTO: 0.27 K/UL — SIGNIFICANT CHANGE UP (ref 0–0.9)
MONOCYTES NFR BLD AUTO: 10.1 % — SIGNIFICANT CHANGE UP (ref 2–14)
NEUTROPHILS # BLD AUTO: 1.26 K/UL — LOW (ref 1.8–7.4)
NEUTROPHILS NFR BLD AUTO: 47.2 % — SIGNIFICANT CHANGE UP (ref 43–77)
NRBC # FLD: 0 — SIGNIFICANT CHANGE UP
PHOSPHATE SERPL-MCNC: 3.3 MG/DL — SIGNIFICANT CHANGE UP (ref 2.5–4.5)
PLATELET # BLD AUTO: 118 K/UL — LOW (ref 150–400)
PMV BLD: 11.4 FL — SIGNIFICANT CHANGE UP (ref 7–13)
POTASSIUM SERPL-MCNC: 3.9 MMOL/L — SIGNIFICANT CHANGE UP (ref 3.5–5.3)
POTASSIUM SERPL-SCNC: 3.9 MMOL/L — SIGNIFICANT CHANGE UP (ref 3.5–5.3)
PROT SERPL-MCNC: 6.4 G/DL — SIGNIFICANT CHANGE UP (ref 6–8.3)
RBC # BLD: 3.6 M/UL — LOW (ref 3.8–5.2)
RBC # FLD: 14.6 % — HIGH (ref 10.3–14.5)
SODIUM SERPL-SCNC: 138 MMOL/L — SIGNIFICANT CHANGE UP (ref 135–145)
SPECIMEN SOURCE: SIGNIFICANT CHANGE UP
SPECIMEN SOURCE: SIGNIFICANT CHANGE UP
WBC # BLD: 2.67 K/UL — LOW (ref 3.8–10.5)
WBC # FLD AUTO: 2.67 K/UL — LOW (ref 3.8–10.5)

## 2018-02-01 PROCEDURE — 99233 SBSQ HOSP IP/OBS HIGH 50: CPT | Mod: GC

## 2018-02-01 RX ORDER — ACETAMINOPHEN 500 MG
650 TABLET ORAL EVERY 6 HOURS
Qty: 0 | Refills: 0 | Status: DISCONTINUED | OUTPATIENT
Start: 2018-02-01 | End: 2018-02-03

## 2018-02-01 RX ORDER — BENZOCAINE AND MENTHOL 5; 1 G/100ML; G/100ML
1 LIQUID ORAL
Qty: 0 | Refills: 0 | Status: DISCONTINUED | OUTPATIENT
Start: 2018-02-01 | End: 2018-02-03

## 2018-02-01 RX ORDER — URSODIOL 250 MG/1
300 TABLET, FILM COATED ORAL EVERY 12 HOURS
Qty: 0 | Refills: 0 | Status: DISCONTINUED | OUTPATIENT
Start: 2018-02-01 | End: 2018-02-03

## 2018-02-01 RX ADMIN — Medication 1 TABLET(S): at 14:28

## 2018-02-01 RX ADMIN — Medication 200 MILLIGRAM(S): at 16:16

## 2018-02-01 RX ADMIN — PANTOPRAZOLE SODIUM 40 MILLIGRAM(S): 20 TABLET, DELAYED RELEASE ORAL at 05:53

## 2018-02-01 RX ADMIN — Medication 1 TABLET(S): at 16:16

## 2018-02-01 RX ADMIN — Medication 1 TABLET(S): at 08:58

## 2018-02-01 RX ADMIN — GABAPENTIN 800 MILLIGRAM(S): 400 CAPSULE ORAL at 14:28

## 2018-02-01 RX ADMIN — BUDESONIDE AND FORMOTEROL FUMARATE DIHYDRATE 2 PUFF(S): 160; 4.5 AEROSOL RESPIRATORY (INHALATION) at 22:07

## 2018-02-01 RX ADMIN — Medication 1 TABLET(S): at 11:21

## 2018-02-01 RX ADMIN — ENOXAPARIN SODIUM 40 MILLIGRAM(S): 100 INJECTION SUBCUTANEOUS at 22:07

## 2018-02-01 RX ADMIN — Medication 1 TABLET(S): at 05:52

## 2018-02-01 RX ADMIN — URSODIOL 300 MILLIGRAM(S): 250 TABLET, FILM COATED ORAL at 23:49

## 2018-02-01 RX ADMIN — Medication 1 APPLICATION(S): at 05:52

## 2018-02-01 RX ADMIN — TIOTROPIUM BROMIDE 1 CAPSULE(S): 18 CAPSULE ORAL; RESPIRATORY (INHALATION) at 22:07

## 2018-02-01 RX ADMIN — GABAPENTIN 800 MILLIGRAM(S): 400 CAPSULE ORAL at 05:52

## 2018-02-01 RX ADMIN — ATORVASTATIN CALCIUM 20 MILLIGRAM(S): 80 TABLET, FILM COATED ORAL at 22:07

## 2018-02-01 RX ADMIN — BUDESONIDE AND FORMOTEROL FUMARATE DIHYDRATE 2 PUFF(S): 160; 4.5 AEROSOL RESPIRATORY (INHALATION) at 08:58

## 2018-02-01 RX ADMIN — Medication 100 MILLIGRAM(S): at 11:21

## 2018-02-01 RX ADMIN — Medication 75 MILLIGRAM(S): at 16:57

## 2018-02-01 RX ADMIN — Medication 1 APPLICATION(S): at 16:57

## 2018-02-01 RX ADMIN — Medication 1 TABLET(S): at 22:07

## 2018-02-01 RX ADMIN — GABAPENTIN 800 MILLIGRAM(S): 400 CAPSULE ORAL at 22:07

## 2018-02-01 RX ADMIN — Medication 30 MILLIGRAM(S): at 05:52

## 2018-02-01 NOTE — PROGRESS NOTE ADULT - PROBLEM SELECTOR PLAN 4
- continue with home budesonide/formoterol  - continue with home spiriva   - continue with NC 2L, O2 goal stat >90, will wean off as tolerated - continue with home budesonide/formoterol  - continue with home spiriva   - continue with NC 2L, O2 goal stat >90, uses O2 at home

## 2018-02-01 NOTE — PHYSICAL THERAPY INITIAL EVALUATION ADULT - GAIT DISTANCE, PT EVAL
bed to chair/(~ 3 feet) --> pt. did not feel she could ambulate any further, despite (+) support & encouragement

## 2018-02-01 NOTE — PROGRESS NOTE ADULT - PROBLEM SELECTOR PLAN 1
- tachypneic, febrile with RVP + influenza   - continue with IVF  - f/u blood cultures - tachypneic and febrile on admission, RVP + for influenza   - continue with IVF  - f/u blood cultures

## 2018-02-01 NOTE — PROGRESS NOTE ADULT - ATTENDING COMMENTS
#Sepsis on arrival for tacypnea and fever now resolved likely due to flu  #Influenza A c/w tamiflu  #LE chronic changes due to venous insufficiency: c/w wound care per last admit     PT recommends rehab, d/w SW to place #Sepsis on arrival for tacypnea and fever now resolved likely due to flu  #Influenza A c/w tamiflu  #Lung consolidations from last admit:  was largely asymptomatic but pulm rec 7 day course of Augmentin and OP f/u for interval imaging, CT done in ED w/ improvement, last day abx today, OP f/u w/ pulm in ~3-4wks   #LE chronic changes due to venous insufficiency: c/w wound care per last admit     PT recommends rehab, d/w SW to place

## 2018-02-01 NOTE — PROGRESS NOTE ADULT - PROBLEM SELECTOR PLAN 9
- continue with ppi - DVT prophylaxis- Lovenox 40mg daily    Brionna Tay M.D.   PGY-1 | Internal Medicine   [phone] | [id number]  After 7 pm covering pager [phone] - DVT prophylaxis- Lovenox 40mg daily    Dispo: physical therapy evaluation     Brionna Tay M.D.   PGY-1 | Internal Medicine   [phone] | [phone]  After 7 pm covering pager [phone]

## 2018-02-01 NOTE — PROGRESS NOTE ADULT - PROBLEM SELECTOR PLAN 10
- DVT prophylaxis- Lovenox 40mg daily    Brionna Tay M.D.   PGY-1 | Internal Medicine   [phone] | [phone]  After 7 pm covering pager [phone]

## 2018-02-01 NOTE — PHYSICAL THERAPY INITIAL EVALUATION ADULT - ADDITIONAL COMMENTS
Pt. also owns a Rollator but doesn't use it because it is "too heavy."  Home O2 used PRN.    Pt. left seated in recliner post-PT in NAD with all lines/tubes intact & table/TV/call bell within reach.  RAEGAN Vazquez I. aware.

## 2018-02-01 NOTE — PROGRESS NOTE ADULT - PROBLEM SELECTOR PLAN 2
- RVP + for Influenza A    - c/w tamiflu 30 mg BID 5 dayc course    - Continue with hydration - RVP + for Influenza A    - c/w tamiflu 30 mg BID 5 day course    - Continue with hydration

## 2018-02-01 NOTE — PHYSICAL THERAPY INITIAL EVALUATION ADULT - PERTINENT HX OF CURRENT PROBLEM, REHAB EVAL
Pt. is a 78 y/o female admitted to [facility] on 01/31/18 with a dx of PNA.  PT consult request secondary to fall.  Droplet Precautions --> Flu.  Recent discharge from [facility].  H/O COPD, home O2 used PRN.  CXR = possible PNA.

## 2018-02-01 NOTE — PHYSICAL THERAPY INITIAL EVALUATION ADULT - GAIT DEVIATIONS NOTED, PT EVAL
decreased reyes/difficulty progressing Right LE --> (+) shuffling noted/increased time in double stance

## 2018-02-01 NOTE — PROGRESS NOTE ADULT - SUBJECTIVE AND OBJECTIVE BOX
Patient is a 77y old  Female who presents with a chief complaint of       SUBJECTIVE / OVERNIGHT EVENTS: Patient had no acute events overnight. Patient seen and examined at bedside this morning.     ROS: [ - ] Fever [ - ] Chills [ - ] Nausea/Vomiting [ - ] Chest Pain [ - ] Shortness of breath     MEDICATIONS  (STANDING):  allopurinol 100 milliGRAM(s) Oral daily  amoxicillin  500 milliGRAM(s)/clavulanate 1 Tablet(s) Oral every 8 hours  atorvastatin 20 milliGRAM(s) Oral at bedtime  buDESOnide  80 MICROgram(s)/formoterol 4.5 MICROgram(s) Inhaler 2 Puff(s) Inhalation two times a day  clotrimazole 1% Cream 1 Application(s) Topical two times a day  enoxaparin Injectable 40 milliGRAM(s) SubCutaneous every 24 hours  furosemide    Tablet 40 milliGRAM(s) Oral daily  gabapentin 800 milliGRAM(s) Oral three times a day  lactobacillus acidophilus 1 Tablet(s) Oral three times a day with meals  oseltamivir 30 milliGRAM(s) Oral two times a day  pantoprazole    Tablet 40 milliGRAM(s) Oral before breakfast  tiotropium 18 MICROgram(s) Capsule 1 Capsule(s) Inhalation at bedtime    MEDICATIONS  (PRN):      Vital Signs Last 24 Hrs  T(C): 37.2 (2018 05:43), Max: 38.4 (2018 12:17)  T(F): 99 (2018 05:43), Max: 101.2 (2018 12:17)  HR: 77 (2018 05:43) (73 - 97)  BP: 128/62 (2018 05:43) (117/43 - 148/62)  BP(mean): --  RR: 18 (2018 05:43) (18 - 20)  SpO2: 95% (2018 05:43) (95% - 100%)  CAPILLARY BLOOD GLUCOSE        I&O's Summary      PHYSICAL EXAM  GENERAL: NAD, laying comfortably in bed   HEAD:  Atraumatic, Normocephalic  EYES: EOMI b/l, PERRLA b/l, conjunctiva and sclera clear  NECK: Supple, No JVD, No LAD   CHEST/LUNG: Ronchi upper lobe b/l  HEART: Regular rate and rhythm; S1 and S2 present, No murmurs, rubs, or gallops  ABDOMEN: Soft, Nontender, Nondistended; Bowel sounds present  EXTREMITIES:  2+ Peripheral Pulses, 2+ edema in b/l LE's, erythematous, breaks in skin, no discharge noted, consistent with chronic venous stasis changes, tender to palpation   NEURO: AAOx3, non-focal, 4/5 strength LE b/l, diminished because of pain      LABS:                        10.2   2.67  )-----------( 118      ( 2018 06:20 )             32.4     02-    138  |  104  |  14  ----------------------------<  79  3.9   |  25  |  0.85    Ca    8.2<L>      2018 06:20  Phos  3.3     02-  Mg     1.9     -    TPro  6.4  /  Alb  2.6<L>  /  TBili  0.2  /  DBili  x   /  AST  19  /  ALT  9   /  AlkPhos  93  02-          Urinalysis Basic - ( 2018 17:15 )    Color: YELLOW / Appearance: CLEAR / S.019 / pH: 6.0  Gluc: NEGATIVE / Ketone: NEGATIVE  / Bili: NEGATIVE / Urobili: NORMAL mg/dL   Blood: NEGATIVE / Protein: 30 mg/dL / Nitrite: NEGATIVE   Leuk Esterase: NEGATIVE / RBC: 0-2 / WBC 2-5   Sq Epi: OCC / Non Sq Epi: x / Bacteria: x          RADIOLOGY & ADDITIONAL TESTS:    Imaging Personally Reviewed:    < from: CT Chest No Cont (18 @ 14:15) >  When compared with previous study of 2018:    Bibasilar consolidations are seen in a posterior and dependent location,   unchanged. However, there is now complete occlusion of the left lower   lobe bronchus which may be from secretions or mucous plugging.    Right middle lobe 1.2cm nodule is stable. Unchanged mediastinal   lymphadenopathy    < end of copied text > Patient is a 77y old  Female who presents with a chief complaint of       SUBJECTIVE / OVERNIGHT EVENTS: Patient had no acute events overnight. Patient seen and examined at bedside this morning. Patient endorses that when she got home from the hospital, she continued to feel week. Patient currently denying shortness of breath, using 02 for comfort, has oxygen at home. Patient endorsing productive cough with green sputum. Patient also endorsing leg swelling and pain, similar to how she felt when she was in the hospital last time. Patient amenable to subacute rehab now if that is what physical therapy recommends.     ROS: [ - ] Fever [ - ] Chills [ - ] Nausea/Vomiting [ - ] Chest Pain      MEDICATIONS  (STANDING):  allopurinol 100 milliGRAM(s) Oral daily  amoxicillin  500 milliGRAM(s)/clavulanate 1 Tablet(s) Oral every 8 hours  atorvastatin 20 milliGRAM(s) Oral at bedtime  buDESOnide  80 MICROgram(s)/formoterol 4.5 MICROgram(s) Inhaler 2 Puff(s) Inhalation two times a day  clotrimazole 1% Cream 1 Application(s) Topical two times a day  enoxaparin Injectable 40 milliGRAM(s) SubCutaneous every 24 hours  furosemide    Tablet 40 milliGRAM(s) Oral daily  gabapentin 800 milliGRAM(s) Oral three times a day  lactobacillus acidophilus 1 Tablet(s) Oral three times a day with meals  oseltamivir 30 milliGRAM(s) Oral two times a day  pantoprazole    Tablet 40 milliGRAM(s) Oral before breakfast  tiotropium 18 MICROgram(s) Capsule 1 Capsule(s) Inhalation at bedtime    MEDICATIONS  (PRN):      Vital Signs Last 24 Hrs  T(C): 37.2 (2018 05:43), Max: 38.4 (2018 12:17)  T(F): 99 (2018 05:43), Max: 101.2 (2018 12:17)  HR: 77 (2018 05:43) (73 - 97)  BP: 128/62 (2018 05:43) (117/43 - 148/62)  BP(mean): --  RR: 18 (2018 05:43) (18 - 20)  SpO2: 95% (2018 05:43) (95% - 100%)  CAPILLARY BLOOD GLUCOSE        I&O's Summary      PHYSICAL EXAM  GENERAL: NAD, laying comfortably in bed, using 2L 02    HEAD:  Atraumatic, Normocephalic  EYES: EOMI b/l, PERRLA b/l, conjunctiva and sclera clear  NECK: Supple, No JVD, No LAD   CHEST/LUNG:  crackles at b/l bases   HEART: Regular rate and rhythm; S1 and S2 present, No murmurs, rubs, or gallops  ABDOMEN: Soft, Nontender, Nondistended; Bowel sounds present  EXTREMITIES:  2+ Peripheral Pulses, 2+ edema in b/l LE's, erythematous, breaks in skin, no discharge noted, consistent with chronic venous stasis changes   NEURO: AAOx3, non-focal, 4/5 strength LE b/l, diminished because of pain      LABS:                        10.2   2.67  )-----------( 118      ( 2018 06:20 )             32.4     02-01    138  |  104  |  14  ----------------------------<  79  3.9   |  25  |  0.85    Ca    8.2<L>      2018 06:20  Phos  3.3     02-01  Mg     1.9     02-01    TPro  6.4  /  Alb  2.6<L>  /  TBili  0.2  /  DBili  x   /  AST  19  /  ALT  9   /  AlkPhos  93  02-01          Urinalysis Basic - ( 2018 17:15 )    Color: YELLOW / Appearance: CLEAR / S.019 / pH: 6.0  Gluc: NEGATIVE / Ketone: NEGATIVE  / Bili: NEGATIVE / Urobili: NORMAL mg/dL   Blood: NEGATIVE / Protein: 30 mg/dL / Nitrite: NEGATIVE   Leuk Esterase: NEGATIVE / RBC: 0-2 / WBC 2-5   Sq Epi: OCC / Non Sq Epi: x / Bacteria: x          RADIOLOGY & ADDITIONAL TESTS:    Imaging Personally Reviewed:    < from: CT Chest No Cont (18 @ 14:15) >  When compared with previous study of 2018:    Bibasilar consolidations are seen in a posterior and dependent location,   unchanged. However, there is now complete occlusion of the left lower   lobe bronchus which may be from secretions or mucous plugging.    Right middle lobe 1.2cm nodule is stable. Unchanged mediastinal   lymphadenopathy    < end of copied text > Patient is a 77y old  Female who presents with a chief complaint of       SUBJECTIVE / OVERNIGHT EVENTS: Patient had no acute events overnight. Patient seen and examined at bedside this morning. Patient endorses that when she got home from the hospital, she continued to feel week. Patient currently denying shortness of breath, using 02 for comfort, has oxygen at home. Patient endorsing productive cough with green sputum. Patient also endorsing leg swelling and pain, similar to how she felt when she was in the hospital last time. Patient amenable to subacute rehab now if that is what physical therapy recommends.     ROS: [ - ] Fever [ - ] Chills [ - ] Nausea/Vomiting [ - ] Diarrhea [ - ] Chest Pain      MEDICATIONS  (STANDING):  allopurinol 100 milliGRAM(s) Oral daily  amoxicillin  500 milliGRAM(s)/clavulanate 1 Tablet(s) Oral every 8 hours  atorvastatin 20 milliGRAM(s) Oral at bedtime  buDESOnide  80 MICROgram(s)/formoterol 4.5 MICROgram(s) Inhaler 2 Puff(s) Inhalation two times a day  clotrimazole 1% Cream 1 Application(s) Topical two times a day  enoxaparin Injectable 40 milliGRAM(s) SubCutaneous every 24 hours  furosemide    Tablet 40 milliGRAM(s) Oral daily  gabapentin 800 milliGRAM(s) Oral three times a day  lactobacillus acidophilus 1 Tablet(s) Oral three times a day with meals  oseltamivir 30 milliGRAM(s) Oral two times a day  pantoprazole    Tablet 40 milliGRAM(s) Oral before breakfast  tiotropium 18 MICROgram(s) Capsule 1 Capsule(s) Inhalation at bedtime    MEDICATIONS  (PRN):      Vital Signs Last 24 Hrs  T(C): 37.2 (2018 05:43), Max: 38.4 (2018 12:17)  T(F): 99 (2018 05:43), Max: 101.2 (2018 12:17)  HR: 77 (2018 05:43) (73 - 97)  BP: 128/62 (2018 05:43) (117/43 - 148/62)  BP(mean): --  RR: 18 (2018 05:43) (18 - 20)  SpO2: 95% (2018 05:43) (95% - 100%)  CAPILLARY BLOOD GLUCOSE        I&O's Summary      PHYSICAL EXAM  GENERAL: NAD, laying comfortably in bed, using 2L 02    HEAD:  Atraumatic, Normocephalic  EYES: EOMI b/l, PERRLA b/l, conjunctiva and sclera clear  NECK: Supple, No JVD, No LAD   CHEST/LUNG:  crackles at b/l bases   HEART: Regular rate and rhythm; S1 and S2 present, No murmurs, rubs, or gallops  ABDOMEN: Soft, Nontender, Nondistended; Bowel sounds present  EXTREMITIES:  2+ Peripheral Pulses, 2+ edema in b/l LE's, erythematous, breaks in skin, no discharge noted, consistent with chronic venous stasis changes   NEURO: AAOx3, non-focal, 4/5 strength LE b/l, diminished because of pain      LABS:                        10.2   2.67  )-----------( 118      ( 2018 06:20 )             32.4     02-01    138  |  104  |  14  ----------------------------<  79  3.9   |  25  |  0.85    Ca    8.2<L>      2018 06:20  Phos  3.3     02-01  Mg     1.9     02-01    TPro  6.4  /  Alb  2.6<L>  /  TBili  0.2  /  DBili  x   /  AST  19  /  ALT  9   /  AlkPhos  93  02-01          Urinalysis Basic - ( 2018 17:15 )    Color: YELLOW / Appearance: CLEAR / S.019 / pH: 6.0  Gluc: NEGATIVE / Ketone: NEGATIVE  / Bili: NEGATIVE / Urobili: NORMAL mg/dL   Blood: NEGATIVE / Protein: 30 mg/dL / Nitrite: NEGATIVE   Leuk Esterase: NEGATIVE / RBC: 0-2 / WBC 2-5   Sq Epi: OCC / Non Sq Epi: x / Bacteria: x          RADIOLOGY & ADDITIONAL TESTS:    Imaging Personally Reviewed:    < from: CT Chest No Cont (18 @ 14:15) >  When compared with previous study of 2018:    Bibasilar consolidations are seen in a posterior and dependent location,   unchanged. However, there is now complete occlusion of the left lower   lobe bronchus which may be from secretions or mucous plugging.    Right middle lobe 1.2cm nodule is stable. Unchanged mediastinal   lymphadenopathy    < end of copied text > Patient is a 77y old  Female who presents with a chief complaint of cough/fever     SUBJECTIVE / OVERNIGHT EVENTS: Patient had no acute events overnight. Patient seen and examined at bedside this morning. Patient endorses that when she got home from the hospital, she continued to feel week. Patient currently denying shortness of breath, using 02 for comfort, has oxygen at home. Patient endorsing productive cough with green sputum. Patient also endorsing leg swelling and pain, similar to how she felt when she was in the hospital last time. Patient amenable to subacute rehab now if that is what physical therapy recommends.     ROS: [ - ] Fever [ - ] Chills [ - ] Nausea/Vomiting [ - ] Diarrhea [ - ] Chest Pain      MEDICATIONS  (STANDING):  allopurinol 100 milliGRAM(s) Oral daily  amoxicillin  500 milliGRAM(s)/clavulanate 1 Tablet(s) Oral every 8 hours  atorvastatin 20 milliGRAM(s) Oral at bedtime  buDESOnide  80 MICROgram(s)/formoterol 4.5 MICROgram(s) Inhaler 2 Puff(s) Inhalation two times a day  clotrimazole 1% Cream 1 Application(s) Topical two times a day  enoxaparin Injectable 40 milliGRAM(s) SubCutaneous every 24 hours  furosemide    Tablet 40 milliGRAM(s) Oral daily  gabapentin 800 milliGRAM(s) Oral three times a day  lactobacillus acidophilus 1 Tablet(s) Oral three times a day with meals  oseltamivir 30 milliGRAM(s) Oral two times a day  pantoprazole    Tablet 40 milliGRAM(s) Oral before breakfast  tiotropium 18 MICROgram(s) Capsule 1 Capsule(s) Inhalation at bedtime    Vital Signs Last 24 Hrs  T(C): 37.2 (2018 05:43), Max: 38.4 (2018 12:17)  T(F): 99 (2018 05:43), Max: 101.2 (2018 12:17)  HR: 77 (2018 05:43) (73 - 97)  BP: 128/62 (2018 05:43) (117/43 - 148/62)  BP(mean): --  RR: 18 (2018 05:43) (18 - 20)  SpO2: 95% (2018 05:43) (95% - 100%)  CAPILLARY BLOOD GLUCOSE    PHYSICAL EXAM  GENERAL: NAD, laying comfortably in bed, using 2L 02    HEAD:  Atraumatic, Normocephalic  EYES: EOMI b/l, PERRLA b/l, conjunctiva and sclera clear  NECK: Supple, No JVD, No LAD   CHEST/LUNG:  crackles at b/l bases   HEART: Regular rate and rhythm; S1 and S2 present, No murmurs, rubs, or gallops  ABDOMEN: Soft, Nontender, Nondistended; Bowel sounds present  EXTREMITIES:  2+ Peripheral Pulses, 2+ edema in b/l LE's, erythematous, breaks in skin, no discharge noted, consistent with chronic venous stasis changes   NEURO: AAOx3, non-focal, 4/5 strength LE b/l, diminished because of pain    LABS:                        10.2   2.67  )-----------( 118      ( 2018 06:20 )             32.4     02-01    138  |  104  |  14  ----------------------------<  79  3.9   |  25  |  0.85    Ca    8.2<L>      2018 06:20  Phos  3.3     02-01  Mg     1.9     02-01    TPro  6.4  /  Alb  2.6<L>  /  TBili  0.2  /  DBili  x   /  AST  19  /  ALT  9   /  AlkPhos  93  02-      Urinalysis Basic - ( 2018 17:15 )    Color: YELLOW / Appearance: CLEAR / S.019 / pH: 6.0  Gluc: NEGATIVE / Ketone: NEGATIVE  / Bili: NEGATIVE / Urobili: NORMAL mg/dL   Blood: NEGATIVE / Protein: 30 mg/dL / Nitrite: NEGATIVE   Leuk Esterase: NEGATIVE / RBC: 0-2 / WBC 2-5   Sq Epi: OCC / Non Sq Epi: x / Bacteria: x      RADIOLOGY & ADDITIONAL TESTS:    Imaging Personally Reviewed:    < from: CT Chest No Cont (18 @ 14:15) >  When compared with previous study of 2018:    Bibasilar consolidations are seen in a posterior and dependent location,   unchanged. However, there is now complete occlusion of the left lower   lobe bronchus which may be from secretions or mucous plugging.    Right middle lobe 1.2cm nodule is stable. Unchanged mediastinal   lymphadenopathy    < end of copied text >

## 2018-02-01 NOTE — PHYSICAL THERAPY INITIAL EVALUATION ADULT - PRECAUTIONS/LIMITATIONS, REHAB EVAL
(via N/C PRN), (Droplet --> Flu)/isolation precautions/oxygen therapy device and L/min/fall precautions

## 2018-02-01 NOTE — PROGRESS NOTE ADULT - PROBLEM SELECTOR PLAN 7
-c/w furosemide  -CTM for high blood pressure - patient with watery diarrhea at home, likely due to laxatives   - holding laxatives   - continue to monitor, also may be due to antibiotic use  - Will add lactobacillus. - patient with watery diarrhea at home, likely due to laxatives, resolved    - holding laxatives   - continue to monitor, also may be due to antibiotic use, Will add lactobacillus.

## 2018-02-01 NOTE — PHYSICAL THERAPY INITIAL EVALUATION ADULT - CRITERIA FOR SKILLED THERAPEUTIC INTERVENTIONS
Inpatient restorative rehab/risk reduction/prevention/predicted duration of therapy intervention/therapy frequency/impairments found/rehab potential/anticipated discharge recommendation

## 2018-02-01 NOTE — PROGRESS NOTE ADULT - PROBLEM SELECTOR PLAN 5
- Lipodermatosclerosis secondary to chronic venous insufficiency  - No drainage appreciated.   - c/w furosemide and gabapentin   - c/w clotrimazole cream for toes. - Lipodermatosclerosis secondary to chronic venous insufficiency  - No drainage appreciated   - c/w furosemide and gabapentin   - c/w clotrimazole cream for toes - Lipodermatosclerosis secondary to chronic venous insufficiency  - No drainage appreciated   - c/w furosemide and gabapentin   - c/w clotrimazole cream for toes  - Physical Therapy order placed

## 2018-02-01 NOTE — PROGRESS NOTE ADULT - PROBLEM SELECTOR PLAN 3
- CT chest with Bibasilar consolidations are seen in a posterior and dependent location, unchanged. However, there is now complete occlusion of the left lower   lobe bronchus which may be from secretions or mucous plugging.  -Patient had been started on 7 day course of Augmentin during last admission, will finish course    -Follows with pulmonologist Dr. Boyle - CT chest with Bibasilar consolidations are seen in a posterior and dependent location, unchanged. However, there is now complete occlusion of the left lower   lobe bronchus which may be from secretions or mucous plugging.  -Patient had been started on 7 day course of Augmentin per Pulmonologist , Dr. Boyle during last admission, will finish 7 day course

## 2018-02-01 NOTE — PHYSICAL THERAPY INITIAL EVALUATION ADULT - PATIENT PROFILE REVIEW, REHAB EVAL
yes/ACTIVITY: ambulate with assistance; consult with George Oquendo RN --> pt. OK for PT as tolerated

## 2018-02-01 NOTE — PROGRESS NOTE ADULT - PROBLEM SELECTOR PLAN 8
- patient with watery diarrhea at home, likely due to laxatives   - holding laxatives   - continue to monitor, also may be due to antibiotic use. Will add lactobacillus. - continue with ppi

## 2018-02-02 ENCOUNTER — TRANSCRIPTION ENCOUNTER (OUTPATIENT)
Age: 78
End: 2018-02-02

## 2018-02-02 LAB
BUN SERPL-MCNC: 16 MG/DL — SIGNIFICANT CHANGE UP (ref 7–23)
CALCIUM SERPL-MCNC: 7.7 MG/DL — LOW (ref 8.4–10.5)
CHLORIDE SERPL-SCNC: 106 MMOL/L — SIGNIFICANT CHANGE UP (ref 98–107)
CO2 SERPL-SCNC: 23 MMOL/L — SIGNIFICANT CHANGE UP (ref 22–31)
CREAT SERPL-MCNC: 0.94 MG/DL — SIGNIFICANT CHANGE UP (ref 0.5–1.3)
GLUCOSE SERPL-MCNC: 83 MG/DL — SIGNIFICANT CHANGE UP (ref 70–99)
HCT VFR BLD CALC: 31.4 % — LOW (ref 34.5–45)
HGB BLD-MCNC: 9.9 G/DL — LOW (ref 11.5–15.5)
MAGNESIUM SERPL-MCNC: 1.8 MG/DL — SIGNIFICANT CHANGE UP (ref 1.6–2.6)
MCHC RBC-ENTMCNC: 28.2 PG — SIGNIFICANT CHANGE UP (ref 27–34)
MCHC RBC-ENTMCNC: 31.5 % — LOW (ref 32–36)
MCV RBC AUTO: 89.5 FL — SIGNIFICANT CHANGE UP (ref 80–100)
NRBC # FLD: 0 — SIGNIFICANT CHANGE UP
PHOSPHATE SERPL-MCNC: 2.6 MG/DL — SIGNIFICANT CHANGE UP (ref 2.5–4.5)
PLATELET # BLD AUTO: 122 K/UL — LOW (ref 150–400)
PMV BLD: 11.1 FL — SIGNIFICANT CHANGE UP (ref 7–13)
POTASSIUM SERPL-MCNC: 4 MMOL/L — SIGNIFICANT CHANGE UP (ref 3.5–5.3)
POTASSIUM SERPL-SCNC: 4 MMOL/L — SIGNIFICANT CHANGE UP (ref 3.5–5.3)
RBC # BLD: 3.51 M/UL — LOW (ref 3.8–5.2)
RBC # FLD: 14.4 % — SIGNIFICANT CHANGE UP (ref 10.3–14.5)
SODIUM SERPL-SCNC: 140 MMOL/L — SIGNIFICANT CHANGE UP (ref 135–145)
SPECIMEN SOURCE: SIGNIFICANT CHANGE UP
WBC # BLD: 3.83 K/UL — SIGNIFICANT CHANGE UP (ref 3.8–10.5)
WBC # FLD AUTO: 3.83 K/UL — SIGNIFICANT CHANGE UP (ref 3.8–10.5)

## 2018-02-02 PROCEDURE — 99232 SBSQ HOSP IP/OBS MODERATE 35: CPT | Mod: GC

## 2018-02-02 RX ORDER — ENOXAPARIN SODIUM 100 MG/ML
40 INJECTION SUBCUTANEOUS
Qty: 0 | Refills: 0 | COMMUNITY
Start: 2018-02-02

## 2018-02-02 RX ORDER — DORNASE ALFA 1 MG/ML
2.5 SOLUTION RESPIRATORY (INHALATION) ONCE
Qty: 0 | Refills: 0 | Status: DISCONTINUED | OUTPATIENT
Start: 2018-02-02 | End: 2018-02-03

## 2018-02-02 RX ORDER — ATORVASTATIN CALCIUM 80 MG/1
1 TABLET, FILM COATED ORAL
Qty: 0 | Refills: 0 | COMMUNITY
Start: 2018-02-02

## 2018-02-02 RX ORDER — ALLOPURINOL 300 MG
1 TABLET ORAL
Qty: 0 | Refills: 0 | COMMUNITY
Start: 2018-02-02

## 2018-02-02 RX ORDER — PANTOPRAZOLE SODIUM 20 MG/1
1 TABLET, DELAYED RELEASE ORAL
Qty: 0 | Refills: 0 | COMMUNITY
Start: 2018-02-02

## 2018-02-02 RX ORDER — TIOTROPIUM BROMIDE 18 UG/1
1 CAPSULE ORAL; RESPIRATORY (INHALATION)
Qty: 0 | Refills: 0 | COMMUNITY
Start: 2018-02-02

## 2018-02-02 RX ADMIN — URSODIOL 300 MILLIGRAM(S): 250 TABLET, FILM COATED ORAL at 08:24

## 2018-02-02 RX ADMIN — BUDESONIDE AND FORMOTEROL FUMARATE DIHYDRATE 2 PUFF(S): 160; 4.5 AEROSOL RESPIRATORY (INHALATION) at 22:52

## 2018-02-02 RX ADMIN — GABAPENTIN 800 MILLIGRAM(S): 400 CAPSULE ORAL at 13:15

## 2018-02-02 RX ADMIN — TIOTROPIUM BROMIDE 1 CAPSULE(S): 18 CAPSULE ORAL; RESPIRATORY (INHALATION) at 22:53

## 2018-02-02 RX ADMIN — ENOXAPARIN SODIUM 40 MILLIGRAM(S): 100 INJECTION SUBCUTANEOUS at 22:52

## 2018-02-02 RX ADMIN — Medication 100 MILLIGRAM(S): at 13:15

## 2018-02-02 RX ADMIN — PANTOPRAZOLE SODIUM 40 MILLIGRAM(S): 20 TABLET, DELAYED RELEASE ORAL at 06:11

## 2018-02-02 RX ADMIN — Medication 1 TABLET(S): at 17:10

## 2018-02-02 RX ADMIN — Medication 1 TABLET(S): at 08:24

## 2018-02-02 RX ADMIN — Medication 1 TABLET(S): at 11:02

## 2018-02-02 RX ADMIN — ATORVASTATIN CALCIUM 20 MILLIGRAM(S): 80 TABLET, FILM COATED ORAL at 22:52

## 2018-02-02 RX ADMIN — Medication 200 MILLIGRAM(S): at 12:25

## 2018-02-02 RX ADMIN — GABAPENTIN 800 MILLIGRAM(S): 400 CAPSULE ORAL at 06:12

## 2018-02-02 RX ADMIN — Medication 40 MILLIGRAM(S): at 06:12

## 2018-02-02 RX ADMIN — Medication 75 MILLIGRAM(S): at 06:12

## 2018-02-02 RX ADMIN — Medication 1 APPLICATION(S): at 06:11

## 2018-02-02 RX ADMIN — Medication 75 MILLIGRAM(S): at 17:10

## 2018-02-02 RX ADMIN — URSODIOL 300 MILLIGRAM(S): 250 TABLET, FILM COATED ORAL at 17:10

## 2018-02-02 RX ADMIN — BUDESONIDE AND FORMOTEROL FUMARATE DIHYDRATE 2 PUFF(S): 160; 4.5 AEROSOL RESPIRATORY (INHALATION) at 09:30

## 2018-02-02 RX ADMIN — GABAPENTIN 800 MILLIGRAM(S): 400 CAPSULE ORAL at 22:52

## 2018-02-02 NOTE — PROGRESS NOTE ADULT - PROBLEM SELECTOR PLAN 1
- tachypneic and febrile on admission, RVP + for influenza   - c/w Tamiflu 5 day course   - f/u blood cultures, NGTD

## 2018-02-02 NOTE — DISCHARGE NOTE ADULT - PATIENT PORTAL LINK FT
“You can access the [facility] Patient Portal, offered by [facility], by registering with the following website: [email or web address]”

## 2018-02-02 NOTE — PROGRESS NOTE ADULT - PROBLEM SELECTOR PLAN 4
- continue with home budesonide/formoterol  - continue with home spiriva   - continue with NC 2L, O2 goal stat >90, uses O2 at home - continue with home budesonide/formoterol  - continue with home spiriva   - patient uses O2 at home, says it makes it more comfortable to have it on

## 2018-02-02 NOTE — PROGRESS NOTE ADULT - SUBJECTIVE AND OBJECTIVE BOX
Patient is a 77y old  Female who presents with a chief complaint of       SUBJECTIVE / OVERNIGHT EVENTS: Patient had no acute events overnight. Patient seen and examined at bedside this morning.     ROS: [ - ] Fever [ - ] Chills [ - ] Nausea/Vomiting [ - ] Chest Pain [ - ] Shortness of breath     MEDICATIONS  (STANDING):  allopurinol 100 milliGRAM(s) Oral daily  atorvastatin 20 milliGRAM(s) Oral at bedtime  buDESOnide  80 MICROgram(s)/formoterol 4.5 MICROgram(s) Inhaler 2 Puff(s) Inhalation two times a day  clotrimazole 1% Cream 1 Application(s) Topical two times a day  enoxaparin Injectable 40 milliGRAM(s) SubCutaneous every 24 hours  furosemide    Tablet 40 milliGRAM(s) Oral daily  gabapentin 800 milliGRAM(s) Oral three times a day  lactobacillus acidophilus 1 Tablet(s) Oral three times a day with meals  oseltamivir 75 milliGRAM(s) Oral two times a day  pantoprazole    Tablet 40 milliGRAM(s) Oral before breakfast  tiotropium 18 MICROgram(s) Capsule 1 Capsule(s) Inhalation at bedtime  ursodiol Capsule 300 milliGRAM(s) Oral every 12 hours    MEDICATIONS  (PRN):  acetaminophen   Tablet 650 milliGRAM(s) Oral every 6 hours PRN For Temp greater than 38 C (100.4 F)  acetaminophen   Tablet. 650 milliGRAM(s) Oral every 6 hours PRN Mild Pain (1 - 3)  benzocaine 15 mG/menthol 3.6 mG Lozenge 1 Lozenge Oral every 2 hours PRN Sore Throat  guaiFENesin    Syrup 200 milliGRAM(s) Oral every 6 hours PRN Cough      Vital Signs Last 24 Hrs  T(C): 36.7 (2018 06:24), Max: 36.8 (2018 14:05)  T(F): 98.1 (2018 06:24), Max: 98.2 (2018 14:05)  HR: 72 (2018 06:24) (68 - 72)  BP: 139/78 (2018 06:24) (129/60 - 149/80)  BP(mean): --  RR: 18 (2018 06:24) (18 - 18)  SpO2: 99% (2018 06:24) (97% - 99%)  CAPILLARY BLOOD GLUCOSE        I&O's Summary      PHYSICAL EXAM  GENERAL: NAD, laying comfortably in bed, using 2L 02    HEAD:  Atraumatic, Normocephalic  EYES: EOMI b/l, PERRLA b/l, conjunctiva and sclera clear  NECK: Supple, No JVD, No LAD   CHEST/LUNG: Crackles at b/l bases   HEART: Regular rate and rhythm; S1 and S2 present, No murmurs, rubs, or gallops  ABDOMEN: Soft, Nontender, Nondistended; Bowel sounds present  EXTREMITIES:  2+ Peripheral Pulses, 2+ edema in b/l LE's, erythematous, breaks in skin, no discharge noted, consistent with chronic venous stasis changes   NEURO: AAOx3, non-focal, 4/5 strength LE b/l, diminished because of pain      LABS:                        9.9    3.83  )-----------( 122      ( 2018 05:30 )             31.4     02-02    140  |  106  |  16  ----------------------------<  83  4.0   |  23  |  0.94    Ca    7.7<L>      2018 05:30  Phos  2.6     02-02  Mg     1.8     02-02    TPro  6.4  /  Alb  2.6<L>  /  TBili  0.2  /  DBili  x   /  AST  19  /  ALT  9   /  AlkPhos  93  02-01          Urinalysis Basic - ( 2018 17:15 )    Color: YELLOW / Appearance: CLEAR / S.019 / pH: 6.0  Gluc: NEGATIVE / Ketone: NEGATIVE  / Bili: NEGATIVE / Urobili: NORMAL mg/dL   Blood: NEGATIVE / Protein: 30 mg/dL / Nitrite: NEGATIVE   Leuk Esterase: NEGATIVE / RBC: 0-2 / WBC 2-5   Sq Epi: OCC / Non Sq Epi: x / Bacteria: x Patient is a 77y old  Female who presents with a chief complaint of       SUBJECTIVE / OVERNIGHT EVENTS: Patient had no acute events overnight. Patient seen and examined at bedside this morning. Patient endorsing feeling a lot better this morning. Patient said that her son gave the  the list of rehab places she wants to go to.     ROS: [ - ] Fever [ - ] Chills [ - ] Nausea/Vomiting [ - ] Chest Pain [ - ] Shortness of breath     MEDICATIONS  (STANDING):  allopurinol 100 milliGRAM(s) Oral daily  atorvastatin 20 milliGRAM(s) Oral at bedtime  buDESOnide  80 MICROgram(s)/formoterol 4.5 MICROgram(s) Inhaler 2 Puff(s) Inhalation two times a day  clotrimazole 1% Cream 1 Application(s) Topical two times a day  enoxaparin Injectable 40 milliGRAM(s) SubCutaneous every 24 hours  furosemide    Tablet 40 milliGRAM(s) Oral daily  gabapentin 800 milliGRAM(s) Oral three times a day  lactobacillus acidophilus 1 Tablet(s) Oral three times a day with meals  oseltamivir 75 milliGRAM(s) Oral two times a day  pantoprazole    Tablet 40 milliGRAM(s) Oral before breakfast  tiotropium 18 MICROgram(s) Capsule 1 Capsule(s) Inhalation at bedtime  ursodiol Capsule 300 milliGRAM(s) Oral every 12 hours    MEDICATIONS  (PRN):  acetaminophen   Tablet 650 milliGRAM(s) Oral every 6 hours PRN For Temp greater than 38 C (100.4 F)  acetaminophen   Tablet. 650 milliGRAM(s) Oral every 6 hours PRN Mild Pain (1 - 3)  benzocaine 15 mG/menthol 3.6 mG Lozenge 1 Lozenge Oral every 2 hours PRN Sore Throat  guaiFENesin    Syrup 200 milliGRAM(s) Oral every 6 hours PRN Cough      Vital Signs Last 24 Hrs  T(C): 36.7 (2018 06:24), Max: 36.8 (2018 14:05)  T(F): 98.1 (2018 06:24), Max: 98.2 (2018 14:05)  HR: 72 (2018 06:24) (68 - 72)  BP: 139/78 (2018 06:24) (129/60 - 149/80)  BP(mean): --  RR: 18 (2018 06:24) (18 - 18)  SpO2: 99% (2018 06:24) (97% - 99%)  CAPILLARY BLOOD GLUCOSE        I&O's Summary      PHYSICAL EXAM  GENERAL: NAD, laying comfortably in bed, using 2L 02    HEAD:  Atraumatic, Normocephalic  EYES: EOMI b/l, PERRLA b/l, conjunctiva and sclera clear  NECK: Supple, No JVD, No LAD   CHEST/LUNG: Crackles at b/l bases   HEART: Regular rate and rhythm; S1 and S2 present, No murmurs, rubs, or gallops  ABDOMEN: Soft, Nontender, Nondistended; Bowel sounds present  EXTREMITIES:  2+ Peripheral Pulses, 2+ edema in b/l LE's, erythematous, breaks in skin, no discharge noted, consistent with chronic venous stasis changes   NEURO: AAOx3, non-focal, 4/5 strength LE b/l    LABS:                        9.9    3.83  )-----------( 122      ( 2018 05:30 )             31.4     02-02    140  |  106  |  16  ----------------------------<  83  4.0   |  23  |  0.94    Ca    7.7<L>      2018 05:30  Phos  2.6     02-02  Mg     1.8     02-02    TPro  6.4  /  Alb  2.6<L>  /  TBili  0.2  /  DBili  x   /  AST  19  /  ALT  9   /  AlkPhos  93  02-01          Urinalysis Basic - ( 2018 17:15 )    Color: YELLOW / Appearance: CLEAR / S.019 / pH: 6.0  Gluc: NEGATIVE / Ketone: NEGATIVE  / Bili: NEGATIVE / Urobili: NORMAL mg/dL   Blood: NEGATIVE / Protein: 30 mg/dL / Nitrite: NEGATIVE   Leuk Esterase: NEGATIVE / RBC: 0-2 / WBC 2-5   Sq Epi: OCC / Non Sq Epi: x / Bacteria: x

## 2018-02-02 NOTE — DISCHARGE NOTE ADULT - HOSPITAL COURSE
Hospital Course: In the ED, T 98.5 HR 74, /31, R18 saturating 98% on NC 2L. Ct scan with bibasilar consolidations in a posterior and dependent location, unchanged from previous CT one week prior. Patient was Influenza A positive, and was started on Tamiflu. Patient finished her last day of the Augmentin 7 day course that was started as per her Pulmonologist, Dr. Boyle, during last admission one week prior. Patient was seen by physical therapy while she was in the hospital, who recommended restorative rehab.   Patient was stable for discharge and was discharged to a restorative rehab facility. Patient will finish 5 day course of Tamiflu. Patient will follow up with her PCP and/or Pulmonologist, Dr. Boyle, within 1 week of discharge to monitor her symptoms.   Patient should also follow up with the [facility] ([phone], [address] [place] [address]) or primary care MD for wound care/wrapping of lower legs.      Wound Care Instructions:   B/L LE: Cleanse with soap and water, pat dry. Apply Liquid barrier film to periwound skin of open ulcers. Cover with foam without border. Apply Sween 24 moisturizing lotion to dry/scaly skin of lower legs. Wrap with kerlix, and ACE bandage. Change daily while in hospital. Change very 72 hours at home.  B/L buttock and posterior thighs: Cleanse with skin cleanser, pat dry. Apply Antifungal barrier cream twice a day.   Continue low air loss bed therapy, continue to turn & reposition q2h, continue moisture management with antifungal barrier creams & single breathable pad, continue measures to decrease friction/shear/pressure.

## 2018-02-02 NOTE — DISCHARGE NOTE ADULT - CARE PLAN
Principal Discharge DX:	Influenza A  Goal:	5 day course of Tamiflu 75 mg twice daily  Assessment and plan of treatment:	You came into the hospital because of increased weakness, shortness of breath and cough. You were found to be influenza A positive and started on a 5 day course of Tamiflu. You also finished your 7 day course of Augmentin that you were prescribed during your recent admission to the hospital. You will need to follow up with your PCP and/or Pulmonologist, Dr. Boyle, within 1 week of discharge to monitor your symptoms.  Secondary Diagnosis:	Bilateral leg edema  Assessment and plan of treatment:	You should follow up with the wound care instruction listed below. You should follow up with the [facility] ([phone], [address] [place] [address]) or primary care MD for wound care/wrapping of lower legs.    Wound Care Instructions:   B/L LE: Cleanse with soap and water, pat dry. Apply Liquid barrier film to periwound skin of open ulcers. Cover with foam without border. Apply Sween 24 moisturizing lotion to dry/scaly skin of lower legs. Wrap with kerlix, and ACE bandage. Change daily while in hospital. Change very 72 hours at home.    B/L buttock and posterior thighs: Cleanse with skin cleanser, pat dry. Apply Antifungal barrier cream twice a day.    Continue low air loss bed therapy, continue to turn & reposition q2h, continue moisture management with antifungal barrier creams & single breathable pad, continue measures to decrease friction/shear/pressure. Principal Discharge DX:	Influenza A  Goal:	5 day course of Tamiflu 75 mg twice daily  Assessment and plan of treatment:	You came into the hospital because of increased weakness, shortness of breath and cough. You were found to be influenza A positive and started on a 5 day course of Tamiflu (until February 6th). You also finished your 7 day course of Augmentin that you were prescribed during your recent admission to the hospital. You will need to follow up with your PCP and/or Pulmonologist, Dr. Boyle, within 1 week of discharge to monitor your symptoms.  Secondary Diagnosis:	Bilateral leg edema  Assessment and plan of treatment:	You should follow up with the wound care instruction listed below. You should follow up with the [facility] ([phone], [address] [place] [address]) or primary care MD for wound care/wrapping of lower legs.    Wound Care Instructions:   B/L LE: Cleanse with soap and water, pat dry. Apply Liquid barrier film to periwound skin of open ulcers. Cover with foam without border. Apply Sween 24 moisturizing lotion to dry/scaly skin of lower legs. Wrap with kerlix, and ACE bandage. Change daily while in hospital. Change very 72 hours at home.    B/L buttock and posterior thighs: Cleanse with skin cleanser, pat dry. Apply Antifungal barrier cream twice a day.    Continue low air loss bed therapy, continue to turn & reposition q2h, continue moisture management with antifungal barrier creams & single breathable pad, continue measures to decrease friction/shear/pressure.

## 2018-02-02 NOTE — PROGRESS NOTE ADULT - PROBLEM SELECTOR PLAN 9
- DVT prophylaxis- Lovenox 40mg daily    Dispo: physical therapy recommends SIRI Tay M.D.   PGY-1 | Internal Medicine   [phone] | [id number]  After 7 pm covering pager [phone]

## 2018-02-02 NOTE — PROGRESS NOTE ADULT - PROBLEM SELECTOR PLAN 7
Resolved  - patient with watery diarrhea at home, likely due to laxatives, resolved    - holding laxatives, c/w lactobacillus

## 2018-02-02 NOTE — DISCHARGE NOTE ADULT - MEDICATION SUMMARY - MEDICATIONS TO TAKE
I will START or STAY ON the medications listed below when I get home from the hospital:    traMADol 50 mg oral tablet  -- 1 tab(s) by mouth 2 times a day, As Needed  -- Indication: For Pain    enoxaparin  -- 40 milligram(s) subcutaneous once a day  -- Indication: For DVT prophylaxis     gabapentin 800 mg oral tablet  -- 1 tab(s) by mouth 3 times a day  -- Indication: For Neuropathy    allopurinol 100 mg oral tablet  -- 1 tab(s) by mouth once a day  -- Indication: For LE edema     atorvastatin 20 mg oral tablet  -- 1 tab(s) by mouth once a day (at bedtime)  -- Indication: For Hyperlipidemia    oseltamivir 75 mg oral capsule  -- 1 cap(s) by mouth 2 times a day  -- Indication: For Influenza A    tiotropium 18 mcg inhalation capsule  -- 1 cap(s) inhaled once a day (at bedtime)  -- Indication: For COPD (chronic obstructive pulmonary disease)    ProAir HFA 90 mcg/inh inhalation aerosol  -- 2 puff(s) inhaled 4 times a day, As Needed  -- Indication: For COPD (chronic obstructive pulmonary disease)    budesonide-formoterol 80 mcg-4.5 mcg/inh inhalation aerosol  -- 2 puff(s) inhaled 2 times a day   -- Indication: For COPD (chronic obstructive pulmonary disease)    clotrimazole 1% topical cream  -- 1 application on skin 2 times a day  -- Indication: For RASH    furosemide 40 mg oral tablet  -- 1 tab(s) by mouth once a day  -- Indication: For LE edema     guaiFENesin 100 mg/5 mL oral liquid  -- 10 milliliter(s) by mouth every 6 hours, As needed, Cough  -- Indication: For COugh    ursodiol 300 mg oral capsule  -- 1 cap(s) by mouth 3 times a day  -- Indication: For elevated alk phos    potassium chloride 20 mEq oral tablet, extended release  -- 1 tab(s) by mouth once a day, As Needed  -- Indication: For Hypokalemia     pantoprazole 40 mg oral delayed release tablet  -- 1 tab(s) by mouth once a day (before a meal)  -- Indication: For GERD (gastroesophageal reflux disease)

## 2018-02-02 NOTE — DISCHARGE NOTE ADULT - CARE PROVIDER_API CALL
Dr. Boyle,   [address], [place], [place] [address] [phone]  Phone: (   )    -  Fax: (   )    -

## 2018-02-02 NOTE — PROGRESS NOTE ADULT - PROBLEM SELECTOR PLAN 3
- CT chest with Bibasilar consolidations are seen in a posterior and dependent location, unchanged. However, there is now complete occlusion of the left lower   lobe bronchus which may be from secretions or mucous plugging.  -Patient s/p 7 day course of Augmentin per Pulmonologist , Dr. Boyle during last admission

## 2018-02-02 NOTE — PROGRESS NOTE ADULT - PROBLEM SELECTOR PLAN 5
- Lipodermatosclerosis secondary to chronic venous insufficiency  - No drainage appreciated   - c/w furosemide and gabapentin   - c/w clotrimazole cream for toes  - Physical Therapy recommends subacute rehab

## 2018-02-02 NOTE — PROGRESS NOTE ADULT - ATTENDING COMMENTS
#Influenza A c/w tamiflu  #Lung consolidations from last admit:  was largely asymptomatic but pulm rec 7 day course of Augmentin which she completed and OP f/u for interval imaging, CT done in ED w/ improvement, OP f/u w/ pulm in ~3-4wks (Dr. Larson)   #LE chronic changes due to venous insufficiency: c/w wound care per last admit     PT recommends rehab, pending

## 2018-02-02 NOTE — DISCHARGE NOTE ADULT - COMMUNITY RESOURCES
[facility]  [address] [place] [address]  [phone]  [facility]  [phone]

## 2018-02-03 VITALS
TEMPERATURE: 98 F | HEART RATE: 85 BPM | OXYGEN SATURATION: 97 % | DIASTOLIC BLOOD PRESSURE: 61 MMHG | SYSTOLIC BLOOD PRESSURE: 144 MMHG | RESPIRATION RATE: 20 BRPM

## 2018-02-03 LAB
-  AMPICILLIN: SIGNIFICANT CHANGE UP
-  CIPROFLOXACIN: SIGNIFICANT CHANGE UP
-  NITROFURANTOIN: SIGNIFICANT CHANGE UP
-  TETRACYCLINE: SIGNIFICANT CHANGE UP
-  VANCOMYCIN: SIGNIFICANT CHANGE UP
BACTERIA UR CULT: SIGNIFICANT CHANGE UP
METHOD TYPE: SIGNIFICANT CHANGE UP
ORGANISM # SPEC MICROSCOPIC CNT: SIGNIFICANT CHANGE UP
ORGANISM # SPEC MICROSCOPIC CNT: SIGNIFICANT CHANGE UP

## 2018-02-03 PROCEDURE — 99239 HOSP IP/OBS DSCHRG MGMT >30: CPT

## 2018-02-03 RX ADMIN — Medication 1 APPLICATION(S): at 05:23

## 2018-02-03 RX ADMIN — Medication 40 MILLIGRAM(S): at 05:23

## 2018-02-03 RX ADMIN — Medication 1 TABLET(S): at 12:45

## 2018-02-03 RX ADMIN — Medication 1 TABLET(S): at 08:36

## 2018-02-03 RX ADMIN — URSODIOL 300 MILLIGRAM(S): 250 TABLET, FILM COATED ORAL at 05:24

## 2018-02-03 RX ADMIN — Medication 75 MILLIGRAM(S): at 05:23

## 2018-02-03 RX ADMIN — Medication 200 MILLIGRAM(S): at 14:34

## 2018-02-03 RX ADMIN — Medication 100 MILLIGRAM(S): at 12:45

## 2018-02-03 RX ADMIN — BUDESONIDE AND FORMOTEROL FUMARATE DIHYDRATE 2 PUFF(S): 160; 4.5 AEROSOL RESPIRATORY (INHALATION) at 08:36

## 2018-02-03 RX ADMIN — GABAPENTIN 800 MILLIGRAM(S): 400 CAPSULE ORAL at 14:34

## 2018-02-03 RX ADMIN — GABAPENTIN 800 MILLIGRAM(S): 400 CAPSULE ORAL at 05:23

## 2018-02-03 RX ADMIN — PANTOPRAZOLE SODIUM 40 MILLIGRAM(S): 20 TABLET, DELAYED RELEASE ORAL at 05:24

## 2018-02-03 NOTE — PROGRESS NOTE ADULT - ASSESSMENT
77 year old female with PMHx significant for HLD, COPD, GERD,  Lipodermatosclerosis secondary to chronic venous insufficiency presenting with worsening cough and SOB, found to be Influenza A positive.
77 year old female with PMHx significant for HLD, COPD, GERD,  Lipodermatosclerosis secondary to chronic venous insufficiency presenting with worsening cough and SOB, found to be Influenza A positive. Now pending SIRI placement.
77 year old female with PMHx significant for HLD, COPD, GERD,  Lipodermatosclerosis secondary to chronic venous insufficiency presenting with worsening cough and SOB, found to be Influenza A positive. Now pending SIRI placement.

## 2018-02-03 NOTE — PROGRESS NOTE ADULT - PROBLEM SELECTOR PLAN 6
- C/w atorvastatin 20mg PO daily
- C/w atorvastatin 20mg PO daily
- No pravastatin in formulary.   - C/w atorvastatin 20mg PO daily

## 2018-02-03 NOTE — PROGRESS NOTE ADULT - SUBJECTIVE AND OBJECTIVE BOX
Patient is a 77y old  Female who presents with a chief complaint of cough and weakness, Influenza positive (02 Feb 2018 13:43)        SUBJECTIVE / OVERNIGHT EVENTS: Patient had no acute events overnight. Patient seen and examined at bedside this morning.     ROS: [ - ] Fever [ - ] Chills [ - ] Nausea/Vomiting [ - ] Chest Pain [ - ] Shortness of breath     MEDICATIONS  (STANDING):  allopurinol 100 milliGRAM(s) Oral daily  atorvastatin 20 milliGRAM(s) Oral at bedtime  buDESOnide  80 MICROgram(s)/formoterol 4.5 MICROgram(s) Inhaler 2 Puff(s) Inhalation two times a day  clotrimazole 1% Cream 1 Application(s) Topical two times a day  dornase naila Solution 2.5 milliGRAM(s) Inhalation once  enoxaparin Injectable 40 milliGRAM(s) SubCutaneous every 24 hours  furosemide    Tablet 40 milliGRAM(s) Oral daily  gabapentin 800 milliGRAM(s) Oral three times a day  lactobacillus acidophilus 1 Tablet(s) Oral three times a day with meals  oseltamivir 75 milliGRAM(s) Oral two times a day  pantoprazole    Tablet 40 milliGRAM(s) Oral before breakfast  tiotropium 18 MICROgram(s) Capsule 1 Capsule(s) Inhalation at bedtime  ursodiol Capsule 300 milliGRAM(s) Oral every 12 hours    MEDICATIONS  (PRN):  acetaminophen   Tablet 650 milliGRAM(s) Oral every 6 hours PRN For Temp greater than 38 C (100.4 F)  acetaminophen   Tablet. 650 milliGRAM(s) Oral every 6 hours PRN Mild Pain (1 - 3)  benzocaine 15 mG/menthol 3.6 mG Lozenge 1 Lozenge Oral every 2 hours PRN Sore Throat  guaiFENesin    Syrup 200 milliGRAM(s) Oral every 6 hours PRN Cough      Vital Signs Last 24 Hrs  T(C): 36.6 (03 Feb 2018 06:05), Max: 37.1 (02 Feb 2018 14:02)  T(F): 97.9 (03 Feb 2018 06:05), Max: 98.7 (02 Feb 2018 14:02)  HR: 76 (03 Feb 2018 06:05) (76 - 84)  BP: 129/68 (03 Feb 2018 06:05) (129/68 - 132/71)  BP(mean): --  RR: 18 (03 Feb 2018 06:05) (18 - 18)  SpO2: 99% (03 Feb 2018 06:05) (96% - 99%)  CAPILLARY BLOOD GLUCOSE        I&O's Summary      PHYSICAL EXAM  GENERAL: NAD, laying comfortably in bed, using 2L 02    HEAD:  Atraumatic, Normocephalic  EYES: EOMI b/l, PERRLA b/l, conjunctiva and sclera clear  NECK: Supple, No JVD, No LAD   CHEST/LUNG: Crackles at b/l bases   HEART: Regular rate and rhythm; S1 and S2 present, No murmurs, rubs, or gallops  ABDOMEN: Soft, Nontender, Nondistended; Bowel sounds present  EXTREMITIES:  2+ Peripheral Pulses, 2+ edema in b/l LE's, erythematous, breaks in skin, no discharge noted, consistent with chronic venous stasis changes   NEURO: AAOx3, non-focal, 4/5 strength LE b/l    LABS:                        9.9    3.83  )-----------( 122      ( 02 Feb 2018 05:30 )             31.4     02-02    140  |  106  |  16  ----------------------------<  83  4.0   |  23  |  0.94    Ca    7.7<L>      02 Feb 2018 05:30  Phos  2.6     02-02  Mg     1.8     02-02 Patient is a 77y old  Female who presents with a chief complaint of cough and weakness, Influenza positive (02 Feb 2018 13:43)        SUBJECTIVE / OVERNIGHT EVENTS: Patient had no acute events overnight. Patient seen and examined at bedside this morning. Patient with no complaints this morning.     ROS: [ - ] Fever [ - ] Chills [ - ] Nausea/Vomiting [ - ] Chest Pain [ - ] Shortness of breath [ + ] cough     MEDICATIONS  (STANDING):  allopurinol 100 milliGRAM(s) Oral daily  atorvastatin 20 milliGRAM(s) Oral at bedtime  buDESOnide  80 MICROgram(s)/formoterol 4.5 MICROgram(s) Inhaler 2 Puff(s) Inhalation two times a day  clotrimazole 1% Cream 1 Application(s) Topical two times a day  dornase naila Solution 2.5 milliGRAM(s) Inhalation once  enoxaparin Injectable 40 milliGRAM(s) SubCutaneous every 24 hours  furosemide    Tablet 40 milliGRAM(s) Oral daily  gabapentin 800 milliGRAM(s) Oral three times a day  lactobacillus acidophilus 1 Tablet(s) Oral three times a day with meals  oseltamivir 75 milliGRAM(s) Oral two times a day  pantoprazole    Tablet 40 milliGRAM(s) Oral before breakfast  tiotropium 18 MICROgram(s) Capsule 1 Capsule(s) Inhalation at bedtime  ursodiol Capsule 300 milliGRAM(s) Oral every 12 hours    MEDICATIONS  (PRN):  acetaminophen   Tablet 650 milliGRAM(s) Oral every 6 hours PRN For Temp greater than 38 C (100.4 F)  acetaminophen   Tablet. 650 milliGRAM(s) Oral every 6 hours PRN Mild Pain (1 - 3)  benzocaine 15 mG/menthol 3.6 mG Lozenge 1 Lozenge Oral every 2 hours PRN Sore Throat  guaiFENesin    Syrup 200 milliGRAM(s) Oral every 6 hours PRN Cough      Vital Signs Last 24 Hrs  T(C): 36.6 (03 Feb 2018 06:05), Max: 37.1 (02 Feb 2018 14:02)  T(F): 97.9 (03 Feb 2018 06:05), Max: 98.7 (02 Feb 2018 14:02)  HR: 76 (03 Feb 2018 06:05) (76 - 84)  BP: 129/68 (03 Feb 2018 06:05) (129/68 - 132/71)  BP(mean): --  RR: 18 (03 Feb 2018 06:05) (18 - 18)  SpO2: 99% (03 Feb 2018 06:05) (96% - 99%)  CAPILLARY BLOOD GLUCOSE        I&O's Summary      PHYSICAL EXAM  GENERAL: NAD, laying comfortably in bed, using 2L 02    HEAD:  Atraumatic, Normocephalic  EYES: EOMI b/l, PERRLA b/l, conjunctiva and sclera clear  NECK: Supple, No JVD, No LAD   CHEST/LUNG: upper lobe ronchi   HEART: Regular rate and rhythm; S1 and S2 present, No murmurs, rubs, or gallops  ABDOMEN: Soft, Nontender, Nondistended; Bowel sounds present  EXTREMITIES:  2+ Peripheral Pulses, 2+ edema in b/l LE's, erythematous, breaks in skin, no discharge noted, consistent with chronic venous stasis changes   NEURO: AAOx3, non-focal, 4/5 strength LE b/l    LABS:                        9.9    3.83  )-----------( 122      ( 02 Feb 2018 05:30 )             31.4     02-02    140  |  106  |  16  ----------------------------<  83  4.0   |  23  |  0.94    Ca    7.7<L>      02 Feb 2018 05:30  Phos  2.6     02-02  Mg     1.8     02-02

## 2018-02-03 NOTE — PROGRESS NOTE ADULT - PROBLEM SELECTOR PLAN 9
- DVT prophylaxis- Lovenox 40mg daily    Dispo: physical therapy recommends SIRI, will go to rehab today      Brionna Tay M.D.   PGY-1 | Internal Medicine   [phone] | [phone]  After 7 pm covering pager [phone]

## 2018-02-03 NOTE — PROGRESS NOTE ADULT - PROBLEM SELECTOR PLAN 4
- continue with home budesonide/formoterol  - continue with home spiriva   - patient uses O2 at home, says it makes it more comfortable to have it on

## 2018-02-03 NOTE — PROGRESS NOTE ADULT - ATTENDING COMMENTS
Patient seen and examined. Agree with above note by resident.    DC to rehab today. Time spent>30 mins

## 2018-02-05 LAB
BACTERIA BLD CULT: SIGNIFICANT CHANGE UP
BACTERIA BLD CULT: SIGNIFICANT CHANGE UP

## 2018-03-31 ENCOUNTER — APPOINTMENT (OUTPATIENT)
Dept: CT IMAGING | Facility: CLINIC | Age: 78
End: 2018-03-31
Payer: MEDICARE

## 2018-03-31 ENCOUNTER — OUTPATIENT (OUTPATIENT)
Dept: OUTPATIENT SERVICES | Facility: HOSPITAL | Age: 78
LOS: 1 days | End: 2018-03-31
Payer: MEDICARE

## 2018-03-31 DIAGNOSIS — Z00.00 ENCOUNTER FOR GENERAL ADULT MEDICAL EXAMINATION WITHOUT ABNORMAL FINDINGS: ICD-10-CM

## 2018-03-31 PROCEDURE — 71250 CT THORAX DX C-: CPT | Mod: 26

## 2018-03-31 PROCEDURE — 71250 CT THORAX DX C-: CPT

## 2018-06-18 ENCOUNTER — INPATIENT (INPATIENT)
Facility: HOSPITAL | Age: 78
LOS: 7 days | Discharge: NURSING, REHAB OR LONG-TERM CARE | End: 2018-06-26
Attending: INTERNAL MEDICINE | Admitting: INTERNAL MEDICINE
Payer: MEDICARE

## 2018-06-18 VITALS
DIASTOLIC BLOOD PRESSURE: 86 MMHG | OXYGEN SATURATION: 85 % | SYSTOLIC BLOOD PRESSURE: 162 MMHG | HEART RATE: 113 BPM | TEMPERATURE: 100 F | RESPIRATION RATE: 18 BRPM

## 2018-06-18 DIAGNOSIS — L03.90 CELLULITIS, UNSPECIFIED: ICD-10-CM

## 2018-06-18 LAB
ALBUMIN SERPL ELPH-MCNC: 3.1 G/DL — LOW (ref 3.3–5)
ALP SERPL-CCNC: 164 U/L — HIGH (ref 40–120)
ALT FLD-CCNC: 9 U/L — SIGNIFICANT CHANGE UP (ref 4–33)
AST SERPL-CCNC: 28 U/L — SIGNIFICANT CHANGE UP (ref 4–32)
BASE EXCESS BLDV CALC-SCNC: 2.6 MMOL/L — SIGNIFICANT CHANGE UP
BILIRUB SERPL-MCNC: 0.7 MG/DL — SIGNIFICANT CHANGE UP (ref 0.2–1.2)
BLOOD GAS VENOUS - CREATININE: 1.08 MG/DL — SIGNIFICANT CHANGE UP (ref 0.5–1.3)
BUN SERPL-MCNC: 24 MG/DL — HIGH (ref 7–23)
CALCIUM SERPL-MCNC: 8.8 MG/DL — SIGNIFICANT CHANGE UP (ref 8.4–10.5)
CHLORIDE BLDV-SCNC: 106 MMOL/L — SIGNIFICANT CHANGE UP (ref 96–108)
CHLORIDE SERPL-SCNC: 101 MMOL/L — SIGNIFICANT CHANGE UP (ref 98–107)
CO2 SERPL-SCNC: 23 MMOL/L — SIGNIFICANT CHANGE UP (ref 22–31)
CREAT SERPL-MCNC: 1.07 MG/DL — SIGNIFICANT CHANGE UP (ref 0.5–1.3)
GAS PNL BLDV: 134 MMOL/L — LOW (ref 136–146)
GLUCOSE BLDV-MCNC: 118 — HIGH (ref 70–99)
GLUCOSE SERPL-MCNC: 116 MG/DL — HIGH (ref 70–99)
HCO3 BLDV-SCNC: 26 MMOL/L — SIGNIFICANT CHANGE UP (ref 20–27)
HCT VFR BLD CALC: 35.9 % — SIGNIFICANT CHANGE UP (ref 34.5–45)
HCT VFR BLDV CALC: 28.3 % — LOW (ref 34.5–45)
HGB BLD-MCNC: 11.3 G/DL — LOW (ref 11.5–15.5)
HGB BLDV-MCNC: 9.1 G/DL — LOW (ref 11.5–15.5)
LACTATE BLDV-MCNC: 2.2 MMOL/L — HIGH (ref 0.5–2)
MCHC RBC-ENTMCNC: 28.2 PG — SIGNIFICANT CHANGE UP (ref 27–34)
MCHC RBC-ENTMCNC: 31.5 % — LOW (ref 32–36)
MCV RBC AUTO: 89.5 FL — SIGNIFICANT CHANGE UP (ref 80–100)
NRBC # FLD: 0 — SIGNIFICANT CHANGE UP
PCO2 BLDV: 37 MMHG — LOW (ref 41–51)
PH BLDV: 7.46 PH — HIGH (ref 7.32–7.43)
PLATELET # BLD AUTO: 144 K/UL — LOW (ref 150–400)
PMV BLD: 11.6 FL — SIGNIFICANT CHANGE UP (ref 7–13)
PO2 BLDV: 29 MMHG — LOW (ref 35–40)
POTASSIUM BLDV-SCNC: 4.2 MMOL/L — SIGNIFICANT CHANGE UP (ref 3.4–4.5)
POTASSIUM SERPL-MCNC: 4.5 MMOL/L — SIGNIFICANT CHANGE UP (ref 3.5–5.3)
POTASSIUM SERPL-SCNC: 4.5 MMOL/L — SIGNIFICANT CHANGE UP (ref 3.5–5.3)
PROT SERPL-MCNC: 8 G/DL — SIGNIFICANT CHANGE UP (ref 6–8.3)
RBC # BLD: 4.01 M/UL — SIGNIFICANT CHANGE UP (ref 3.8–5.2)
RBC # FLD: 16.2 % — HIGH (ref 10.3–14.5)
SAO2 % BLDV: 51.5 % — LOW (ref 60–85)
SODIUM SERPL-SCNC: 136 MMOL/L — SIGNIFICANT CHANGE UP (ref 135–145)
WBC # BLD: 10.91 K/UL — HIGH (ref 3.8–10.5)
WBC # FLD AUTO: 10.91 K/UL — HIGH (ref 3.8–10.5)

## 2018-06-18 PROCEDURE — 71045 X-RAY EXAM CHEST 1 VIEW: CPT | Mod: 26

## 2018-06-18 RX ORDER — PIPERACILLIN AND TAZOBACTAM 4; .5 G/20ML; G/20ML
3.38 INJECTION, POWDER, LYOPHILIZED, FOR SOLUTION INTRAVENOUS ONCE
Qty: 0 | Refills: 0 | Status: COMPLETED | OUTPATIENT
Start: 2018-06-18 | End: 2018-06-18

## 2018-06-18 RX ORDER — ACETAMINOPHEN 500 MG
650 TABLET ORAL ONCE
Qty: 0 | Refills: 0 | Status: COMPLETED | OUTPATIENT
Start: 2018-06-18 | End: 2018-06-18

## 2018-06-18 RX ORDER — VANCOMYCIN HCL 1 G
1000 VIAL (EA) INTRAVENOUS ONCE
Qty: 0 | Refills: 0 | Status: COMPLETED | OUTPATIENT
Start: 2018-06-18 | End: 2018-06-18

## 2018-06-18 RX ADMIN — Medication 650 MILLIGRAM(S): at 23:20

## 2018-06-18 RX ADMIN — Medication 250 MILLIGRAM(S): at 23:18

## 2018-06-18 RX ADMIN — PIPERACILLIN AND TAZOBACTAM 200 GRAM(S): 4; .5 INJECTION, POWDER, LYOPHILIZED, FOR SOLUTION INTRAVENOUS at 21:30

## 2018-06-18 NOTE — ED ADULT NURSE REASSESSMENT NOTE - NS ED NURSE REASSESS COMMENT FT1
Urine obtained via straight cath. pt cleaned, clean linen and diaper applied. Repositioned for comfort. lower extremities elevated. Pt has blanchable redness to bilateral buttock and upper thigh.

## 2018-06-18 NOTE — ED PROVIDER NOTE - OBJECTIVE STATEMENT
77F with hx of chronic venous insufficiency, COPD on intermittent O2, HLD presenting with worsening LE swelling and fall. Per pt, was trying to get out of her lift chair and couldn't catch her balance and ended up sliding down to the floor, states no LOC, remembers the event, no preceding dizziness, no prodromal symptoms, denies headstrike. Pt also c/o worsening b/l LE swelling x 2 weeks and in the past few days has noticed leaking from the L leg; also endorsing subjective fevers and chills, no worsening pain in LE, per son possibly more erythematous. Follows with Dr Milagros Hernandez for vascular, but has not seen her in many months, had f/u in one week. Denies HA, CP, SOB,  or GI symptoms.

## 2018-06-18 NOTE — ED PROVIDER NOTE - MEDICAL DECISION MAKING DETAILS
77F with chronic b/l LE swelling with worsening fall today, also with worsening swelling and new leaking ulcer on LLE, +subjective fevers. Will check basic labs, VBG, rectal temp, sepsis w/u

## 2018-06-18 NOTE — ED ADULT NURSE REASSESSMENT NOTE - NS ED NURSE REASSESS COMMENT FT1
Pt refuses to change into gown. Pt is a&ox3, respirations even and unlabored, in NAD. 22G placed to L forearm by RAEGAN james.

## 2018-06-18 NOTE — ED PROVIDER NOTE - ATTENDING CONTRIBUTION TO CARE
reena: hx includes previous episodes of LE cellulitis. Pt notes two weeks increasing swelling both legs.   feverish sensation 2 days. ulcer to back of left leg within past 2 weeks.   exam: febrile. bilateral leg swelling with redness and warmth. large superficial ulcer posterior left leg (distal portion). exam otherwise unremarkable.  impression: cellulitis most likely cause of fever. will still check ua and cxr.   recc: broad spectrum antibiotics after blood cultures; fluids.

## 2018-06-18 NOTE — ED PROVIDER NOTE - SKIN COLOR
b/l LE with erythema from ankles to calves; RLE dry, no leaking, +swelling +pain on palpation; LLE with large 4x3cm open wound on posterior aspect, +tenderness on palpation

## 2018-06-19 ENCOUNTER — TRANSCRIPTION ENCOUNTER (OUTPATIENT)
Age: 78
End: 2018-06-19

## 2018-06-19 DIAGNOSIS — I87.2 VENOUS INSUFFICIENCY (CHRONIC) (PERIPHERAL): ICD-10-CM

## 2018-06-19 DIAGNOSIS — Z29.9 ENCOUNTER FOR PROPHYLACTIC MEASURES, UNSPECIFIED: ICD-10-CM

## 2018-06-19 DIAGNOSIS — J44.9 CHRONIC OBSTRUCTIVE PULMONARY DISEASE, UNSPECIFIED: ICD-10-CM

## 2018-06-19 LAB
APPEARANCE UR: CLEAR — SIGNIFICANT CHANGE UP
APTT BLD: 25.4 SEC — LOW (ref 27.5–37.4)
BASOPHILS # BLD AUTO: 0.03 K/UL — SIGNIFICANT CHANGE UP (ref 0–0.2)
BASOPHILS NFR BLD AUTO: 0.3 % — SIGNIFICANT CHANGE UP (ref 0–2)
BILIRUB UR-MCNC: NEGATIVE — SIGNIFICANT CHANGE UP
BLOOD UR QL VISUAL: NEGATIVE — SIGNIFICANT CHANGE UP
BUN SERPL-MCNC: 21 MG/DL — SIGNIFICANT CHANGE UP (ref 7–23)
CALCIUM SERPL-MCNC: 8.7 MG/DL — SIGNIFICANT CHANGE UP (ref 8.4–10.5)
CHLORIDE SERPL-SCNC: 101 MMOL/L — SIGNIFICANT CHANGE UP (ref 98–107)
CO2 SERPL-SCNC: 18 MMOL/L — LOW (ref 22–31)
COLOR SPEC: SIGNIFICANT CHANGE UP
CREAT SERPL-MCNC: 1.07 MG/DL — SIGNIFICANT CHANGE UP (ref 0.5–1.3)
EOSINOPHIL # BLD AUTO: 0.01 K/UL — SIGNIFICANT CHANGE UP (ref 0–0.5)
EOSINOPHIL NFR BLD AUTO: 0.1 % — SIGNIFICANT CHANGE UP (ref 0–6)
GLUCOSE SERPL-MCNC: 99 MG/DL — SIGNIFICANT CHANGE UP (ref 70–99)
GLUCOSE UR-MCNC: NEGATIVE — SIGNIFICANT CHANGE UP
HCT VFR BLD CALC: 36.3 % — SIGNIFICANT CHANGE UP (ref 34.5–45)
HGB BLD-MCNC: 11.6 G/DL — SIGNIFICANT CHANGE UP (ref 11.5–15.5)
IMM GRANULOCYTES # BLD AUTO: 0.04 # — SIGNIFICANT CHANGE UP
IMM GRANULOCYTES NFR BLD AUTO: 0.4 % — SIGNIFICANT CHANGE UP (ref 0–1.5)
INR BLD: 1.02 — SIGNIFICANT CHANGE UP (ref 0.88–1.17)
KETONES UR-MCNC: NEGATIVE — SIGNIFICANT CHANGE UP
LEUKOCYTE ESTERASE UR-ACNC: NEGATIVE — SIGNIFICANT CHANGE UP
LYMPHOCYTES # BLD AUTO: 1.07 K/UL — SIGNIFICANT CHANGE UP (ref 1–3.3)
LYMPHOCYTES # BLD AUTO: 9.9 % — LOW (ref 13–44)
MCHC RBC-ENTMCNC: 28.5 PG — SIGNIFICANT CHANGE UP (ref 27–34)
MCHC RBC-ENTMCNC: 32 % — SIGNIFICANT CHANGE UP (ref 32–36)
MCV RBC AUTO: 89.2 FL — SIGNIFICANT CHANGE UP (ref 80–100)
MONOCYTES # BLD AUTO: 0.46 K/UL — SIGNIFICANT CHANGE UP (ref 0–0.9)
MONOCYTES NFR BLD AUTO: 4.3 % — SIGNIFICANT CHANGE UP (ref 2–14)
MUCOUS THREADS # UR AUTO: SIGNIFICANT CHANGE UP
NEUTROPHILS # BLD AUTO: 9.17 K/UL — HIGH (ref 1.8–7.4)
NEUTROPHILS NFR BLD AUTO: 85 % — HIGH (ref 43–77)
NITRITE UR-MCNC: NEGATIVE — SIGNIFICANT CHANGE UP
NRBC # FLD: 0 — SIGNIFICANT CHANGE UP
PH UR: 7.5 — SIGNIFICANT CHANGE UP (ref 4.6–8)
PLATELET # BLD AUTO: 126 K/UL — LOW (ref 150–400)
PMV BLD: 11.6 FL — SIGNIFICANT CHANGE UP (ref 7–13)
POTASSIUM SERPL-MCNC: 4.3 MMOL/L — SIGNIFICANT CHANGE UP (ref 3.5–5.3)
POTASSIUM SERPL-SCNC: 4.3 MMOL/L — SIGNIFICANT CHANGE UP (ref 3.5–5.3)
PROT UR-MCNC: 30 MG/DL — HIGH
PROTHROM AB SERPL-ACNC: 11.3 SEC — SIGNIFICANT CHANGE UP (ref 9.8–13.1)
RBC # BLD: 4.07 M/UL — SIGNIFICANT CHANGE UP (ref 3.8–5.2)
RBC # FLD: 16.3 % — HIGH (ref 10.3–14.5)
RBC CASTS # UR COMP ASSIST: SIGNIFICANT CHANGE UP (ref 0–?)
SODIUM SERPL-SCNC: 134 MMOL/L — LOW (ref 135–145)
SP GR SPEC: 1.01 — SIGNIFICANT CHANGE UP (ref 1–1.04)
SPECIMEN SOURCE: SIGNIFICANT CHANGE UP
SPECIMEN SOURCE: SIGNIFICANT CHANGE UP
UROBILINOGEN FLD QL: NORMAL MG/DL — SIGNIFICANT CHANGE UP
WBC # BLD: 10.78 K/UL — HIGH (ref 3.8–10.5)
WBC # FLD AUTO: 10.78 K/UL — HIGH (ref 3.8–10.5)
WBC UR QL: SIGNIFICANT CHANGE UP (ref 0–?)

## 2018-06-19 PROCEDURE — 99223 1ST HOSP IP/OBS HIGH 75: CPT

## 2018-06-19 PROCEDURE — 73590 X-RAY EXAM OF LOWER LEG: CPT | Mod: 26,LT

## 2018-06-19 RX ORDER — TIOTROPIUM BROMIDE 18 UG/1
1 CAPSULE ORAL; RESPIRATORY (INHALATION) DAILY
Qty: 0 | Refills: 0 | Status: DISCONTINUED | OUTPATIENT
Start: 2018-06-19 | End: 2018-06-26

## 2018-06-19 RX ORDER — ALBUTEROL 90 UG/1
2 AEROSOL, METERED ORAL
Qty: 0 | Refills: 0 | COMMUNITY

## 2018-06-19 RX ORDER — ENOXAPARIN SODIUM 100 MG/ML
40 INJECTION SUBCUTANEOUS EVERY 24 HOURS
Qty: 0 | Refills: 0 | Status: DISCONTINUED | OUTPATIENT
Start: 2018-06-19 | End: 2018-06-26

## 2018-06-19 RX ORDER — ATORVASTATIN CALCIUM 80 MG/1
20 TABLET, FILM COATED ORAL AT BEDTIME
Qty: 0 | Refills: 0 | Status: DISCONTINUED | OUTPATIENT
Start: 2018-06-19 | End: 2018-06-26

## 2018-06-19 RX ORDER — BUDESONIDE AND FORMOTEROL FUMARATE DIHYDRATE 160; 4.5 UG/1; UG/1
2 AEROSOL RESPIRATORY (INHALATION)
Qty: 0 | Refills: 0 | Status: DISCONTINUED | OUTPATIENT
Start: 2018-06-19 | End: 2018-06-26

## 2018-06-19 RX ORDER — URSODIOL 250 MG/1
1 TABLET, FILM COATED ORAL
Qty: 0 | Refills: 0 | COMMUNITY

## 2018-06-19 RX ORDER — OMEPRAZOLE 10 MG/1
1 CAPSULE, DELAYED RELEASE ORAL
Qty: 0 | Refills: 0 | COMMUNITY

## 2018-06-19 RX ORDER — TRAMADOL HYDROCHLORIDE 50 MG/1
1 TABLET ORAL
Qty: 0 | Refills: 0 | COMMUNITY

## 2018-06-19 RX ORDER — ALLOPURINOL 300 MG
100 TABLET ORAL DAILY
Qty: 0 | Refills: 0 | Status: DISCONTINUED | OUTPATIENT
Start: 2018-06-19 | End: 2018-06-26

## 2018-06-19 RX ORDER — ALBUTEROL 90 UG/1
2 AEROSOL, METERED ORAL EVERY 6 HOURS
Qty: 0 | Refills: 0 | Status: DISCONTINUED | OUTPATIENT
Start: 2018-06-19 | End: 2018-06-26

## 2018-06-19 RX ORDER — URSODIOL 250 MG/1
300 TABLET, FILM COATED ORAL THREE TIMES A DAY
Qty: 0 | Refills: 0 | Status: DISCONTINUED | OUTPATIENT
Start: 2018-06-19 | End: 2018-06-26

## 2018-06-19 RX ORDER — POTASSIUM CHLORIDE 20 MEQ
20 PACKET (EA) ORAL DAILY
Qty: 0 | Refills: 0 | Status: DISCONTINUED | OUTPATIENT
Start: 2018-06-19 | End: 2018-06-26

## 2018-06-19 RX ORDER — FUROSEMIDE 40 MG
40 TABLET ORAL DAILY
Qty: 0 | Refills: 0 | Status: DISCONTINUED | OUTPATIENT
Start: 2018-06-19 | End: 2018-06-26

## 2018-06-19 RX ORDER — PIPERACILLIN AND TAZOBACTAM 4; .5 G/20ML; G/20ML
3.38 INJECTION, POWDER, LYOPHILIZED, FOR SOLUTION INTRAVENOUS EVERY 8 HOURS
Qty: 0 | Refills: 0 | Status: DISCONTINUED | OUTPATIENT
Start: 2018-06-19 | End: 2018-06-21

## 2018-06-19 RX ORDER — GABAPENTIN 400 MG/1
2 CAPSULE ORAL
Qty: 0 | Refills: 0 | COMMUNITY

## 2018-06-19 RX ADMIN — Medication 100 MILLIGRAM(S): at 11:35

## 2018-06-19 RX ADMIN — PIPERACILLIN AND TAZOBACTAM 25 GRAM(S): 4; .5 INJECTION, POWDER, LYOPHILIZED, FOR SOLUTION INTRAVENOUS at 22:19

## 2018-06-19 RX ADMIN — URSODIOL 300 MILLIGRAM(S): 250 TABLET, FILM COATED ORAL at 07:10

## 2018-06-19 RX ADMIN — PIPERACILLIN AND TAZOBACTAM 25 GRAM(S): 4; .5 INJECTION, POWDER, LYOPHILIZED, FOR SOLUTION INTRAVENOUS at 14:21

## 2018-06-19 RX ADMIN — PIPERACILLIN AND TAZOBACTAM 25 GRAM(S): 4; .5 INJECTION, POWDER, LYOPHILIZED, FOR SOLUTION INTRAVENOUS at 06:30

## 2018-06-19 RX ADMIN — URSODIOL 300 MILLIGRAM(S): 250 TABLET, FILM COATED ORAL at 17:48

## 2018-06-19 RX ADMIN — ATORVASTATIN CALCIUM 20 MILLIGRAM(S): 80 TABLET, FILM COATED ORAL at 22:19

## 2018-06-19 RX ADMIN — URSODIOL 300 MILLIGRAM(S): 250 TABLET, FILM COATED ORAL at 22:18

## 2018-06-19 RX ADMIN — Medication 650 MILLIGRAM(S): at 00:30

## 2018-06-19 RX ADMIN — ENOXAPARIN SODIUM 40 MILLIGRAM(S): 100 INJECTION SUBCUTANEOUS at 06:30

## 2018-06-19 RX ADMIN — Medication 20 MILLIEQUIVALENT(S): at 11:34

## 2018-06-19 RX ADMIN — TIOTROPIUM BROMIDE 1 CAPSULE(S): 18 CAPSULE ORAL; RESPIRATORY (INHALATION) at 09:43

## 2018-06-19 RX ADMIN — BUDESONIDE AND FORMOTEROL FUMARATE DIHYDRATE 2 PUFF(S): 160; 4.5 AEROSOL RESPIRATORY (INHALATION) at 09:43

## 2018-06-19 RX ADMIN — Medication 40 MILLIGRAM(S): at 06:36

## 2018-06-19 NOTE — DISCHARGE NOTE ADULT - ADDITIONAL INSTRUCTIONS
wound care: Left posterior lower leg: Cleanse wound and periwound skin with NS. Pat dry. Apply Liquid barrier film to periwound skin. Apply Aquacel Hydrofiber to wound base. Cover with 4x4 gauze, secure with Kerlix wrap and paper tape. Change daily while in pt. May change every other day upon discharge.    Moisture associated dermatitis- Cleanse intertriginous folds with lukewarm soap and water. Dry well. Apply Interdry textile sheeting, under intertriginous folds leaving 2 inches exposed at ends to wick, remove to wash & dry affected area, then replace. Individual sheeting may be used for up to 5 days unless soiled.

## 2018-06-19 NOTE — H&P ADULT - HISTORY OF PRESENT ILLNESS
76 yo f h/o copd on home O2 2L "as needed", chronic venous stasis of BL legs. Pt notes 2 weeks of progressively worsening BL leg swelling, erythema, pain of distal legs, oozing from distal of left leg ulcer. Pt endorses associated subjective fevers, chills. Denies cp, sob. T max in ed 100.8 (O). Pt was unable to support her legs when getting out of bed today, slid onto floor and activated  life alert. Pt denies trauma, LOC. Walker dependent , very limited ambulation at baseline; has tenant help her transfer from bed to wheelchair when she is at her baseline 76 yo f h/o copd on home O2 2L "as needed", chronic venous stasis of BL legs. Pt notes 2 weeks of progressively worsening BL leg swelling, erythema, pain of distal legs, oozing from distal of left leg ulcer. Pt endorses associated subjective fevers, chills. Denies cp, sob. T max in ed 100.8 (O). Pt was unable to support her legs when getting out of bed today, slid onto floor and activated  life alert. Pt denies trauma, LOC. Walker dependent , very limited ambulation at baseline; has tenant help her transfer from bed to wheelchair when she is at her baseline   Reports not following up with a wound care or vascular MD for over a year

## 2018-06-19 NOTE — DISCHARGE NOTE ADULT - PLAN OF CARE
management elevate both legs. Follow up outpatient with vascular Dr. Hernandez, call for appointment see above continue with home inhalers Improvement of symptoms Continue antibiotic Keflex as prescribed. Elevate both legs. Follow up outpatient with vascular Dr. Hernandez, call to schedule an appointment. Continue with home inhalers. Follow up with primary care doctor within 1-2 weeks of discharge. Continue antibiotic Keflex as prescribed. Elevate both legs. Follow up outpatient with vascular Dr. Hernandez, call to schedule an appointment.· Recommendations	  Recommend follow up care at outpatient Vascular MD or [facility]: [phone]. Address: [address] [place] [address].   Consider Unna boot application outpt.  Left posterior lower leg: Cleanse wound and periwound skin with NS. Pat dry. Apply Liquid barrier film to periwound skin. Apply Aquacel Hydrofiber to wound base. Cover with 4x4 gauze, secure with Kerlix wrap and paper tape. Change daily while in pt. May change every other day upon discharge.    Moisture associated dermatitis- Cleanse intertriginous folds with lukewarm soap and water. Dry well. Apply Interdry textile sheeting, under intertriginous folds leaving 2 inches exposed at ends to wick, remove to wash & dry affected area, then replace. Individual sheeting may be used for up to 5 days unless soiled.     Incontinence/Moisture associated dermatitis within sacral fold and bilateral buttocks- provide perineal care every shift and prn with episodes of incontinence, dry well. Apply TRIAD moisture barrier paste. elevate legs remain free of exacerbation Continue with home inhalers. Follow up with primary care doctor within 1-2 weeks of discharge.  continue 2liters home oxygen as needed maintain adequate blood pressure control Please check your blood pressure prior to taking your blood pressure medications.  Please check your blood pressure prior to taking your blood pressure medications. Continue antibiotic Keflex as prescribed. Last day 6/25/2018   Elevate both legs. Follow up outpatient with vascular Dr. Hernandez, call to schedule an appointment.· Recommendations	  Recommend follow up care at outpatient Vascular MD or [facility]: [phone]. Address: [address] [place] [address].   Consider Unna boot application outpt.  Left posterior lower leg: Cleanse wound and periwound skin with NS. Pat dry. Apply Liquid barrier film to periwound skin. Apply Aquacel Hydrofiber to wound base. Cover with 4x4 gauze, secure with Kerlix wrap and paper tape. Change daily while in pt. May change every other day upon discharge.    Moisture associated dermatitis- Cleanse intertriginous folds with lukewarm soap and water. Dry well. Apply Interdry textile sheeting, under intertriginous folds leaving 2 inches exposed at ends to wick, remove to wash & dry affected area, then replace. Individual sheeting may be used for up to 5 days unless soiled.     Incontinence/Moisture associated dermatitis within sacral fold and bilateral buttocks- provide perineal care every shift and prn with episodes of incontinence, dry well. Apply TRIAD moisture barrier paste. Continue antibiotic Keflex as prescribed. keflex 500 mg po q 6 through 6/25.   Elevate both legs. Follow up outpatient with vascular Dr. Hernandez, call to schedule an appointment.

## 2018-06-19 NOTE — DISCHARGE NOTE ADULT - CARE PROVIDERS DIRECT ADDRESSES
[email or web address]

## 2018-06-19 NOTE — H&P ADULT - PROBLEM SELECTOR PLAN 1
-s/p vanco and zosyn in ed  -given constitutional symptoms with fever, will c/w zosyn for now  -consult wound care and vascular tomorrow, address role for topical or systemic steroids  -plain film of left leg ordered

## 2018-06-19 NOTE — DISCHARGE NOTE ADULT - CARE PROVIDER_API CALL
Laureen Hernnadez), Surgery; Vascular Surgery  [address] [facility]  [address]  [place] [address]  Phone: [phone]  Fax: [phone] Laureen Hernandez), Surgery; Vascular Surgery  [address] [facility]  [address]  [place] [address]  Phone: [phone]  Fax: [phone]    Eliezer Vu  Phone: [phone]  Fax: (       -

## 2018-06-19 NOTE — DISCHARGE NOTE ADULT - MEDICATION SUMMARY - MEDICATIONS TO STOP TAKING
I will STOP taking the medications listed below when I get home from the hospital:    enoxaparin  -- 40 milligram(s) subcutaneous once a day    oseltamivir 75 mg oral capsule  -- 1 cap(s) by mouth 2 times a day    guaiFENesin 100 mg/5 mL oral liquid  -- 10 milliliter(s) by mouth every 6 hours, As needed, Cough    oxyCODONE 20 mg oral tablet  -- 1 tab(s) by mouth 3 times a day, As Needed

## 2018-06-19 NOTE — H&P ADULT - PROBLEM SELECTOR PLAN 2
-c/w O2 (will order nightly for now), Symbicort; clarify with pharmacy if pt still on spiriva; confirm rest of med list with email to pharmacy which I will sent tonight  -albuterol prn

## 2018-06-19 NOTE — DISCHARGE NOTE ADULT - HOSPITAL COURSE
78 yo f h/o copd on home O2 2L "as needed", chronic venous stasis of BL legs. Pt notes 2 weeks of progressively worsening BL leg swelling, erythema, pain of distal legs, oozing from distal of left leg ulcer. 78 yo f h/o copd on home O2 2L "as needed", chronic venous stasis of BL legs. Pt notes 2 weeks of progressively worsening BL leg swelling, erythema, pain of distal legs, oozing from distal of left leg ulcer.     Hospital Course:  Venous stasis dermatitis of both lower extremities   -s/p vanco and zosyn in ed  -given constitutional symptoms with fever, will c/w zosyn for now  -follow up with vascular Dr. David ferminpt (pt has seen her in the past)  -Wound care consulted: orders entered   -ID consulted:  At this time the ulcer does not appear infected.  There is no surrounding cellulitis at this time.   Her blood culture was positive in 1 of 4 bottles for coagulase negative staph.   This is most consistent with a contaminant.  ? cellulitis at presentation.  Change abx to keflex 500 mg po q 6 through 6/25.    Chronic obstructive pulmonary disease   -c/w O2 (will order nightly for now), Symbicort; clarify with pharmacy if pt still on spiriva; confirm rest of med list with email to pharmacy which I will sent tonight  -albuterol prn.     Dispo: 78 yo f h/o copd on home O2 2L "as needed", chronic venous stasis of BL legs. Pt notes 2 weeks of progressively worsening BL leg swelling, erythema, pain of distal legs, oozing from distal of left leg ulcer.     Hospital Course:  Venous stasis dermatitis of both lower extremities   -s/p vanco and zosyn in ed  -given constitutional symptoms with fever, will c/w zosyn for now  -follow up with vascular Dr. Hernandez outpt (pt has seen her in the past)  -Wound care consulted: orders entered   -ID consulted:  At this time the ulcer does not appear infected.  There is no surrounding cellulitis at this time.   Her blood culture was positive in 1 of 4 bottles for coagulase negative staph.   This is most consistent with a contaminant.  ? cellulitis at presentation.  Change abx to keflex 500 mg po q 6 through 6/25.  	  Recommend follow up care at outpatient Vascular MD or [facility]: [phone]. Address: [address] [place] [address].   Consider Unna boot application outpt.  Left posterior lower leg: Cleanse wound and periwound skin with NS. Pat dry. Apply Liquid barrier film to periwound skin. Apply Aquacel Hydrofiber to wound base. Cover with 4x4 gauze, secure with Kerlix wrap and paper tape. Change daily while in pt. May change every other day upon discharge.    Moisture associated dermatitis- Cleanse intertriginous folds with lukewarm soap and water. Dry well. Apply Interdry textile sheeting, under intertriginous folds leaving 2 inches exposed at ends to wick, remove to wash & dry affected area, then replace. Individual sheeting may be used for up to 5 days unless soiled.     Incontinence/Moisture associated dermatitis within sacral fold and bilateral buttocks- provide perineal care every shift and prn with episodes of incontinence, dry well. Apply TRIAD moisture barrier paste.    Chronic obstructive pulmonary disease   -c/w O2 (will order nightly for now), Symbicort; clarify with pharmacy if pt still on spiriva; confirm rest of med list with email to pharmacy which I will sent tonight  -albuterol prn.   -cont home oxygen as needed    Dispo: 76 yo f h/o copd on home O2 2L "as needed", chronic venous stasis of BL legs. Pt notes 2 weeks of progressively worsening BL leg swelling, erythema, pain of distal legs, oozing from distal of left leg ulcer.   Venous stasis dermatitis of both lower extremities, treated with IV Abx. Pt has been evaluated by ID, wound care  and vascular. IV antibiotics changed to PO trough 6/25 Pt is optimized for DC     Recommend follow up care at outpatient Vascular MD or [facility]: [phone]. Address: [address] [place] [address].   Consider Unna boot application outpt.  Left posterior lower leg: Cleanse wound and periwound skin with NS. Pat dry. Apply Liquid barrier film to periwound skin. Apply Aquacel Hydrofiber to wound base. Cover with 4x4 gauze, secure with Kerlix wrap and paper tape. Change daily while in pt. May change every other day upon discharge.    Moisture associated dermatitis- Cleanse intertriginous folds with lukewarm soap and water. Dry well. Apply Interdry textile sheeting, under intertriginous folds leaving 2 inches exposed at ends to wick, remove to wash & dry affected area, then replace. Individual sheeting may be used for up to 5 days unless soiled.

## 2018-06-19 NOTE — H&P ADULT - NSHPLABSRESULTS_GEN_ALL_CORE
Vital Signs Last 24 Hrs  T(C): 38.2 (18 Jun 2018 20:05), Max: 38.2 (18 Jun 2018 20:05)  T(F): 100.8 (18 Jun 2018 20:05), Max: 100.8 (18 Jun 2018 20:05)  HR: 107 (18 Jun 2018 20:05) (107 - 113)  BP: 152/64 (18 Jun 2018 20:05) (152/64 - 162/86)  BP(mean): --  RR: 16 (18 Jun 2018 20:05) (16 - 18)  SpO2: 94% (18 Jun 2018 20:05) (85% - 94%)                            11.3   10.91 )-----------( 144      ( 18 Jun 2018 20:05 )             35.9     06-18    136  |  101  |  24<H>  ----------------------------<  116<H>  4.5   |  23  |  1.07    Ca    8.8      18 Jun 2018 20:05    TPro  8.0  /  Alb  3.1<L>  /  TBili  0.7  /  DBili  x   /  AST  28  /  ALT  9   /  AlkPhos  164<H>  06-18    CAPILLARY BLOOD GLUCOSE

## 2018-06-19 NOTE — DISCHARGE NOTE ADULT - PROVIDER TOKENS
QIAN:'[id number]' TOKEN:'[id number]',FREE:[LAST:[Mirella],FIRST:[Eliezer],PHONE:[phone]],FAX:[(   )    -]]

## 2018-06-19 NOTE — DISCHARGE NOTE ADULT - PATIENT PORTAL LINK FT
You can access the [facility] Patient Portal, offered by [facility], by registering with the following website: [email or web address]

## 2018-06-19 NOTE — H&P ADULT - NSHPREVIEWOFSYSTEMS_GEN_ALL_CORE
Review of Systems:   CONSTITUTIONAL: + fever,  fatigue  EYES: No eye pain, visual disturbances, or discharge  ENMT:  No difficulty hearing, tinnitus, vertigo; No sinus or throat pain  NECK: No pain or stiffness  RESPIRATORY: No cough, wheezing, chills or hemoptysis; No shortness of breath  CARDIOVASCULAR: No chest pain, palpitations, dizziness  GASTROINTESTINAL: No abdominal or epigastric pain. No nausea, vomiting, or hematemesis; No diarrhea or constipation. No melena or hematochezia.  GENITOURINARY: No dysuria, frequency, hematuria, or incontinence  NEUROLOGICAL: No headaches, memory loss, loss of strength, numbness, or tremors  MUSCULOSKELETAL: BL lower extremity pain

## 2018-06-19 NOTE — DISCHARGE NOTE ADULT - CARE PLAN
Principal Discharge DX:	Venous stasis dermatitis of both lower extremities  Goal:	management  Assessment and plan of treatment:	elevate both legs. Follow up outpatient with vascular Dr. Hernandez, call for appointment  Secondary Diagnosis:	Bilateral leg edema  Goal:	see above  Secondary Diagnosis:	Chronic obstructive pulmonary disease, unspecified COPD type  Assessment and plan of treatment:	continue with home inhalers  Secondary Diagnosis:	Hypertension Principal Discharge DX:	Venous stasis dermatitis of both lower extremities  Goal:	Improvement of symptoms  Assessment and plan of treatment:	Continue antibiotic Keflex as prescribed. Elevate both legs. Follow up outpatient with vascular Dr. Hernandez, call to schedule an appointment.  Secondary Diagnosis:	Bilateral leg edema  Goal:	see above  Secondary Diagnosis:	Chronic obstructive pulmonary disease, unspecified COPD type  Assessment and plan of treatment:	Continue with home inhalers. Follow up with primary care doctor within 1-2 weeks of discharge.  Secondary Diagnosis:	Hypertension Principal Discharge DX:	Venous stasis dermatitis of both lower extremities  Goal:	Improvement of symptoms  Assessment and plan of treatment:	Continue antibiotic Keflex as prescribed. Elevate both legs. Follow up outpatient with vascular Dr. Hernandez, call to schedule an appointment.· Recommendations	  Recommend follow up care at outpatient Vascular MD or [facility]: [phone]. Address: [address] [place] [address].   Consider Unna boot application outpt.  Left posterior lower leg: Cleanse wound and periwound skin with NS. Pat dry. Apply Liquid barrier film to periwound skin. Apply Aquacel Hydrofiber to wound base. Cover with 4x4 gauze, secure with Kerlix wrap and paper tape. Change daily while in pt. May change every other day upon discharge.    Moisture associated dermatitis- Cleanse intertriginous folds with lukewarm soap and water. Dry well. Apply Interdry textile sheeting, under intertriginous folds leaving 2 inches exposed at ends to wick, remove to wash & dry affected area, then replace. Individual sheeting may be used for up to 5 days unless soiled.     Incontinence/Moisture associated dermatitis within sacral fold and bilateral buttocks- provide perineal care every shift and prn with episodes of incontinence, dry well. Apply TRIAD moisture barrier paste.  Secondary Diagnosis:	Bilateral leg edema  Goal:	elevate legs  Assessment and plan of treatment:	Continue antibiotic Keflex as prescribed. Elevate both legs. Follow up outpatient with vascular Dr. Hernandez, call to schedule an appointment.  Secondary Diagnosis:	Chronic obstructive pulmonary disease, unspecified COPD type  Goal:	remain free of exacerbation  Assessment and plan of treatment:	Continue with home inhalers. Follow up with primary care doctor within 1-2 weeks of discharge.  continue 2liters home oxygen as needed  Secondary Diagnosis:	Hypertension  Goal:	maintain adequate blood pressure control  Assessment and plan of treatment:	Please check your blood pressure prior to taking your blood pressure medications.  Please check your blood pressure prior to taking your blood pressure medications. Principal Discharge DX:	Venous stasis dermatitis of both lower extremities  Goal:	Improvement of symptoms  Assessment and plan of treatment:	Continue antibiotic Keflex as prescribed. Last day 6/25/2018   Elevate both legs. Follow up outpatient with vascular Dr. Hernandez, call to schedule an appointment.· Recommendations	  Recommend follow up care at outpatient Vascular MD or [facility]: [phone]. Address: [address] [place] [address].   Consider Unna boot application outpt.  Left posterior lower leg: Cleanse wound and periwound skin with NS. Pat dry. Apply Liquid barrier film to periwound skin. Apply Aquacel Hydrofiber to wound base. Cover with 4x4 gauze, secure with Kerlix wrap and paper tape. Change daily while in pt. May change every other day upon discharge.    Moisture associated dermatitis- Cleanse intertriginous folds with lukewarm soap and water. Dry well. Apply Interdry textile sheeting, under intertriginous folds leaving 2 inches exposed at ends to wick, remove to wash & dry affected area, then replace. Individual sheeting may be used for up to 5 days unless soiled.     Incontinence/Moisture associated dermatitis within sacral fold and bilateral buttocks- provide perineal care every shift and prn with episodes of incontinence, dry well. Apply TRIAD moisture barrier paste.  Secondary Diagnosis:	Bilateral leg edema  Goal:	elevate legs  Assessment and plan of treatment:	Continue antibiotic Keflex as prescribed. keflex 500 mg po q 6 through 6/25.   Elevate both legs. Follow up outpatient with vascular Dr. Hernandez, call to schedule an appointment.  Secondary Diagnosis:	Chronic obstructive pulmonary disease, unspecified COPD type  Goal:	remain free of exacerbation  Assessment and plan of treatment:	Continue with home inhalers. Follow up with primary care doctor within 1-2 weeks of discharge.  continue 2liters home oxygen as needed  Secondary Diagnosis:	Hypertension  Goal:	maintain adequate blood pressure control  Assessment and plan of treatment:	Please check your blood pressure prior to taking your blood pressure medications.  Please check your blood pressure prior to taking your blood pressure medications.

## 2018-06-19 NOTE — H&P ADULT - PROBLEM SELECTOR PLAN 3
IMPROVE VTE Individual Risk Assessment    RISK                                                          Points  [] Previous VTE                                           3  [] Thrombophilia                                        2  [] Lower limb paralysis                              2   [] Current Cancer                                       2   [x] Immobilization > 24 hrs                        1  [] ICU/CCU stay > 24 hours                       1  [x] Age > 60                                                   1    IMPROVE VTE Score: 2  lovenox

## 2018-06-19 NOTE — DISCHARGE NOTE ADULT - MEDICATION SUMMARY - MEDICATIONS TO TAKE
I will START or STAY ON the medications listed below when I get home from the hospital:    traMADol 50 mg oral tablet  -- 1 tab(s) by mouth every 4 to 6 hours, As Needed  -- Indication: For Pain control     acetaminophen 325 mg oral tablet  -- 2 tab(s) by mouth every 6 hours, As needed, Mild Pain (1 - 3)  -- Indication: For Pain control     gabapentin 400 mg oral capsule  -- 2 cap(s) by mouth 3 times a day  -- Indication: For Pain control     allopurinol 100 mg oral tablet  -- 1 tab(s) by mouth once a day  -- Indication: For Prophylactic measure    pravastatin 80 mg oral tablet  -- 1 tab(s) by mouth once a day  -- Indication: For HLD    tiotropium 18 mcg inhalation capsule  -- 1 cap(s) inhaled once a day (at bedtime)  -- Indication: For COPD    ProAir HFA 90 mcg/inh inhalation aerosol  -- 2 puff(s) inhaled 4 times a day, As Needed  -- Indication: For COPD    budesonide-formoterol 80 mcg-4.5 mcg/inh inhalation aerosol  -- 2 puff(s) inhaled 2 times a day   -- Indication: For COPD    cephalexin 500 mg oral capsule  -- 1 cap(s) by mouth every 6 hours, through 6/25/18  -- Indication: For Cellulitis    furosemide 40 mg oral tablet  -- 1 tab(s) by mouth once a day  -- Indication: For Bilateral leg edema    ursodiol 300 mg oral capsule  -- 1 cap(s) by mouth 3 times a day  -- Indication: For Prophylactic measure    potassium chloride 20 mEq oral tablet, extended release  -- 1 tab(s) by mouth once a day  -- Indication: For Prophylactic measure    omeprazole 20 mg oral delayed release tablet  -- 1 tab(s) by mouth 2 times a day  -- Indication: For Prophylactic measure

## 2018-06-19 NOTE — H&P ADULT - NSHPPHYSICALEXAM_GEN_ALL_CORE
PHYSICAL EXAM:      Constitutional: NAD, well-groomed, well-developed  HEENT: EOMI, Normal Hearing  Neck: No LAD, No JVD  Back: Normal spine flexure, No CVA tenderness  Respiratory: CTAB  Cardiovascular: S1 and S2, RRR  Gastrointestinal: BS+, soft, NT/ND  Extremities: No peripheral edema  Vascular: 2+ peripheral pulses  Neurological: A/O x 3  Psychiatric: Normal mood, normal affect  Musculoskeletal: 5/5 strength b/l upper extremities; bl leg strength limited by pain  Skin: BL distal leg venous stasis changes, hyperpigmentation, swelling, erythema, warmth; oozing from distal left leg PHYSICAL EXAM:      Constitutional: NAD, well-groomed, well-developed  HEENT: EOMI, Normal Hearing; dried blood on teeth; no active bleeding or obvious lac on my exam  Neck: No LAD, No JVD  Back: Normal spine flexure, No CVA tenderness  Respiratory: CTAB  Cardiovascular: S1 and S2, RRR  Gastrointestinal: BS+, soft, NT/ND  Extremities: No peripheral edema  Vascular: 2+ peripheral pulses  Neurological: A/O x 3  Psychiatric: Normal mood, normal affect  Musculoskeletal: 5/5 strength b/l upper extremities; bl leg strength limited by pain  Skin: BL distal leg venous stasis changes, hyperpigmentation, swelling, erythema, warmth; oozing from distal left leg

## 2018-06-20 LAB
BUN SERPL-MCNC: 19 MG/DL — SIGNIFICANT CHANGE UP (ref 7–23)
CALCIUM SERPL-MCNC: 8.7 MG/DL — SIGNIFICANT CHANGE UP (ref 8.4–10.5)
CHLORIDE SERPL-SCNC: 101 MMOL/L — SIGNIFICANT CHANGE UP (ref 98–107)
CO2 SERPL-SCNC: 24 MMOL/L — SIGNIFICANT CHANGE UP (ref 22–31)
CREAT SERPL-MCNC: 1.14 MG/DL — SIGNIFICANT CHANGE UP (ref 0.5–1.3)
GLUCOSE SERPL-MCNC: 131 MG/DL — HIGH (ref 70–99)
HCT VFR BLD CALC: 29.6 % — LOW (ref 34.5–45)
HGB BLD-MCNC: 9.4 G/DL — LOW (ref 11.5–15.5)
MCHC RBC-ENTMCNC: 28.1 PG — SIGNIFICANT CHANGE UP (ref 27–34)
MCHC RBC-ENTMCNC: 31.8 % — LOW (ref 32–36)
MCV RBC AUTO: 88.4 FL — SIGNIFICANT CHANGE UP (ref 80–100)
NRBC # FLD: 0 — SIGNIFICANT CHANGE UP
PLATELET # BLD AUTO: 99 K/UL — LOW (ref 150–400)
PMV BLD: 12.8 FL — SIGNIFICANT CHANGE UP (ref 7–13)
POTASSIUM SERPL-MCNC: 3 MMOL/L — LOW (ref 3.5–5.3)
POTASSIUM SERPL-SCNC: 3 MMOL/L — LOW (ref 3.5–5.3)
RBC # BLD: 3.35 M/UL — LOW (ref 3.8–5.2)
RBC # FLD: 16.2 % — HIGH (ref 10.3–14.5)
SODIUM SERPL-SCNC: 137 MMOL/L — SIGNIFICANT CHANGE UP (ref 135–145)
WBC # BLD: 6.49 K/UL — SIGNIFICANT CHANGE UP (ref 3.8–10.5)
WBC # FLD AUTO: 6.49 K/UL — SIGNIFICANT CHANGE UP (ref 3.8–10.5)

## 2018-06-20 RX ORDER — POTASSIUM CHLORIDE 20 MEQ
40 PACKET (EA) ORAL ONCE
Qty: 0 | Refills: 0 | Status: COMPLETED | OUTPATIENT
Start: 2018-06-20 | End: 2018-06-20

## 2018-06-20 RX ORDER — GABAPENTIN 400 MG/1
400 CAPSULE ORAL THREE TIMES A DAY
Qty: 0 | Refills: 0 | Status: DISCONTINUED | OUTPATIENT
Start: 2018-06-20 | End: 2018-06-21

## 2018-06-20 RX ADMIN — GABAPENTIN 400 MILLIGRAM(S): 400 CAPSULE ORAL at 13:09

## 2018-06-20 RX ADMIN — PIPERACILLIN AND TAZOBACTAM 25 GRAM(S): 4; .5 INJECTION, POWDER, LYOPHILIZED, FOR SOLUTION INTRAVENOUS at 06:12

## 2018-06-20 RX ADMIN — PIPERACILLIN AND TAZOBACTAM 25 GRAM(S): 4; .5 INJECTION, POWDER, LYOPHILIZED, FOR SOLUTION INTRAVENOUS at 13:09

## 2018-06-20 RX ADMIN — Medication 40 MILLIGRAM(S): at 05:37

## 2018-06-20 RX ADMIN — Medication 20 MILLIEQUIVALENT(S): at 12:31

## 2018-06-20 RX ADMIN — Medication 100 MILLIGRAM(S): at 12:31

## 2018-06-20 RX ADMIN — URSODIOL 300 MILLIGRAM(S): 250 TABLET, FILM COATED ORAL at 21:20

## 2018-06-20 RX ADMIN — ENOXAPARIN SODIUM 40 MILLIGRAM(S): 100 INJECTION SUBCUTANEOUS at 05:37

## 2018-06-20 RX ADMIN — BUDESONIDE AND FORMOTEROL FUMARATE DIHYDRATE 2 PUFF(S): 160; 4.5 AEROSOL RESPIRATORY (INHALATION) at 21:20

## 2018-06-20 RX ADMIN — BUDESONIDE AND FORMOTEROL FUMARATE DIHYDRATE 2 PUFF(S): 160; 4.5 AEROSOL RESPIRATORY (INHALATION) at 10:00

## 2018-06-20 RX ADMIN — PIPERACILLIN AND TAZOBACTAM 25 GRAM(S): 4; .5 INJECTION, POWDER, LYOPHILIZED, FOR SOLUTION INTRAVENOUS at 21:21

## 2018-06-20 RX ADMIN — Medication 40 MILLIEQUIVALENT(S): at 17:05

## 2018-06-20 RX ADMIN — TIOTROPIUM BROMIDE 1 CAPSULE(S): 18 CAPSULE ORAL; RESPIRATORY (INHALATION) at 11:00

## 2018-06-20 RX ADMIN — GABAPENTIN 400 MILLIGRAM(S): 400 CAPSULE ORAL at 21:20

## 2018-06-20 RX ADMIN — ATORVASTATIN CALCIUM 20 MILLIGRAM(S): 80 TABLET, FILM COATED ORAL at 21:19

## 2018-06-20 RX ADMIN — URSODIOL 300 MILLIGRAM(S): 250 TABLET, FILM COATED ORAL at 05:37

## 2018-06-20 RX ADMIN — URSODIOL 300 MILLIGRAM(S): 250 TABLET, FILM COATED ORAL at 13:09

## 2018-06-20 NOTE — PHYSICAL THERAPY INITIAL EVALUATION ADULT - LEVEL OF INDEPENDENCE: SUPINE/SIT, REHAB EVAL
not assessed --> pt. deferred at this time, despite significant (+) support & encouragement, secondary to concern about weightbearing as well as feeling like she has "no strength"

## 2018-06-20 NOTE — PHYSICAL THERAPY INITIAL EVALUATION ADULT - ACTIVE RANGE OF MOTION EXAMINATION, REHAB EVAL
deficits as listed below/UE/LE WNL's except ~ 3/4 range Bilateral elbows and Left ankle, ~ 1/2 range Bilateral hip/knee, ~ 1/4 range Bilateral shoulders

## 2018-06-20 NOTE — PHYSICAL THERAPY INITIAL EVALUATION ADULT - PERTINENT HX OF CURRENT PROBLEM, REHAB EVAL
Pt. is a 76 y/o female admitted to [facility] on 06/18/18 with a dx of cellulitis of Bilateral LE's, leg swelling, and weakness.  PT consult request secondary to weakness.  H/O COPD on home O2.  CXr = small Left pleural effusion.  (-) Left tibia/fibula X-rays.

## 2018-06-20 NOTE — PHYSICAL THERAPY INITIAL EVALUATION ADULT - ADDITIONAL COMMENTS
Pt. reports transferring from recliner <--> commode with assistance from her tenant.  Pt. was able to ambulate a few steps to assist with this transfer, but has been unable to ambulate further x ~ 3-4 months.   Pt. owns a wheelchair and uses a rolling walker.  Pt. also owns a Rollator, which she does not use.    Pt. left in bed post-PT in NAD with all lines/tubes intact & call bell within reach.  Son at bedside.  RAEGAN beckham.

## 2018-06-20 NOTE — PHYSICAL THERAPY INITIAL EVALUATION ADULT - PATIENT PROFILE REVIEW, REHAB EVAL
no formal activity order; consult with Beatris Keita RN --> pt. OK for PT as tolerated if agreeable/yes

## 2018-06-20 NOTE — PHYSICAL THERAPY INITIAL EVALUATION ADULT - CRITERIA FOR SKILLED THERAPEUTIC INTERVENTIONS
predicted duration of therapy intervention/TBD upon functional mobility assessment/impairments found/therapy frequency/anticipated discharge recommendation

## 2018-06-21 LAB
BUN SERPL-MCNC: 18 MG/DL — SIGNIFICANT CHANGE UP (ref 7–23)
CALCIUM SERPL-MCNC: 8.2 MG/DL — LOW (ref 8.4–10.5)
CHLORIDE SERPL-SCNC: 103 MMOL/L — SIGNIFICANT CHANGE UP (ref 98–107)
CO2 SERPL-SCNC: 24 MMOL/L — SIGNIFICANT CHANGE UP (ref 22–31)
CREAT SERPL-MCNC: 1.1 MG/DL — SIGNIFICANT CHANGE UP (ref 0.5–1.3)
GLUCOSE SERPL-MCNC: 86 MG/DL — SIGNIFICANT CHANGE UP (ref 70–99)
METHOD TYPE: SIGNIFICANT CHANGE UP
ORGANISM # SPEC MICROSCOPIC CNT: SIGNIFICANT CHANGE UP
ORGANISM # SPEC MICROSCOPIC CNT: SIGNIFICANT CHANGE UP
POTASSIUM SERPL-MCNC: 3.5 MMOL/L — SIGNIFICANT CHANGE UP (ref 3.5–5.3)
POTASSIUM SERPL-SCNC: 3.5 MMOL/L — SIGNIFICANT CHANGE UP (ref 3.5–5.3)
SODIUM SERPL-SCNC: 138 MMOL/L — SIGNIFICANT CHANGE UP (ref 135–145)

## 2018-06-21 PROCEDURE — 99222 1ST HOSP IP/OBS MODERATE 55: CPT

## 2018-06-21 RX ORDER — CEPHALEXIN 500 MG
500 CAPSULE ORAL EVERY 6 HOURS
Qty: 0 | Refills: 0 | Status: DISCONTINUED | OUTPATIENT
Start: 2018-06-21 | End: 2018-06-26

## 2018-06-21 RX ORDER — VANCOMYCIN HCL 1 G
1000 VIAL (EA) INTRAVENOUS ONCE
Qty: 0 | Refills: 0 | Status: COMPLETED | OUTPATIENT
Start: 2018-06-21 | End: 2018-06-21

## 2018-06-21 RX ORDER — ACETAMINOPHEN 500 MG
650 TABLET ORAL EVERY 6 HOURS
Qty: 0 | Refills: 0 | Status: DISCONTINUED | OUTPATIENT
Start: 2018-06-21 | End: 2018-06-26

## 2018-06-21 RX ORDER — GABAPENTIN 400 MG/1
800 CAPSULE ORAL EVERY 8 HOURS
Qty: 0 | Refills: 0 | Status: DISCONTINUED | OUTPATIENT
Start: 2018-06-21 | End: 2018-06-26

## 2018-06-21 RX ADMIN — TIOTROPIUM BROMIDE 1 CAPSULE(S): 18 CAPSULE ORAL; RESPIRATORY (INHALATION) at 10:02

## 2018-06-21 RX ADMIN — Medication 650 MILLIGRAM(S): at 13:10

## 2018-06-21 RX ADMIN — GABAPENTIN 800 MILLIGRAM(S): 400 CAPSULE ORAL at 21:49

## 2018-06-21 RX ADMIN — ATORVASTATIN CALCIUM 20 MILLIGRAM(S): 80 TABLET, FILM COATED ORAL at 21:49

## 2018-06-21 RX ADMIN — URSODIOL 300 MILLIGRAM(S): 250 TABLET, FILM COATED ORAL at 21:49

## 2018-06-21 RX ADMIN — ENOXAPARIN SODIUM 40 MILLIGRAM(S): 100 INJECTION SUBCUTANEOUS at 05:58

## 2018-06-21 RX ADMIN — BUDESONIDE AND FORMOTEROL FUMARATE DIHYDRATE 2 PUFF(S): 160; 4.5 AEROSOL RESPIRATORY (INHALATION) at 21:50

## 2018-06-21 RX ADMIN — Medication 500 MILLIGRAM(S): at 23:05

## 2018-06-21 RX ADMIN — PIPERACILLIN AND TAZOBACTAM 25 GRAM(S): 4; .5 INJECTION, POWDER, LYOPHILIZED, FOR SOLUTION INTRAVENOUS at 14:46

## 2018-06-21 RX ADMIN — Medication 40 MILLIGRAM(S): at 05:18

## 2018-06-21 RX ADMIN — URSODIOL 300 MILLIGRAM(S): 250 TABLET, FILM COATED ORAL at 05:57

## 2018-06-21 RX ADMIN — BUDESONIDE AND FORMOTEROL FUMARATE DIHYDRATE 2 PUFF(S): 160; 4.5 AEROSOL RESPIRATORY (INHALATION) at 09:46

## 2018-06-21 RX ADMIN — Medication 650 MILLIGRAM(S): at 12:10

## 2018-06-21 RX ADMIN — PIPERACILLIN AND TAZOBACTAM 25 GRAM(S): 4; .5 INJECTION, POWDER, LYOPHILIZED, FOR SOLUTION INTRAVENOUS at 05:19

## 2018-06-21 RX ADMIN — URSODIOL 300 MILLIGRAM(S): 250 TABLET, FILM COATED ORAL at 14:46

## 2018-06-21 RX ADMIN — Medication 20 MILLIEQUIVALENT(S): at 12:10

## 2018-06-21 RX ADMIN — Medication 100 MILLIGRAM(S): at 12:11

## 2018-06-21 RX ADMIN — GABAPENTIN 400 MILLIGRAM(S): 400 CAPSULE ORAL at 05:18

## 2018-06-21 RX ADMIN — Medication 250 MILLIGRAM(S): at 10:04

## 2018-06-21 NOTE — ADVANCED PRACTICE NURSE CONSULT - REASON FOR CONSULT
Patient seen on skin care rounds after wound care referral received for assessment of skin impairment and recommendations of topical management. Chart reviewed: WBC 6.49k/uL, Serum albumin 3.1g/dL, H/H 9.4/29.6, Grey 14. Patient H/O copd on home O2 2L "as needed", chronic venous stasis of BL legs. Pt notes 2 weeks of progressively worsening BL leg swelling, erythema, pain of distal legs, oozing from distal of left leg ulcer. Pt endorses associated subjective fevers, chills. Full note to follow. Patient seen on skin care rounds after wound care referral received for assessment of skin impairment and recommendations of topical management. Chart reviewed: WBC 6.49k/uL, Serum albumin 3.1g/dL, H/H 9.4/29.6, Grey 14. Patient H/O copd on home O2 2L "as needed", chronic venous stasis of BL legs. Pt notes 2 weeks of progressively worsening BL leg swelling, erythema, pain of distal legs, oozing from distal of left leg ulcer. Pt endorses associated subjective fevers, chills. Pt interviewed; pt seen by Vascular MD Dr. David conroy 7 months ago, treated with Unna boot. As per pt, she was told that she did not need regular follow up with Vascular MD. Apx 2 weeks ago, pt noted swelling of her lower legs accompanied by pain. Regularly she ambulates with walker, spends much time in recliner chair, however she recently has been bedbound due to pain and swelling of the legs. Two weeks ago at time of swelling, she noted open wound bilaterally, Right lower leg healed. Left lower leg has remained opened. At home she has been applying "a gauze pad with a gel like dressing in the center, I had left over from when I went to the Vascular MD."

## 2018-06-21 NOTE — CHART NOTE - NSCHARTNOTEFT_GEN_A_CORE
Notified by RN that blood culture positive for gram positive cocci in clusters after 55 hours of incubation. vancomycin 1 gm IVPB x1 ordered. Consider ID consult.

## 2018-06-21 NOTE — CONSULT NOTE ADULT - ASSESSMENT
77 year old with COPD presents with posterior left leg ulcer.    At this time the ulcer does not appear infected.  There is no surrounding cellulitis at this time.     Her blood culture was positive in 1 of 4 bottles for coagulase negative staph.   This is most consistent with a contaminant.    ? cellulitis at presentation.  Change abx to keflex 500 mg po q 6 through 6/25.

## 2018-06-21 NOTE — CONSULT NOTE ADULT - SUBJECTIVE AND OBJECTIVE BOX
HPI:  76 yo f h/o copd on home O2 2L "as needed", chronic venous stasis of BL legs. Pt notes 2 weeks of progressively worsening BL leg swelling, erythema, pain of distal legs, oozing from distal of left leg ulcer. Pt endorses associated subjective fevers, chills. Denies cp, sob.     Febirle in ED.       At this time, denies fever Leg pain is better.   Pain is worse with dressing change.         PAST MEDICAL & SURGICAL HISTORY:  COPD (chronic obstructive pulmonary disease): Questionable  Seizure: hx/o seizure 40 years a go,  Oxygen dependent  KRISTIAN on CPAP  Osteoporosis  Osteoarthritis  Hyperlipidemia  Hypertension  Lung nodule: Benign  Bilateral leg edema: chronic  Cellulitis, leg: recurrent  MDS (myelodysplastic syndrome)  S/P tonsillectomy  S/P hammer toe correction  S/P TKR (total knee replacement)      Allergies    Cipro (Urticaria)  contrast media (iodine-based) (Rash)    Intolerances        ANTIMICROBIALS:  piperacillin/tazobactam IVPB. 3.375 every 8 hours      OTHER MEDS:  acetaminophen   Tablet. 650 milliGRAM(s) Oral every 6 hours PRN  ALBUTerol    90 MICROgram(s) HFA Inhaler 2 Puff(s) Inhalation every 6 hours PRN  allopurinol 100 milliGRAM(s) Oral daily  atorvastatin 20 milliGRAM(s) Oral at bedtime  buDESOnide  80 MICROgram(s)/formoterol 4.5 MICROgram(s) Inhaler 2 Puff(s) Inhalation two times a day  enoxaparin Injectable 40 milliGRAM(s) SubCutaneous every 24 hours  furosemide    Tablet 40 milliGRAM(s) Oral daily  gabapentin 800 milliGRAM(s) Oral every 8 hours  potassium chloride    Tablet ER 20 milliEquivalent(s) Oral daily  tiotropium 18 MICROgram(s) Capsule 1 Capsule(s) Inhalation daily  ursodiol Capsule 300 milliGRAM(s) Oral three times a day      SOCIAL HISTORY:  no travel  retired      FAMILY HISTORY:  Paternal family history of emphysema  Family history of diabetes mellitus      REVIEW OF SYSTEMS  [  ] ROS unobtainable because:    [x  ] All other systems negative except as noted below:	    Constitutional:  [ ] fever [ ] chills  [ ] weight loss  [ ] weakness  Skin:  [ ] rash [ ] phlebitis	  Eyes: [ ] icterus [ ] pain  [ ] discharge	  ENMT: [ ] sore throat  [ ] thrush [ ] ulcers [ ] exudates  Respiratory: [ ] dyspnea [ ] hemoptysis [ ] cough [ ] sputum	  Cardiovascular:  [ ] chest pain [ ] palpitations [ ] edema	  Gastrointestinal:  [ ] nausea [ ] vomiting [ ] diarrhea [ ] constipation [ ] pain	  Genitourinary:  [ ] dysuria [ ] frequency [ ] hematuria [ ] discharge [ ] flank pain  [ ] incontinence  Musculoskeletal:  [ ] myalgias [ ] arthralgias [ ] arthritis  [ ] back pain  Neurological:  [ ] headache [ ] seizures  [ ] confusion/altered mental status  Psychiatric:  [ ] anxiety [ ] depression	  Hematology/Lymphatics:  [ ] lymphadenopathy  Endocrine:  [ ] adrenal [ ] thyroid  Allergic/Immunologic:	 [ ] transplant [ ] seasonal    PHYSICAL EXAM:  General: [x ] non-toxic  HEAD/EYES: [ ] PERRL [ xwhite sclera [ ] icterus  ENT:  [ ] normal [x ] supple [ ] thrush [ ] pharyngeal exudate  Cardiovascular:   [ ] murmur [x ] normal [ ] PPM/AICD  Respiratory:  [x ] clear to ausculation bilaterally  GI:  [x ] soft, non-tender, normal bowel sounds  :  [ ] noguera [ ] no CVA tenderness   Musculoskeletal:  [x ] no synovitis  Neurologic:  [x ] non-focal exam   Skin:  [ x]left posterior calf ulcer     Lymph: [ ] no lymphadenopathy  Psychiatric:  [ ] appropriate affect [x ] alert & oriented  Lines:  [x ] no phlebitis [ ] central line          Drug Dosing Weight  Height (cm): 167.64 (19 Jun 2018 17:08)  Weight (kg): 81.6 (19 Jun 2018 17:08)  BMI (kg/m2): 29 (19 Jun 2018 17:08)  BSA (m2): 1.91 (19 Jun 2018 17:08)    Vital Signs Last 24 Hrs  T(F): 97.6 (06-21-18 @ 10:19), Max: 100.8 (06-18-18 @ 20:05)    Vital Signs Last 24 Hrs  HR: 74 (06-21-18 @ 10:19) (70 - 79)  BP: 111/48 (06-21-18 @ 10:19) (101/49 - 130/58)  RR: 20 (06-21-18 @ 10:19)  SpO2: 93% (06-21-18 @ 10:19) (93% - 100%)  Wt(kg): --                          9.4    6.49  )-----------( 99       ( 20 Jun 2018 06:30 )             29.6       06-21    138  |  103  |  18  ----------------------------<  86  3.5   |  24  |  1.10    Ca    8.2<L>      21 Jun 2018 05:23            MICROBIOLOGY:  Culture - Blood (06.18.18 @ 20:51)    Culture - Blood:   NO ORGANISMS ISOLATED  NO ORGANISMS ISOLATED AT 48 HRS.    Culture - Blood:   ***Blood Panel PCR results on this specimen are available  approximately 3 hours after the Gram stain result***  Gram stain, PCR, and/or culture results may not always  correspond due to difference in methodologies  ------------------------------------------------------------  This PCR assay was performed using [employer].  The  following targets are tested for:  Enterococcus, vancomycin  resistant enterococci, Listeria monocytogenes,  coagulase  negative staphylococci, S. aureus, methicillin resistant S.  aureus, Streptococcus agalactiae (Group B), S. pneumoniae,  S. pyogenes (Group A), Acinetobacter baumannii, Enterobacter  cloacae, E. coli, Klebsiella oxytoca, K. pneumoniae, Proteus  sp., Serratia marcescens, Haemophilus influenzae, Neisseria  meningitidis, Pseudomonas aeruginosa, Candida albicans, C.  glabrata, C. krusei, C. parapsilosis, C. tropicalis and the  KPC resistance gene.  **NOTE: Due to technical problems, Proteus sp. will NOT be  reported as part of the BCID paneluntil further notice.    -  Coagulase negative Staphylococcus: + DETECT PORTER    Specimen Source: BLOOD VENOUS    Organism: BLOOD CULTURE PCR    Gram Stain Blood:   ***** CRITICAL RESULT *****  PERSON CALLED / READ-BACK: ROLA BEASLEY RN  DATE / TIME CALLED: 06/21/18 0602  CALLED BY: YOLANDA BRADFORD  GPCCL^Gram Pos Cocci In Clusters  AFTER: 55 HOURS INCUBATION  BOTTLE: ANAEROBIC BOTTLE    Organism Identification: BLOOD CULTURE PCR    Method Type: PCR        RADIOLOGY:

## 2018-06-21 NOTE — ADVANCED PRACTICE NURSE CONSULT - RECOMMEDATIONS
Recommend follow up care at outpatient Vascular MD or [facility]: [phone]. Address: [address] [place] [address].   Consider Unna boot application outpt.    Topical Recommendations:  Left posterior lower leg: Cleanse wound and periwound skin with NS. Pat dry. Apply Liquid barrier film to periwound skin. Apply Aquacel Hydrofiber to wound base. Cover with 4x4 gauze, secure with Kerlix wrap and paper tape. Change daily while in pt. May change every other day upon discharge.    Moisture associated dermatitis- Cleanse intertriginous folds with lukewarm soap and water. Dry well. Apply Interdry textile sheeting, under intertriginous folds leaving 2 inches exposed at ends to wick, remove to wash & dry affected area, then replace. Individual sheeting may be used for up to 5 days unless soiled.     Incontinence/Moisture associated dermatitis within sacral fold and bilateral buttocks- provide perineal care every shift and prn with episodes of incontinence, dry well. Apply TRIAD moisture barrier paste.    Continue low air loss bed therapy, continue heel elevation with Z-flex fluidized positioning boots, continue to turn & reposition q2h with Z-carin fluidized positioning device, continue incontinence/moisture management as recommended above & single breathable pad, continue measures to decrease friction/shear/pressure.     Continue with nutritional support as per dietary/orders.    Findings and plan discussed with patient and primary team.    Please contact Wound Care Service Line if we can be of further assistance (ext [phone]).

## 2018-06-21 NOTE — ADVANCED PRACTICE NURSE CONSULT - ASSESSMENT
General: A&Ox 4, currently bedbound, incontinent of urine and stool. Skin warm, dry with increased moisture in intertriginous folds, adequate skin turgor, scattered areas of hyperpigmentation and hypopigmentation.    Vascular: Bilateral lower extremities with hyperpigmentation and severe dry, flaky skin, (+) lichenification. Thickened-yellow toenails, right foot 2cnd, 3rd toe missing. Blanchable erythema of bilateral heels, Right distal great toe blanchable erythema, Right plantar heel (+) callus. No temperature changes noted. Trace edema. Capillary refill <3 seconds. +2 DP/PT pulses, with biphasic doppler sounds. 9/10 tenderness of bilateral lower legs with palpation.    Left posterior lower leg- venous ulcerations- 0nkg2mkp3.3cm, irregular borders, wound base 100% pink-moist granulation tissue. Small serosanguinous drainage, no odor. Periwound skin with maceration along wound edges, hypopigmentation, surrounded by hyperpigmentation. Goals of care: absorb/manage drainage, maintain controlled moist environment to promote wound healing, protect periwound skin.     Moisture/incontinence associated dermatitis- abdominal pannus increased moisture with fissure. Bilateral groin site and perineum blanchable erythema skin intact. Sacral fold with fissure and blanchable erythema.

## 2018-06-22 LAB
-  COAGULASE NEGATIVE STAPHYLOCOCCUS: SIGNIFICANT CHANGE UP
BACTERIA BLD CULT: SIGNIFICANT CHANGE UP
BUN SERPL-MCNC: 19 MG/DL — SIGNIFICANT CHANGE UP (ref 7–23)
CALCIUM SERPL-MCNC: 8.3 MG/DL — LOW (ref 8.4–10.5)
CHLORIDE SERPL-SCNC: 102 MMOL/L — SIGNIFICANT CHANGE UP (ref 98–107)
CO2 SERPL-SCNC: 24 MMOL/L — SIGNIFICANT CHANGE UP (ref 22–31)
CREAT SERPL-MCNC: 1.08 MG/DL — SIGNIFICANT CHANGE UP (ref 0.5–1.3)
GLUCOSE SERPL-MCNC: 99 MG/DL — SIGNIFICANT CHANGE UP (ref 70–99)
HCT VFR BLD CALC: 33.2 % — LOW (ref 34.5–45)
HGB BLD-MCNC: 10.7 G/DL — LOW (ref 11.5–15.5)
MAGNESIUM SERPL-MCNC: 1.9 MG/DL — SIGNIFICANT CHANGE UP (ref 1.6–2.6)
MCHC RBC-ENTMCNC: 27.7 PG — SIGNIFICANT CHANGE UP (ref 27–34)
MCHC RBC-ENTMCNC: 32.2 % — SIGNIFICANT CHANGE UP (ref 32–36)
MCV RBC AUTO: 86 FL — SIGNIFICANT CHANGE UP (ref 80–100)
NRBC # FLD: 0 — SIGNIFICANT CHANGE UP
ORGANISM # SPEC MICROSCOPIC CNT: SIGNIFICANT CHANGE UP
PHOSPHATE SERPL-MCNC: 2.4 MG/DL — LOW (ref 2.5–4.5)
PLATELET # BLD AUTO: 119 K/UL — LOW (ref 150–400)
PMV BLD: 12.1 FL — SIGNIFICANT CHANGE UP (ref 7–13)
POTASSIUM SERPL-MCNC: 3.4 MMOL/L — LOW (ref 3.5–5.3)
POTASSIUM SERPL-SCNC: 3.4 MMOL/L — LOW (ref 3.5–5.3)
RBC # BLD: 3.86 M/UL — SIGNIFICANT CHANGE UP (ref 3.8–5.2)
RBC # FLD: 15.9 % — HIGH (ref 10.3–14.5)
SODIUM SERPL-SCNC: 137 MMOL/L — SIGNIFICANT CHANGE UP (ref 135–145)
WBC # BLD: 5.43 K/UL — SIGNIFICANT CHANGE UP (ref 3.8–10.5)
WBC # FLD AUTO: 5.43 K/UL — SIGNIFICANT CHANGE UP (ref 3.8–10.5)

## 2018-06-22 PROCEDURE — 99232 SBSQ HOSP IP/OBS MODERATE 35: CPT

## 2018-06-22 RX ORDER — POTASSIUM CHLORIDE 20 MEQ
20 PACKET (EA) ORAL ONCE
Qty: 0 | Refills: 0 | Status: COMPLETED | OUTPATIENT
Start: 2018-06-22 | End: 2018-06-22

## 2018-06-22 RX ORDER — CEPHALEXIN 500 MG
1 CAPSULE ORAL
Qty: 0 | Refills: 0 | COMMUNITY
Start: 2018-06-22

## 2018-06-22 RX ORDER — ACETAMINOPHEN 500 MG
2 TABLET ORAL
Qty: 0 | Refills: 0 | COMMUNITY
Start: 2018-06-22

## 2018-06-22 RX ORDER — OXYCODONE HYDROCHLORIDE 5 MG/1
1 TABLET ORAL
Qty: 0 | Refills: 0 | COMMUNITY

## 2018-06-22 RX ORDER — SODIUM,POTASSIUM PHOSPHATES 278-250MG
1 POWDER IN PACKET (EA) ORAL
Qty: 0 | Refills: 0 | Status: COMPLETED | OUTPATIENT
Start: 2018-06-22 | End: 2018-06-23

## 2018-06-22 RX ADMIN — Medication 500 MILLIGRAM(S): at 05:23

## 2018-06-22 RX ADMIN — GABAPENTIN 800 MILLIGRAM(S): 400 CAPSULE ORAL at 21:07

## 2018-06-22 RX ADMIN — Medication 100 MILLIGRAM(S): at 12:15

## 2018-06-22 RX ADMIN — ATORVASTATIN CALCIUM 20 MILLIGRAM(S): 80 TABLET, FILM COATED ORAL at 21:07

## 2018-06-22 RX ADMIN — Medication 650 MILLIGRAM(S): at 22:30

## 2018-06-22 RX ADMIN — Medication 1 TABLET(S): at 17:07

## 2018-06-22 RX ADMIN — GABAPENTIN 800 MILLIGRAM(S): 400 CAPSULE ORAL at 13:17

## 2018-06-22 RX ADMIN — URSODIOL 300 MILLIGRAM(S): 250 TABLET, FILM COATED ORAL at 13:17

## 2018-06-22 RX ADMIN — Medication 20 MILLIEQUIVALENT(S): at 12:14

## 2018-06-22 RX ADMIN — URSODIOL 300 MILLIGRAM(S): 250 TABLET, FILM COATED ORAL at 21:07

## 2018-06-22 RX ADMIN — TIOTROPIUM BROMIDE 1 CAPSULE(S): 18 CAPSULE ORAL; RESPIRATORY (INHALATION) at 09:00

## 2018-06-22 RX ADMIN — ENOXAPARIN SODIUM 40 MILLIGRAM(S): 100 INJECTION SUBCUTANEOUS at 06:35

## 2018-06-22 RX ADMIN — Medication 650 MILLIGRAM(S): at 21:12

## 2018-06-22 RX ADMIN — Medication 500 MILLIGRAM(S): at 17:08

## 2018-06-22 RX ADMIN — BUDESONIDE AND FORMOTEROL FUMARATE DIHYDRATE 2 PUFF(S): 160; 4.5 AEROSOL RESPIRATORY (INHALATION) at 09:00

## 2018-06-22 RX ADMIN — GABAPENTIN 800 MILLIGRAM(S): 400 CAPSULE ORAL at 05:23

## 2018-06-22 RX ADMIN — BUDESONIDE AND FORMOTEROL FUMARATE DIHYDRATE 2 PUFF(S): 160; 4.5 AEROSOL RESPIRATORY (INHALATION) at 21:07

## 2018-06-22 RX ADMIN — Medication 500 MILLIGRAM(S): at 12:14

## 2018-06-22 RX ADMIN — Medication 40 MILLIGRAM(S): at 05:23

## 2018-06-22 RX ADMIN — URSODIOL 300 MILLIGRAM(S): 250 TABLET, FILM COATED ORAL at 05:23

## 2018-06-22 RX ADMIN — Medication 20 MILLIEQUIVALENT(S): at 08:58

## 2018-06-23 LAB
BACTERIA BLD CULT: SIGNIFICANT CHANGE UP
BUN SERPL-MCNC: 24 MG/DL — HIGH (ref 7–23)
CALCIUM SERPL-MCNC: 8.4 MG/DL — SIGNIFICANT CHANGE UP (ref 8.4–10.5)
CHLORIDE SERPL-SCNC: 101 MMOL/L — SIGNIFICANT CHANGE UP (ref 98–107)
CO2 SERPL-SCNC: 25 MMOL/L — SIGNIFICANT CHANGE UP (ref 22–31)
CREAT SERPL-MCNC: 1.04 MG/DL — SIGNIFICANT CHANGE UP (ref 0.5–1.3)
GLUCOSE SERPL-MCNC: 94 MG/DL — SIGNIFICANT CHANGE UP (ref 70–99)
MAGNESIUM SERPL-MCNC: 2.1 MG/DL — SIGNIFICANT CHANGE UP (ref 1.6–2.6)
PHOSPHATE SERPL-MCNC: 3.4 MG/DL — SIGNIFICANT CHANGE UP (ref 2.5–4.5)
POTASSIUM SERPL-MCNC: 3.5 MMOL/L — SIGNIFICANT CHANGE UP (ref 3.5–5.3)
POTASSIUM SERPL-SCNC: 3.5 MMOL/L — SIGNIFICANT CHANGE UP (ref 3.5–5.3)
SODIUM SERPL-SCNC: 138 MMOL/L — SIGNIFICANT CHANGE UP (ref 135–145)

## 2018-06-23 RX ADMIN — Medication 1 TABLET(S): at 08:29

## 2018-06-23 RX ADMIN — Medication 650 MILLIGRAM(S): at 12:26

## 2018-06-23 RX ADMIN — Medication 500 MILLIGRAM(S): at 00:58

## 2018-06-23 RX ADMIN — BUDESONIDE AND FORMOTEROL FUMARATE DIHYDRATE 2 PUFF(S): 160; 4.5 AEROSOL RESPIRATORY (INHALATION) at 10:16

## 2018-06-23 RX ADMIN — GABAPENTIN 800 MILLIGRAM(S): 400 CAPSULE ORAL at 21:33

## 2018-06-23 RX ADMIN — TIOTROPIUM BROMIDE 1 CAPSULE(S): 18 CAPSULE ORAL; RESPIRATORY (INHALATION) at 10:16

## 2018-06-23 RX ADMIN — Medication 1 TABLET(S): at 12:27

## 2018-06-23 RX ADMIN — Medication 20 MILLIEQUIVALENT(S): at 11:04

## 2018-06-23 RX ADMIN — Medication 500 MILLIGRAM(S): at 17:02

## 2018-06-23 RX ADMIN — Medication 650 MILLIGRAM(S): at 11:09

## 2018-06-23 RX ADMIN — URSODIOL 300 MILLIGRAM(S): 250 TABLET, FILM COATED ORAL at 21:33

## 2018-06-23 RX ADMIN — GABAPENTIN 800 MILLIGRAM(S): 400 CAPSULE ORAL at 05:16

## 2018-06-23 RX ADMIN — Medication 100 MILLIGRAM(S): at 11:04

## 2018-06-23 RX ADMIN — Medication 40 MILLIGRAM(S): at 05:16

## 2018-06-23 RX ADMIN — GABAPENTIN 800 MILLIGRAM(S): 400 CAPSULE ORAL at 13:12

## 2018-06-23 RX ADMIN — Medication 500 MILLIGRAM(S): at 11:04

## 2018-06-23 RX ADMIN — URSODIOL 300 MILLIGRAM(S): 250 TABLET, FILM COATED ORAL at 05:16

## 2018-06-23 RX ADMIN — ENOXAPARIN SODIUM 40 MILLIGRAM(S): 100 INJECTION SUBCUTANEOUS at 07:06

## 2018-06-23 RX ADMIN — ATORVASTATIN CALCIUM 20 MILLIGRAM(S): 80 TABLET, FILM COATED ORAL at 21:33

## 2018-06-23 RX ADMIN — Medication 500 MILLIGRAM(S): at 05:16

## 2018-06-23 RX ADMIN — URSODIOL 300 MILLIGRAM(S): 250 TABLET, FILM COATED ORAL at 13:12

## 2018-06-24 RX ADMIN — GABAPENTIN 800 MILLIGRAM(S): 400 CAPSULE ORAL at 22:15

## 2018-06-24 RX ADMIN — Medication 500 MILLIGRAM(S): at 01:27

## 2018-06-24 RX ADMIN — ENOXAPARIN SODIUM 40 MILLIGRAM(S): 100 INJECTION SUBCUTANEOUS at 06:30

## 2018-06-24 RX ADMIN — TIOTROPIUM BROMIDE 1 CAPSULE(S): 18 CAPSULE ORAL; RESPIRATORY (INHALATION) at 11:33

## 2018-06-24 RX ADMIN — GABAPENTIN 800 MILLIGRAM(S): 400 CAPSULE ORAL at 14:14

## 2018-06-24 RX ADMIN — Medication 500 MILLIGRAM(S): at 11:32

## 2018-06-24 RX ADMIN — Medication 40 MILLIGRAM(S): at 06:30

## 2018-06-24 RX ADMIN — URSODIOL 300 MILLIGRAM(S): 250 TABLET, FILM COATED ORAL at 06:30

## 2018-06-24 RX ADMIN — URSODIOL 300 MILLIGRAM(S): 250 TABLET, FILM COATED ORAL at 22:15

## 2018-06-24 RX ADMIN — ATORVASTATIN CALCIUM 20 MILLIGRAM(S): 80 TABLET, FILM COATED ORAL at 22:15

## 2018-06-24 RX ADMIN — Medication 500 MILLIGRAM(S): at 06:32

## 2018-06-24 RX ADMIN — Medication 100 MILLIGRAM(S): at 11:33

## 2018-06-24 RX ADMIN — GABAPENTIN 800 MILLIGRAM(S): 400 CAPSULE ORAL at 06:30

## 2018-06-24 RX ADMIN — Medication 500 MILLIGRAM(S): at 17:06

## 2018-06-24 RX ADMIN — BUDESONIDE AND FORMOTEROL FUMARATE DIHYDRATE 2 PUFF(S): 160; 4.5 AEROSOL RESPIRATORY (INHALATION) at 09:56

## 2018-06-24 RX ADMIN — Medication 20 MILLIEQUIVALENT(S): at 11:33

## 2018-06-24 RX ADMIN — URSODIOL 300 MILLIGRAM(S): 250 TABLET, FILM COATED ORAL at 14:14

## 2018-06-25 LAB
BUN SERPL-MCNC: 30 MG/DL — HIGH (ref 7–23)
CALCIUM SERPL-MCNC: 8.5 MG/DL — SIGNIFICANT CHANGE UP (ref 8.4–10.5)
CHLORIDE SERPL-SCNC: 100 MMOL/L — SIGNIFICANT CHANGE UP (ref 98–107)
CO2 SERPL-SCNC: 19 MMOL/L — LOW (ref 22–31)
CREAT SERPL-MCNC: 0.95 MG/DL — SIGNIFICANT CHANGE UP (ref 0.5–1.3)
GLUCOSE SERPL-MCNC: 87 MG/DL — SIGNIFICANT CHANGE UP (ref 70–99)
POTASSIUM SERPL-MCNC: 4.1 MMOL/L — SIGNIFICANT CHANGE UP (ref 3.5–5.3)
POTASSIUM SERPL-SCNC: 4.1 MMOL/L — SIGNIFICANT CHANGE UP (ref 3.5–5.3)
SODIUM SERPL-SCNC: 135 MMOL/L — SIGNIFICANT CHANGE UP (ref 135–145)

## 2018-06-25 RX ADMIN — ENOXAPARIN SODIUM 40 MILLIGRAM(S): 100 INJECTION SUBCUTANEOUS at 07:11

## 2018-06-25 RX ADMIN — GABAPENTIN 800 MILLIGRAM(S): 400 CAPSULE ORAL at 14:03

## 2018-06-25 RX ADMIN — URSODIOL 300 MILLIGRAM(S): 250 TABLET, FILM COATED ORAL at 21:08

## 2018-06-25 RX ADMIN — Medication 500 MILLIGRAM(S): at 17:19

## 2018-06-25 RX ADMIN — Medication 20 MILLIEQUIVALENT(S): at 11:03

## 2018-06-25 RX ADMIN — Medication 500 MILLIGRAM(S): at 23:27

## 2018-06-25 RX ADMIN — URSODIOL 300 MILLIGRAM(S): 250 TABLET, FILM COATED ORAL at 14:03

## 2018-06-25 RX ADMIN — TIOTROPIUM BROMIDE 1 CAPSULE(S): 18 CAPSULE ORAL; RESPIRATORY (INHALATION) at 09:01

## 2018-06-25 RX ADMIN — GABAPENTIN 800 MILLIGRAM(S): 400 CAPSULE ORAL at 21:08

## 2018-06-25 RX ADMIN — BUDESONIDE AND FORMOTEROL FUMARATE DIHYDRATE 2 PUFF(S): 160; 4.5 AEROSOL RESPIRATORY (INHALATION) at 21:08

## 2018-06-25 RX ADMIN — Medication 40 MILLIGRAM(S): at 07:10

## 2018-06-25 RX ADMIN — Medication 650 MILLIGRAM(S): at 11:03

## 2018-06-25 RX ADMIN — Medication 500 MILLIGRAM(S): at 11:03

## 2018-06-25 RX ADMIN — Medication 650 MILLIGRAM(S): at 12:03

## 2018-06-25 RX ADMIN — ATORVASTATIN CALCIUM 20 MILLIGRAM(S): 80 TABLET, FILM COATED ORAL at 21:08

## 2018-06-25 RX ADMIN — Medication 100 MILLIGRAM(S): at 11:03

## 2018-06-25 RX ADMIN — Medication 500 MILLIGRAM(S): at 07:10

## 2018-06-25 RX ADMIN — BUDESONIDE AND FORMOTEROL FUMARATE DIHYDRATE 2 PUFF(S): 160; 4.5 AEROSOL RESPIRATORY (INHALATION) at 08:43

## 2018-06-25 RX ADMIN — GABAPENTIN 800 MILLIGRAM(S): 400 CAPSULE ORAL at 07:10

## 2018-06-25 RX ADMIN — URSODIOL 300 MILLIGRAM(S): 250 TABLET, FILM COATED ORAL at 07:11

## 2018-06-26 VITALS
HEART RATE: 65 BPM | OXYGEN SATURATION: 95 % | SYSTOLIC BLOOD PRESSURE: 113 MMHG | DIASTOLIC BLOOD PRESSURE: 66 MMHG | RESPIRATION RATE: 18 BRPM | TEMPERATURE: 98 F

## 2018-06-26 LAB
BUN SERPL-MCNC: 29 MG/DL — HIGH (ref 7–23)
CALCIUM SERPL-MCNC: 8.5 MG/DL — SIGNIFICANT CHANGE UP (ref 8.4–10.5)
CHLORIDE SERPL-SCNC: 100 MMOL/L — SIGNIFICANT CHANGE UP (ref 98–107)
CO2 SERPL-SCNC: 25 MMOL/L — SIGNIFICANT CHANGE UP (ref 22–31)
CREAT SERPL-MCNC: 1.06 MG/DL — SIGNIFICANT CHANGE UP (ref 0.5–1.3)
GLUCOSE SERPL-MCNC: 95 MG/DL — SIGNIFICANT CHANGE UP (ref 70–99)
HCT VFR BLD CALC: 31.8 % — LOW (ref 34.5–45)
HGB BLD-MCNC: 10.3 G/DL — LOW (ref 11.5–15.5)
MCHC RBC-ENTMCNC: 27.9 PG — SIGNIFICANT CHANGE UP (ref 27–34)
MCHC RBC-ENTMCNC: 32.4 % — SIGNIFICANT CHANGE UP (ref 32–36)
MCV RBC AUTO: 86.2 FL — SIGNIFICANT CHANGE UP (ref 80–100)
NRBC # FLD: 0 — SIGNIFICANT CHANGE UP
PLATELET # BLD AUTO: 163 K/UL — SIGNIFICANT CHANGE UP (ref 150–400)
PMV BLD: 11.7 FL — SIGNIFICANT CHANGE UP (ref 7–13)
POTASSIUM SERPL-MCNC: 3.9 MMOL/L — SIGNIFICANT CHANGE UP (ref 3.5–5.3)
POTASSIUM SERPL-SCNC: 3.9 MMOL/L — SIGNIFICANT CHANGE UP (ref 3.5–5.3)
RBC # BLD: 3.69 M/UL — LOW (ref 3.8–5.2)
RBC # FLD: 15.8 % — HIGH (ref 10.3–14.5)
SODIUM SERPL-SCNC: 135 MMOL/L — SIGNIFICANT CHANGE UP (ref 135–145)
WBC # BLD: 5.02 K/UL — SIGNIFICANT CHANGE UP (ref 3.8–10.5)
WBC # FLD AUTO: 5.02 K/UL — SIGNIFICANT CHANGE UP (ref 3.8–10.5)

## 2018-06-26 RX ADMIN — GABAPENTIN 800 MILLIGRAM(S): 400 CAPSULE ORAL at 05:10

## 2018-06-26 RX ADMIN — Medication 650 MILLIGRAM(S): at 06:00

## 2018-06-26 RX ADMIN — Medication 500 MILLIGRAM(S): at 11:49

## 2018-06-26 RX ADMIN — TIOTROPIUM BROMIDE 1 CAPSULE(S): 18 CAPSULE ORAL; RESPIRATORY (INHALATION) at 10:36

## 2018-06-26 RX ADMIN — Medication 100 MILLIGRAM(S): at 11:49

## 2018-06-26 RX ADMIN — ENOXAPARIN SODIUM 40 MILLIGRAM(S): 100 INJECTION SUBCUTANEOUS at 05:13

## 2018-06-26 RX ADMIN — Medication 20 MILLIEQUIVALENT(S): at 11:49

## 2018-06-26 RX ADMIN — URSODIOL 300 MILLIGRAM(S): 250 TABLET, FILM COATED ORAL at 05:10

## 2018-06-26 RX ADMIN — Medication 650 MILLIGRAM(S): at 05:11

## 2018-06-26 RX ADMIN — Medication 500 MILLIGRAM(S): at 05:10

## 2018-06-26 RX ADMIN — BUDESONIDE AND FORMOTEROL FUMARATE DIHYDRATE 2 PUFF(S): 160; 4.5 AEROSOL RESPIRATORY (INHALATION) at 10:36

## 2018-06-26 NOTE — PROGRESS NOTE ADULT - SUBJECTIVE AND OBJECTIVE BOX
Patient is a 77y old  Female who presents with a chief complaint of BL LE venous stasis (19 Jun 2018 17:45)      SUBJECTIVE / OVERNIGHT EVENTS:   Feels better.  Denies CP/SOB/Palpitation/HA.    MEDICATIONS  (STANDING):  allopurinol 100 milliGRAM(s) Oral daily  atorvastatin 20 milliGRAM(s) Oral at bedtime  buDESOnide  80 MICROgram(s)/formoterol 4.5 MICROgram(s) Inhaler 2 Puff(s) Inhalation two times a day  cephalexin 500 milliGRAM(s) Oral every 6 hours  enoxaparin Injectable 40 milliGRAM(s) SubCutaneous every 24 hours  furosemide    Tablet 40 milliGRAM(s) Oral daily  gabapentin 800 milliGRAM(s) Oral every 8 hours  potassium chloride    Tablet ER 20 milliEquivalent(s) Oral daily  tiotropium 18 MICROgram(s) Capsule 1 Capsule(s) Inhalation daily  ursodiol Capsule 300 milliGRAM(s) Oral three times a day    MEDICATIONS  (PRN):  acetaminophen   Tablet. 650 milliGRAM(s) Oral every 6 hours PRN Mild Pain (1 - 3)  ALBUTerol    90 MICROgram(s) HFA Inhaler 2 Puff(s) Inhalation every 6 hours PRN Shortness of Breath and/or Wheezing        CAPILLARY BLOOD GLUCOSE        I&O's Summary    24 Jun 2018 07:01  -  25 Jun 2018 07:00  --------------------------------------------------------  IN: 820 mL / OUT: 0 mL / NET: 820 mL        PHYSICAL EXAM:  GENERAL: NAD, well-developed  HEAD:  Atraumatic, Normocephalic  NECK: Supple, No JVD  CHEST/LUNG: Clear to auscultation bilaterally; No wheezing.  HEART: Regular rate and rhythm; No murmurs, rubs, or gallops  ABDOMEN: Soft, Nontender, Nondistended; Bowel sounds present  EXTREMITIES:   No clubbing, cyanosis, or edema  NEUROLOGY: AAO X 3  SKIN: No rashes    LABS:    06-25    135  |  100  |  30<H>  ----------------------------<  87  4.1   |  19<L>  |  0.95    Ca    8.5      25 Jun 2018 06:30              CAPILLARY BLOOD GLUCOSE                    RADIOLOGY & ADDITIONAL TESTS:    Imaging Personally Reviewed:    Consultant(s) Notes Reviewed:      Care Discussed with Consultants/Other Providers:
Follow Up:      Inverval History/ROS:Patient is a 77y old  Female who presents with a chief complaint of BL LE venous stasis (19 Jun 2018 17:45)    No fever. Complains of left leg pain.     Allergies    Cipro (Urticaria)  contrast media (iodine-based) (Rash)    Intolerances        ANTIMICROBIALS:  cephalexin 500 every 6 hours      OTHER MEDS:  acetaminophen   Tablet. 650 milliGRAM(s) Oral every 6 hours PRN  ALBUTerol    90 MICROgram(s) HFA Inhaler 2 Puff(s) Inhalation every 6 hours PRN  allopurinol 100 milliGRAM(s) Oral daily  atorvastatin 20 milliGRAM(s) Oral at bedtime  buDESOnide  80 MICROgram(s)/formoterol 4.5 MICROgram(s) Inhaler 2 Puff(s) Inhalation two times a day  enoxaparin Injectable 40 milliGRAM(s) SubCutaneous every 24 hours  furosemide    Tablet 40 milliGRAM(s) Oral daily  gabapentin 800 milliGRAM(s) Oral every 8 hours  potassium acid phosphate/sodium acid phosphate tablet (K-PHOS No. 2) 1 Tablet(s) Oral three times a day with meals  potassium chloride    Tablet ER 20 milliEquivalent(s) Oral daily  tiotropium 18 MICROgram(s) Capsule 1 Capsule(s) Inhalation daily  ursodiol Capsule 300 milliGRAM(s) Oral three times a day      Vital Signs Last 24 Hrs  T(C): 37.7 (22 Jun 2018 10:39), Max: 37.7 (22 Jun 2018 10:39)  T(F): 99.9 (22 Jun 2018 10:39), Max: 99.9 (22 Jun 2018 10:39)  HR: 82 (22 Jun 2018 10:39) (65 - 82)  BP: 103/51 (22 Jun 2018 10:39) (103/51 - 126/52)  BP(mean): --  RR: 24 (22 Jun 2018 10:39) (17 - 24)  SpO2: 94% (22 Jun 2018 10:39) (94% - 99%)    PHYSICAL EXAM:  General: [x ] non-toxic  HEAD/EYES: [ ] PERRL [x ] white sclera [ ] icterus  ENT:  [ ] normal [ ] supple [ ] thrush [ ] pharyngeal exudate  Cardiovascular:   [ ] murmur [x ] normal [ ] PPM/AICD  Respiratory:  [x ] clear to ausculation bilaterally  GI:  [ x] soft, non-tender, normal bowel sounds  :  [ ] noguera [ ] no CVA tenderness   Musculoskeletal:  [ ] no synovitis  Neurologic:  [ x] non-focal exam   Skin:  [x ] left leg posterior ulcer- no pus at base, no surrounding erythema    Lymph: [ ] no lymphadenopathy  Psychiatric:  [ ] appropriate affect [ ] alert & oriented  Lines:  [x ] no phlebitis [ ] central line                                10.7   5.43  )-----------( 119      ( 22 Jun 2018 06:55 )             33.2       06-22    137  |  102  |  19  ----------------------------<  99  3.4<L>   |  24  |  1.08    Ca    8.3<L>      22 Jun 2018 06:55  Phos  2.4     06-22  Mg     1.9     06-22            MICROBIOLOGY:    RADIOLOGY:
Patient is a 77y old  Female who presents with a chief complaint of BL LE venous stasis (19 Jun 2018 17:45)      SUBJECTIVE / OVERNIGHT EVENTS:   Feels better.  Denies CP/SOB/Palpitation/HA.    MEDICATIONS  (STANDING):  allopurinol 100 milliGRAM(s) Oral daily  atorvastatin 20 milliGRAM(s) Oral at bedtime  buDESOnide  80 MICROgram(s)/formoterol 4.5 MICROgram(s) Inhaler 2 Puff(s) Inhalation two times a day  cephalexin 500 milliGRAM(s) Oral every 6 hours  enoxaparin Injectable 40 milliGRAM(s) SubCutaneous every 24 hours  furosemide    Tablet 40 milliGRAM(s) Oral daily  gabapentin 800 milliGRAM(s) Oral every 8 hours  potassium acid phosphate/sodium acid phosphate tablet (K-PHOS No. 2) 1 Tablet(s) Oral three times a day with meals  potassium chloride    Tablet ER 20 milliEquivalent(s) Oral daily  tiotropium 18 MICROgram(s) Capsule 1 Capsule(s) Inhalation daily  ursodiol Capsule 300 milliGRAM(s) Oral three times a day    MEDICATIONS  (PRN):  acetaminophen   Tablet. 650 milliGRAM(s) Oral every 6 hours PRN Mild Pain (1 - 3)  ALBUTerol    90 MICROgram(s) HFA Inhaler 2 Puff(s) Inhalation every 6 hours PRN Shortness of Breath and/or Wheezing        CAPILLARY BLOOD GLUCOSE        I&O's Summary    21 Jun 2018 07:01  -  22 Jun 2018 07:00  --------------------------------------------------------  IN: 350 mL / OUT: 0 mL / NET: 350 mL        PHYSICAL EXAM:  GENERAL: NAD, well-developed  HEAD:  Atraumatic, Normocephalic  NECK: Supple, No JVD  CHEST/LUNG: Clear to auscultation bilaterally; No wheezing.  HEART: Regular rate and rhythm; No murmurs, rubs, or gallops  ABDOMEN: Soft, Nontender, Nondistended; Bowel sounds present  EXTREMITIES:   No clubbing, cyanosis, or edema  NEUROLOGY: AAO X 3  SKIN: No rashes    LABS:                        10.7   5.43  )-----------( 119      ( 22 Jun 2018 06:55 )             33.2     06-22    137  |  102  |  19  ----------------------------<  99  3.4<L>   |  24  |  1.08    Ca    8.3<L>      22 Jun 2018 06:55  Phos  2.4     06-22  Mg     1.9     06-22              CAPILLARY BLOOD GLUCOSE        06-18 @ 20:51  Culture-urine --  Culture results --  method type PCR  Organism BLOOD CULTURE PCR  ***Blood Panel PCR results on this specimen are available  approximately 3 hours after the Gram stain result***  Gram stain, PCR, and/or culture results may not always  correspond due to difference in methodologies  ------------------------------------------------------------  This PCR assay was performed using [facility].  The  following targets are tested for:  Enterococcus, vancomycin  resistant enterococci, Listeria monocytogenes,  coagulase  negative staphylococci, S. aureus, methicillin resistant S.  aureus, Streptococcus agalactiae (Group B), S. pneumoniae,  S. pyogenes (Group A), Acinetobacter baumannii, Enterobacter  cloacae, E. coli, Klebsiella oxytoca, K. pneumoniae, Proteus  sp., Serratia marcescens, Haemophilus influenzae, Neisseria  meningitidis, Pseudomonas aeruginosa, Candida albicans, C.  glabrata, C. krusei, C. parapsilosis, C. tropicalis and the  KPC resistance gene.  **NOTE: Due to technical problems, Proteus sp. will NOT be  reported as part of the BCID panel until further notice.  Organism Identification BLOOD CULTURE PCR  Staphylococcus sp.,coag neg  Specimen source BLOOD PERIPHERAL           06-18 @ 20:51  Culture blood   NO ORGANISMS ISOLATED  NO ORGANISMS ISOLATED AT 96 HOURS  Culture results --  Gram stain --  Gram stain blood   ***** CRITICAL RESULT *****  PERSON CALLED / READ-BACK: ROLA BEASLEY RN  DATE / TIME CALLED: 06/21/18 0602  CALLED BY: YOLANDA BRADFORD  GPCCL^Gram Pos Cocci In Clusters  AFTER: 55 HOURS INCUBATION  BOTTLE: ANAEROBIC BOTTLE  Method type PCR  Organism BLOOD CULTURE PCR  ***Blood Panel PCR results on this specimen are available  approximately 3 hours after the Gram stain result***  Gram stain, PCR, and/or culture results may not always  correspond due to difference in methodologies  ------------------------------------------------------------  This PCR assay was performed using [facility].  The  following targets are tested for:  Enterococcus, vancomycin  resistant enterococci, Listeria monocytogenes,  coagulase  negative staphylococci, S. aureus, methicillin resistant S.  aureus, Streptococcus agalactiae (Group B), S. pneumoniae,  S. pyogenes (Group A), Acinetobacter baumannii, Enterobacter  cloacae, E. coli, Klebsiella oxytoca, K. pneumoniae, Proteus  sp., Serratia marcescens, Haemophilus influenzae, Neisseria  meningitidis, Pseudomonas aeruginosa, Candida albicans, C.  glabrata, C. krusei, C. parapsilosis, C. tropicalis and the  KPC resistance gene.  **NOTE: Due to technical problems, Proteus sp. will NOT be  reported as part of the BCID panel until further notice.  Organism identification BLOOD CULTURE PCR  Staphylococcus sp.,coag neg  Specimen source BLOOD PERIPHERAL      RADIOLOGY & ADDITIONAL TESTS:    Imaging Personally Reviewed:    Consultant(s) Notes Reviewed:      Care Discussed with Consultants/Other Providers:
Patient is a 77y old  Female who presents with a chief complaint of BL LE venous stasis (19 Jun 2018 17:45)      SUBJECTIVE / OVERNIGHT EVENTS:   Feels better.  Denies CP/SOB/Palpitation/HA.    MEDICATIONS  (STANDING):  allopurinol 100 milliGRAM(s) Oral daily  atorvastatin 20 milliGRAM(s) Oral at bedtime  buDESOnide  80 MICROgram(s)/formoterol 4.5 MICROgram(s) Inhaler 2 Puff(s) Inhalation two times a day  cephalexin 500 milliGRAM(s) Oral every 6 hours  enoxaparin Injectable 40 milliGRAM(s) SubCutaneous every 24 hours  furosemide    Tablet 40 milliGRAM(s) Oral daily  gabapentin 800 milliGRAM(s) Oral every 8 hours  potassium chloride    Tablet ER 20 milliEquivalent(s) Oral daily  tiotropium 18 MICROgram(s) Capsule 1 Capsule(s) Inhalation daily  ursodiol Capsule 300 milliGRAM(s) Oral three times a day    MEDICATIONS  (PRN):  acetaminophen   Tablet. 650 milliGRAM(s) Oral every 6 hours PRN Mild Pain (1 - 3)  ALBUTerol    90 MICROgram(s) HFA Inhaler 2 Puff(s) Inhalation every 6 hours PRN Shortness of Breath and/or Wheezing        CAPILLARY BLOOD GLUCOSE        I&O's Summary      PHYSICAL EXAM:  GENERAL: NAD, well-developed  HEAD:  Atraumatic, Normocephalic  NECK: Supple, No JVD  CHEST/LUNG: Clear to auscultation bilaterally; No wheezing.  HEART: Regular rate and rhythm; No murmurs, rubs, or gallops  ABDOMEN: Soft, Nontender, Nondistended; Bowel sounds present  EXTREMITIES:   No clubbing, cyanosis, or edema  NEUROLOGY: AAO X 3  SKIN: No rashes    LABS:                        10.3   5.02  )-----------( 163      ( 26 Jun 2018 06:15 )             31.8     06-26    135  |  100  |  29<H>  ----------------------------<  95  3.9   |  25  |  1.06    Ca    8.5      26 Jun 2018 06:15              CAPILLARY BLOOD GLUCOSE                    RADIOLOGY & ADDITIONAL TESTS:    Imaging Personally Reviewed:    Consultant(s) Notes Reviewed:      Care Discussed with Consultants/Other Providers:
Patient is a 77y old  Female who presents with a chief complaint of BL LE venous stasis (19 Jun 2018 17:45)      SUBJECTIVE / OVERNIGHT EVENTS:   Feels better.  Denies CP/SOB/Palpitation/HA.    MEDICATIONS  (STANDING):  allopurinol 100 milliGRAM(s) Oral daily  atorvastatin 20 milliGRAM(s) Oral at bedtime  buDESOnide  80 MICROgram(s)/formoterol 4.5 MICROgram(s) Inhaler 2 Puff(s) Inhalation two times a day  cephalexin 500 milliGRAM(s) Oral every 6 hours  enoxaparin Injectable 40 milliGRAM(s) SubCutaneous every 24 hours  furosemide    Tablet 40 milliGRAM(s) Oral daily  gabapentin 800 milliGRAM(s) Oral every 8 hours  potassium chloride    Tablet ER 20 milliEquivalent(s) Oral daily  tiotropium 18 MICROgram(s) Capsule 1 Capsule(s) Inhalation daily  ursodiol Capsule 300 milliGRAM(s) Oral three times a day    MEDICATIONS  (PRN):  acetaminophen   Tablet. 650 milliGRAM(s) Oral every 6 hours PRN Mild Pain (1 - 3)  ALBUTerol    90 MICROgram(s) HFA Inhaler 2 Puff(s) Inhalation every 6 hours PRN Shortness of Breath and/or Wheezing        CAPILLARY BLOOD GLUCOSE        I&O's Summary    21 Jun 2018 07:01  -  21 Jun 2018 23:24  --------------------------------------------------------  IN: 350 mL / OUT: 0 mL / NET: 350 mL        PHYSICAL EXAM:  GENERAL: NAD, well-developed  HEAD:  Atraumatic, Normocephalic  NECK: Supple, No JVD  CHEST/LUNG: Clear to auscultation bilaterally; No wheezing.  HEART: Regular rate and rhythm; No murmurs, rubs, or gallops  ABDOMEN: Soft, Nontender, Nondistended; Bowel sounds present  EXTREMITIES:   No clubbing, cyanosis, or edema  NEUROLOGY: AAO X 3  SKIN: No rashes    LABS:                        9.4    6.49  )-----------( 99       ( 20 Jun 2018 06:30 )             29.6     06-21    138  |  103  |  18  ----------------------------<  86  3.5   |  24  |  1.10    Ca    8.2<L>      21 Jun 2018 05:23              CAPILLARY BLOOD GLUCOSE        06-18 @ 20:51  Culture-urine --  Culture results --  method type PCR  Organism BLOOD CULTURE PCR  Organism Identification BLOOD CULTURE PCR  Specimen source BLOOD PERIPHERAL           06-18 @ 20:51  Culture blood   NO ORGANISMS ISOLATED  NO ORGANISMS ISOLATED AT 72 HRS.  Culture results --  Gram stain --  Gram stain blood   ***** CRITICAL RESULT *****  PERSON CALLED / READ-BACK: ROLA BEASLEY RN  DATE / TIME CALLED: 06/21/18 0602  CALLED BY: YOLANDA BRADFORD  GPCCL^Gram Pos Cocci In Clusters  AFTER: 55 HOURS INCUBATION  BOTTLE: ANAEROBIC BOTTLE  Method type PCR  Organism BLOOD CULTURE PCR  Organism identification BLOOD CULTURE PCR  Specimen source BLOOD PERIPHERAL      RADIOLOGY & ADDITIONAL TESTS:    Imaging Personally Reviewed:    Consultant(s) Notes Reviewed:      Care Discussed with Consultants/Other Providers:
Patient is a 77y old  Female who presents with a chief complaint of BL LE venous stasis (19 Jun 2018 17:45)      SUBJECTIVE / OVERNIGHT EVENTS:   Feels better.  Denies CP/SOB/Palpitation/HA.    MEDICATIONS  (STANDING):  allopurinol 100 milliGRAM(s) Oral daily  atorvastatin 20 milliGRAM(s) Oral at bedtime  buDESOnide  80 MICROgram(s)/formoterol 4.5 MICROgram(s) Inhaler 2 Puff(s) Inhalation two times a day  cephalexin 500 milliGRAM(s) Oral every 6 hours  enoxaparin Injectable 40 milliGRAM(s) SubCutaneous every 24 hours  furosemide    Tablet 40 milliGRAM(s) Oral daily  gabapentin 800 milliGRAM(s) Oral every 8 hours  potassium chloride    Tablet ER 20 milliEquivalent(s) Oral daily  tiotropium 18 MICROgram(s) Capsule 1 Capsule(s) Inhalation daily  ursodiol Capsule 300 milliGRAM(s) Oral three times a day    MEDICATIONS  (PRN):  acetaminophen   Tablet. 650 milliGRAM(s) Oral every 6 hours PRN Mild Pain (1 - 3)  ALBUTerol    90 MICROgram(s) HFA Inhaler 2 Puff(s) Inhalation every 6 hours PRN Shortness of Breath and/or Wheezing        CAPILLARY BLOOD GLUCOSE        I&O's Summary    22 Jun 2018 07:01  -  23 Jun 2018 07:00  --------------------------------------------------------  IN: 150 mL / OUT: 0 mL / NET: 150 mL    23 Jun 2018 07:01  -  23 Jun 2018 21:00  --------------------------------------------------------  IN: 720 mL / OUT: 0 mL / NET: 720 mL        PHYSICAL EXAM:  GENERAL: NAD, well-developed  HEAD:  Atraumatic, Normocephalic  NECK: Supple, No JVD  CHEST/LUNG: Clear to auscultation bilaterally; No wheezing.  HEART: Regular rate and rhythm; No murmurs, rubs, or gallops  ABDOMEN: Soft, Nontender, Nondistended; Bowel sounds present  EXTREMITIES:   No clubbing, cyanosis, or edema  NEUROLOGY: AAO X 3  SKIN: No rashes    LABS:                        10.7   5.43  )-----------( 119      ( 22 Jun 2018 06:55 )             33.2     06-23    138  |  101  |  24<H>  ----------------------------<  94  3.5   |  25  |  1.04    Ca    8.4      23 Jun 2018 05:15  Phos  3.4     06-23  Mg     2.1     06-23              CAPILLARY BLOOD GLUCOSE        06-18 @ 20:51  Culture-urine --  Culture results --  method type PCR  Organism BLOOD CULTURE PCR  ***Blood Panel PCR results on this specimen are available  approximately 3 hours after the Gram stain result***  Gram stain, PCR, and/or culture results may not always  correspond due to difference in methodologies  ------------------------------------------------------------  This PCR assay was performed using [facility].  The  following targets are tested for:  Enterococcus, vancomycin  resistant enterococci, Listeria monocytogenes,  coagulase  negative staphylococci, S. aureus, methicillin resistant S.  aureus, Streptococcus agalactiae (Group B), S. pneumoniae,  S. pyogenes (Group A), Acinetobacter baumannii, Enterobacter  cloacae, E. coli, Klebsiella oxytoca, K. pneumoniae, Proteus  sp., Serratia marcescens, Haemophilus influenzae, Neisseria  meningitidis, Pseudomonas aeruginosa, Candida albicans, C.  glabrata, C. krusei, C. parapsilosis, C. tropicalis and the  KPC resistance gene.  **NOTE: Due to technical problems, Proteus sp. will NOT be  reported as part of the BCID panel until further notice.  Organism Identification BLOOD CULTURE PCR  Staphylococcus sp.,coag neg  Specimen source BLOOD PERIPHERAL           06-18 @ 20:51  Culture blood   NO ORGANISMS ISOLATED  Culture results --  Gram stain --  Gram stain blood   ***** CRITICAL RESULT *****  PERSON CALLED / READ-BACK: ROLA BEASLEY RN  DATE / TIME CALLED: 06/21/18 0602  CALLED BY: YOLANDA BRADFORD  GPCCL^Gram Pos Cocci In Clusters  AFTER: 55 HOURS INCUBATION  BOTTLE: ANAEROBIC BOTTLE  Method type PCR  Organism BLOOD CULTURE PCR  ***Blood Panel PCR results on this specimen are available  approximately 3 hours after the Gram stain result***  Gram stain, PCR, and/or culture results may not always  correspond due to difference in methodologies  ------------------------------------------------------------  This PCR assay was performed using [facility].  The  following targets are tested for:  Enterococcus, vancomycin  resistant enterococci, Listeria monocytogenes,  coagulase  negative staphylococci, S. aureus, methicillin resistant S.  aureus, Streptococcus agalactiae (Group B), S. pneumoniae,  S. pyogenes (Group A), Acinetobacter baumannii, Enterobacter  cloacae, E. coli, Klebsiella oxytoca, K. pneumoniae, Proteus  sp., Serratia marcescens, Haemophilus influenzae, Neisseria  meningitidis, Pseudomonas aeruginosa, Candida albicans, C.  glabrata, C. krusei, C. parapsilosis, C. tropicalis and the  KPC resistance gene.  **NOTE: Due to technical problems, Proteus sp. will NOT be  reported as part of the BCID panel until further notice.  Organism identification BLOOD CULTURE PCR  Staphylococcus sp.,coag neg  Specimen source BLOOD PERIPHERAL      RADIOLOGY & ADDITIONAL TESTS:    Imaging Personally Reviewed:    Consultant(s) Notes Reviewed:      Care Discussed with Consultants/Other Providers:
Patient is a 77y old  Female who presents with a chief complaint of BL LE venous stasis (19 Jun 2018 17:45)      SUBJECTIVE / OVERNIGHT EVENTS:   Feels better.  Denies CP/SOB/Palpitation/HA.    MEDICATIONS  (STANDING):  allopurinol 100 milliGRAM(s) Oral daily  atorvastatin 20 milliGRAM(s) Oral at bedtime  buDESOnide  80 MICROgram(s)/formoterol 4.5 MICROgram(s) Inhaler 2 Puff(s) Inhalation two times a day  cephalexin 500 milliGRAM(s) Oral every 6 hours  enoxaparin Injectable 40 milliGRAM(s) SubCutaneous every 24 hours  furosemide    Tablet 40 milliGRAM(s) Oral daily  gabapentin 800 milliGRAM(s) Oral every 8 hours  potassium chloride    Tablet ER 20 milliEquivalent(s) Oral daily  tiotropium 18 MICROgram(s) Capsule 1 Capsule(s) Inhalation daily  ursodiol Capsule 300 milliGRAM(s) Oral three times a day    MEDICATIONS  (PRN):  acetaminophen   Tablet. 650 milliGRAM(s) Oral every 6 hours PRN Mild Pain (1 - 3)  ALBUTerol    90 MICROgram(s) HFA Inhaler 2 Puff(s) Inhalation every 6 hours PRN Shortness of Breath and/or Wheezing        CAPILLARY BLOOD GLUCOSE        I&O's Summary    23 Jun 2018 07:01  -  24 Jun 2018 07:00  --------------------------------------------------------  IN: 720 mL / OUT: 0 mL / NET: 720 mL    24 Jun 2018 07:01  -  24 Jun 2018 23:54  --------------------------------------------------------  IN: 720 mL / OUT: 0 mL / NET: 720 mL        PHYSICAL EXAM:  GENERAL: NAD, well-developed  HEAD:  Atraumatic, Normocephalic  NECK: Supple, No JVD  CHEST/LUNG: Clear to auscultation bilaterally; No wheezing.  HEART: Regular rate and rhythm; No murmurs, rubs, or gallops  ABDOMEN: Soft, Nontender, Nondistended; Bowel sounds present  EXTREMITIES:   No clubbing, cyanosis, or edema  NEUROLOGY: AAO X 3  SKIN: No rashes    LABS:    06-23    138  |  101  |  24<H>  ----------------------------<  94  3.5   |  25  |  1.04    Ca    8.4      23 Jun 2018 05:15  Phos  3.4     06-23  Mg     2.1     06-23              CAPILLARY BLOOD GLUCOSE        06-18 @ 20:51  Culture-urine --  Culture results --  method type PCR  Organism BLOOD CULTURE PCR  ***Blood Panel PCR results on this specimen are available  approximately 3 hours after the Gram stain result***  Gram stain, PCR, and/or culture results may not always  correspond due to difference in methodologies  ------------------------------------------------------------  This PCR assay was performed using [employer]  The  following targets are tested for:  Enterococcus, vancomycin  resistant enterococci, Listeria monocytogenes,  coagulase  negative staphylococci, S. aureus, methicillin resistant S.  aureus, Streptococcus agalactiae (Group B), S. pneumoniae,  S. pyogenes (Group A), Acinetobacter baumannii, Enterobacter  cloacae, E. coli, Klebsiella oxytoca, K. pneumoniae, Proteus  sp., Serratia marcescens, Haemophilus influenzae, Neisseria  meningitidis, Pseudomonas aeruginosa, Candida albicans, C.  glabrata, C. krusei, C. parapsilosis, C. tropicalis and the  KPC resistance gene.  **NOTE: Due to technical problems, Proteus sp. will NOT be  reported as part of the BCID panel until further notice.  Organism Identification BLOOD CULTURE PCR  Staphylococcus sp.,coag neg  Specimen source BLOOD PERIPHERAL           06-18 @ 20:51  Culture blood   NO ORGANISMS ISOLATED  Culture results --  Gram stain --  Gram stain blood   ***** CRITICAL RESULT *****  PERSON CALLED / READ-BACK: ROLA BEASLEY RN  DATE / TIME CALLED: 06/21/18 0602  CALLED BY: YOLANDA BRADFORD  GPCCL^Gram Pos Cocci In Clusters  AFTER: 55 HOURS INCUBATION  BOTTLE: ANAEROBIC BOTTLE  Method type PCR  Organism BLOOD CULTURE PCR  ***Blood Panel PCR results on this specimen are available  approximately 3 hours after the Gram stain result***  Gram stain, PCR, and/or culture results may not always  correspond due to difference in methodologies  ------------------------------------------------------------  This PCR assay was performed using [employer]  The  following targets are tested for:  Enterococcus, vancomycin  resistant enterococci, Listeria monocytogenes,  coagulase  negative staphylococci, S. aureus, methicillin resistant S.  aureus, Streptococcus agalactiae (Group B), S. pneumoniae,  S. pyogenes (Group A), Acinetobacter baumannii, Enterobacter  cloacae, E. coli, Klebsiella oxytoca, K. pneumoniae, Proteus  sp., Serratia marcescens, Haemophilus influenzae, Neisseria  meningitidis, Pseudomonas aeruginosa, Candida albicans, C.  glabrata, C. krusei, C. parapsilosis, C. tropicalis and the  KPC resistance gene.  **NOTE: Due to technical problems, Proteus sp. will NOT be  reported as part of the BCID panel until further notice.  Organism identification BLOOD CULTURE PCR  Staphylococcus sp.,coag neg  Specimen source BLOOD PERIPHERAL      RADIOLOGY & ADDITIONAL TESTS:    Imaging Personally Reviewed:    Consultant(s) Notes Reviewed:      Care Discussed with Consultants/Other Providers:
Patient is a 77y old  Female who presents with a chief complaint of BL LE venous stasis (19 Jun 2018 17:45)      SUBJECTIVE / OVERNIGHT EVENTS:   Feels better.  Denies CP/SOB/Palpitation/HA.    MEDICATIONS  (STANDING):  allopurinol 100 milliGRAM(s) Oral daily  atorvastatin 20 milliGRAM(s) Oral at bedtime  buDESOnide  80 MICROgram(s)/formoterol 4.5 MICROgram(s) Inhaler 2 Puff(s) Inhalation two times a day  enoxaparin Injectable 40 milliGRAM(s) SubCutaneous every 24 hours  furosemide    Tablet 40 milliGRAM(s) Oral daily  gabapentin 400 milliGRAM(s) Oral three times a day  piperacillin/tazobactam IVPB. 3.375 Gram(s) IV Intermittent every 8 hours  potassium chloride    Tablet ER 20 milliEquivalent(s) Oral daily  tiotropium 18 MICROgram(s) Capsule 1 Capsule(s) Inhalation daily  ursodiol Capsule 300 milliGRAM(s) Oral three times a day    MEDICATIONS  (PRN):  ALBUTerol    90 MICROgram(s) HFA Inhaler 2 Puff(s) Inhalation every 6 hours PRN Shortness of Breath and/or Wheezing        CAPILLARY BLOOD GLUCOSE        I&O's Summary      PHYSICAL EXAM:  GENERAL: NAD, well-developed  HEAD:  Atraumatic, Normocephalic  NECK: Supple, No JVD  CHEST/LUNG: Clear to auscultation bilaterally; No wheezing.  HEART: Regular rate and rhythm; No murmurs, rubs, or gallops  ABDOMEN: Soft, Nontender, Nondistended; Bowel sounds present  EXTREMITIES:   No clubbing, cyanosis, or edema  NEUROLOGY: AAO X 3  SKIN: No rashes    LABS:                        9.4    6.49  )-----------( 99       ( 20 Jun 2018 06:30 )             29.6     06-20    137  |  101  |  19  ----------------------------<  131<H>  3.0<L>   |  24  |  1.14    Ca    8.7      20 Jun 2018 08:03      PT/INR - ( 19 Jun 2018 06:25 )   PT: 11.3 SEC;   INR: 1.02          PTT - ( 19 Jun 2018 06:25 )  PTT:25.4 SEC        CAPILLARY BLOOD GLUCOSE        06-18 @ 20:51  Culture-urine --  Culture results --  method type --  Organism --  Organism Identification --  Specimen source BLOOD PERIPHERAL           06-18 @ 20:51  Culture blood   NO ORGANISMS ISOLATED  NO ORGANISMS ISOLATED AT 48 HRS.  Culture results --  Gram stain --  Gram stain blood --  Method type --  Organism --  Organism identification --  Specimen source BLOOD PERIPHERAL      RADIOLOGY & ADDITIONAL TESTS:    Imaging Personally Reviewed:    Consultant(s) Notes Reviewed:      Care Discussed with Consultants/Other Providers:

## 2018-06-26 NOTE — PROGRESS NOTE ADULT - ASSESSMENT
· Assessment	  76 yo f with venous stasis dermatitis      Problem/Plan - 1:  ·  Problem: Venous stasis dermatitis of both lower extremities.  Plan: - PO Keflex  ID/Wound care f/up noted.     Problem/Plan - 2:  ·  Problem: Chronic obstructive pulmonary disease, unspecified COPD type.  Plan: - Neb/  -albuterol prn.     Neuropathy:  Neurontin    COPD:   Stable.  Symbicort.    HTN:  Lasix    DC planning.
77 year old with COPD presents with posterior left leg ulcer.    At this time the ulcer does not appear infected.  There is no surrounding cellulitis at this time.     Her blood culture was positive in 1 of 4 bottles for coagulase negative staph.   This is most consistent with a contaminant.    ? cellulitis at presentation.  Continue keflex 500 mg po q 6 through 6/25.  Will sign off. Call ID service with questions.
· Assessment	  76 yo f with venous stasis dermatitis      Problem/Plan - 1:  ·  Problem: Venous stasis dermatitis of both lower extremities.  Plan: - PO Keflex  ID eval noted./ Wound care f/up noted.     Problem/Plan - 2:  ·  Problem: Chronic obstructive pulmonary disease, unspecified COPD type.  Plan: - Neb/  -albuterol prn.     Neuropathy:  Neurontin    COPD:   Stable.  Symbicort.    HTN:  Lasix
· Assessment	  76 yo f with venous stasis dermatitis      Problem/Plan - 1:  ·  Problem: Venous stasis dermatitis of both lower extremities.  Plan: - PO Keflex  ID/Wound care f/up noted.     Problem/Plan - 2:  ·  Problem: Chronic obstructive pulmonary disease, unspecified COPD type.  Plan: - Neb/  -albuterol prn.     Neuropathy:  Neurontin    COPD:   Stable.  Symbicort.    HTN:  Lasix    DC planning.
· Assessment	  78 yo f with venous stasis dermatitis      Problem/Plan - 1:  ·  Problem: Venous stasis dermatitis of both lower extremities.  Plan: - IV zosyn.   ID eval/ Wound care f/up     Problem/Plan - 2:  ·  Problem: Chronic obstructive pulmonary disease, unspecified COPD type.  Plan: - Neb/  -albuterol prn.
· Assessment	  78 yo f with venous stasis dermatitis      Problem/Plan - 1:  ·  Problem: Venous stasis dermatitis of both lower extremities.  Plan: - PO Keflex  ID/Wound care f/up noted.     Problem/Plan - 2:  ·  Problem: Chronic obstructive pulmonary disease, unspecified COPD type.  Plan: - Neb/  -albuterol prn.     Neuropathy:  Neurontin    COPD:   Stable.  Symbicort.    HTN:  Lasix
· Assessment	  78 yo f with venous stasis dermatitis      Problem/Plan - 1:  ·  Problem: Venous stasis dermatitis of both lower extremities.  Plan: - PO Keflex  ID/Wound care f/up noted.     Problem/Plan - 2:  ·  Problem: Chronic obstructive pulmonary disease, unspecified COPD type.  Plan: - Neb/  -albuterol prn.     Neuropathy:  Neurontin    COPD:   Stable.  Symbicort.    HTN:  Lasix    DC planning.
· Assessment	  78 yo f with venous stasis dermatitis      Problem/Plan - 1:  ·  Problem: Venous stasis dermatitis of both lower extremities.  Plan: - PO Keflex  ID/Wound care f/up noted.     Problem/Plan - 2:  ·  Problem: Chronic obstructive pulmonary disease, unspecified COPD type.  Plan: - Neb/  -albuterol prn.     Neuropathy:  Neurontin    COPD:   Stable.  Symbicort.    HTN:  Lasix    DC planning.

## 2018-06-26 NOTE — PROGRESS NOTE ADULT - PROVIDER SPECIALTY LIST ADULT
Infectious Disease
Internal Medicine

## 2018-06-27 ENCOUNTER — APPOINTMENT (OUTPATIENT)
Dept: VASCULAR SURGERY | Facility: CLINIC | Age: 78
End: 2018-06-27
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
## 2020-03-21 ENCOUNTER — APPOINTMENT (OUTPATIENT)
Dept: CT IMAGING | Facility: CLINIC | Age: 80
End: 2020-03-21

## 2022-04-07 NOTE — ED PROVIDER NOTE - CHIEF COMPLAINT
How Severe Is Your Rash?: mild The patient is a 76y Female complaining of Is This A New Presentation, Or A Follow-Up?: Follow Up Rash

## 2022-11-01 NOTE — PATIENT PROFILE ADULT. - FUNCTIONAL SCREEN CURRENT LEVEL: DRESSING, MLM
85 year old male from home c/o slurred speech, difficulty swallowing, and orthopnea x1 month. Pt had recent MRI that revealed recent TIA's prior to symptoms beginning x1 month ago. As per daughter, she wanted pt further evaluated r/t worsening symptoms. Upon assessment, pt presents otherwise well-appearing. Pt denies further symptoms or complaints.
(2) assistive person

## 2023-12-07 NOTE — DISCHARGE NOTE ADULT - NSTOBACCOHOTLINE_GEN_A_NCS
patient is alert and oriented x 4, in room air. no s/s of respiratory distress noted; patient made complaints of nasal congestion and sore throat. patient denies chest pain, headache, difficulty in breathing or nausea; please see flow sheet for vital signs. [facility] Smokers Quitline ([id number])
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
## 2024-04-16 NOTE — ED ADULT NURSE NOTE - CAS EDN DISCHARGE ASSESSMENT
Introduction Text (Please End With A Colon): The following procedure was deferred:
X Size Of Lesion In Cm (Optional): 0
Procedure To Be Performed At Next Visit: Excision
Detail Level: Detailed
Alert and oriented to person, place and time

## 2024-08-08 NOTE — DISCHARGE NOTE ADULT - PLAN OF CARE
[facility] ([place])  Gastroenterology  Progress Note    Patient Name: Ha Li  MRN: [id number]  Admission Date: 8/7/2024  Hospital Length of Stay: 1 days  Code Status: Full Code   Attending Provider: Delfina Elder MD  Consulting Provider: Rome Rodriguez MD  Primary Care Physician: Rory Estrada MD  Principal Problem: REMINGTON (acute kidney injury)    Subjective:     Interval History:  This gent is feeling better and he denies nausea vomiting or signs of blood loss. He has no signs of peritonitis or visceral ischemia. We discussed the need for colonoscopy which can be done as an outpatient.    Review of Systems   Constitutional:  Positive for activity change and fatigue.   HENT:  Negative for congestion and trouble swallowing.    Eyes:  Negative for discharge and itching.   Respiratory:  Negative for apnea, choking and chest tightness.    Cardiovascular:  Negative for chest pain, palpitations and leg swelling.   Gastrointestinal:  Positive for abdominal distention. Negative for abdominal pain, anal bleeding, blood in stool, constipation, diarrhea, nausea and rectal pain.   Endocrine: Negative for cold intolerance and heat intolerance.   Genitourinary:  Negative for difficulty urinating and flank pain.   Musculoskeletal:  Positive for myalgias.   Allergic/Immunologic: Negative for environmental allergies and food allergies.   Neurological:  Positive for weakness. Negative for dizziness.   Hematological:  Negative for adenopathy. Does not bruise/bleed easily.   Psychiatric/Behavioral:  Negative for agitation and behavioral problems.      Objective:     Vital Signs (Most Recent):  Temp: 98.2 °F (36.8 °C) (08/08/24 1635)  Pulse: (!) 56 (08/08/24 1601)  Resp: 17 (08/08/24 1601)  BP: (!) 156/93 (08/08/24 1635)  SpO2: 99 % (08/08/24 1601) Vital Signs (24h Range):  Temp:  [97.9 °F (36.6 °C)-98.9 °F (37.2 °C)] 98.2 °F (36.8 °C)  Pulse:  [49-80] 56  Resp:  [16-18] 17  SpO2:  [95 %-99 %] 99 %  BP:  ()/(55-93) 156/93     Weight: 61.1 kg (134 lb 11.2 oz) (08/07/24 1805)  Body mass index is 18.79 kg/m².      Intake/Output Summary (Last 24 hours) at 8/8/2024 1732  Last data filed at 8/8/2024 1500  Gross per 24 hour   Intake 1096 ml   Output 1451 ml   Net -355 ml       Lines/Drains/Airways       Peripheral Intravenous Line  Duration                  Peripheral IV - Single Lumen 08/07/24 0326 20 G Left Forearm 1 day                    Physical Exam  Vitals and nursing note reviewed.   Constitutional:       General: He is not in acute distress.     Appearance: Normal appearance. He is not ill-appearing.   HENT:      Head: Normocephalic and atraumatic.      Nose: Nose normal. No congestion.      Mouth/Throat:      Mouth: Mucous membranes are moist.      Pharynx: No oropharyngeal exudate.   Eyes:      General: No scleral icterus.     Extraocular Movements: Extraocular movements intact.      Pupils: Pupils are equal, round, and reactive to light.   Cardiovascular:      Rate and Rhythm: Normal rate and regular rhythm.      Heart sounds: No murmur heard.  Pulmonary:      Effort: Pulmonary effort is normal.      Breath sounds: Normal breath sounds.   Abdominal:      General: Abdomen is flat. Bowel sounds are normal. There is no distension.      Palpations: Abdomen is soft. There is no mass.      Tenderness: There is no abdominal tenderness. There is no guarding.      Hernia: No hernia is present.   Musculoskeletal:         General: No swelling or tenderness. Normal range of motion.      Cervical back: Normal range of motion. No rigidity or tenderness.   Skin:     General: Skin is warm and dry.      Coloration: Skin is not jaundiced.   Neurological:      General: No focal deficit present.      Mental Status: He is alert and oriented to person, place, and time.   Psychiatric:         Mood and Affect: Mood normal.         Thought Content: Thought content normal.         Judgment: Judgment normal.         Significant  Labs:  WBC now normal, modest anemia      Significant Imaging:  CT scan reviewed    Assessment/Plan: This gent is feeling better as he has not bled and he has less abdominal discomfort. He has no acute visceral signs and he is modestly anemia. Colonoscopy as OP advised.      Active Diagnoses:    Diagnosis Date Noted POA    PRINCIPAL PROBLEM:  REMINGTON (acute kidney injury) [N17.9] 08/07/2024 Yes    Volume depletion, gastrointestinal loss [E86.9] 08/07/2024 Yes    Primary hypertension [I10] 08/07/2024 Yes    Blood in stool [K92.1] 08/07/2024 Yes    Metabolic acidosis, increased anion gap (IAG) [E87.29] 08/07/2024 Yes    Tobacco abuse [Z72.0] 03/02/2017 Yes    Hypercalcemia [E83.52] 03/01/2017 Yes      Problems Resolved During this Admission:           Thank you for your consult.     Rome Rodriguez MD  Gastroenterology  [facility]     5 day course of Tamiflu 75 mg twice daily You came into the hospital because of increased weakness, shortness of breath and cough. You were found to be influenza A positive and started on a 5 day course of Tamiflu. You also finished your 7 day course of Augmentin that you were prescribed during your recent admission to the hospital. You will need to follow up with your PCP and/or Pulmonologist, Dr. Boyle, within 1 week of discharge to monitor your symptoms. You should follow up with the wound care instruction listed below. You should follow up with the [facility] ([phone], [address] [place] [address]) or primary care MD for wound care/wrapping of lower legs.    Wound Care Instructions:   B/L LE: Cleanse with soap and water, pat dry. Apply Liquid barrier film to periwound skin of open ulcers. Cover with foam without border. Apply Sween 24 moisturizing lotion to dry/scaly skin of lower legs. Wrap with kerlix, and ACE bandage. Change daily while in hospital. Change very 72 hours at home.    B/L buttock and posterior thighs: Cleanse with skin cleanser, pat dry. Apply Antifungal barrier cream twice a day.    Continue low air loss bed therapy, continue to turn & reposition q2h, continue moisture management with antifungal barrier creams & single breathable pad, continue measures to decrease friction/shear/pressure. You came into the hospital because of increased weakness, shortness of breath and cough. You were found to be influenza A positive and started on a 5 day course of Tamiflu (until February 6th). You also finished your 7 day course of Augmentin that you were prescribed during your recent admission to the hospital. You will need to follow up with your PCP and/or Pulmonologist, Dr. Boyle, within 1 week of discharge to monitor your symptoms.

## 2024-09-11 NOTE — PHYSICAL THERAPY INITIAL EVALUATION ADULT - THERAPY FREQUENCY, PT EVAL
Cardiology Follow Up    Bro Roldan  1974  [id number]  [facility]  [address]  [place] [address] [phone]    1. PE (pulmonary thromboembolism) (HCC)        2. Essential hypertension  Ambulatory Referral to Cardiology      3. Biatrial enlargement  Ambulatory Referral to Cardiology      4. LVH (left ventricular hypertrophy)  Ambulatory Referral to Cardiology      5. Aortic root dilatation (HCC)  Ambulatory Referral to Cardiology      6. Palpitations  Ambulatory Referral to Cardiology      7. NEELY (dyspnea on exertion)  Ambulatory Referral to Cardiology      8. Other fatigue  Ambulatory Referral to Cardiology      9. JORGE (obstructive sleep apnea)  Ambulatory Referral to Cardiology      10. Lightheadedness  Ambulatory Referral to Cardiology      11. Peripheral edema              Discussion/Summary:***      Interval History: ***    Patient Active Problem List   Diagnosis    Asthma, currently inactive    Essential hypertension    Gastroesophageal reflux disease    Hypercholesterolemia    Lumbar spine pain    Severe obesity (HCC)    Arthralgia of both knees    Attention deficit hyperactivity disorder (ADHD), predominantly inattentive type    Peripheral edema    JORGE (obstructive sleep apnea)    PE (pulmonary thromboembolism) (HCC)     Past Medical History:   Diagnosis Date    Allergic     Anxiety     Arthritis     Asthma     Depression     GERD (gastroesophageal reflux disease)     Hypertension     Inflammatory bowel disease     Obesity     Visual impairment      Social History     Socioeconomic History    Marital status: Single     Spouse name: Not on file    Number of children: Not on file    Years of education: Not on file    Highest education level: Not on file   Occupational History    Not on file   Tobacco Use    Smoking status: Former     Passive exposure: Never    Smokeless tobacco: Never   Vaping Use    Vaping  status: Never Used   Substance and Sexual Activity    Alcohol use: Not Currently     Comment: occasional    Drug use: Yes     Types: Marijuana     Comment: Medical Marijuana    Sexual activity: Not Currently     Partners: Female   Other Topics Concern    Not on file   Social History Narrative    Not on file     Social Determinants of Health     Financial Resource Strain: Not on file   Food Insecurity: No Food Insecurity (8/9/2024)    Hunger Vital Sign     Worried About Running Out of Food in the Last Year: Never true     Ran Out of Food in the Last Year: Never true   Transportation Needs: No Transportation Needs (8/9/2024)    PRAPARE - Transportation     Lack of Transportation (Medical): No     Lack of Transportation (Non-Medical): No   Physical Activity: Not on file   Stress: Not on file   Social Connections: Unknown (6/18/2024)    Received from [facility]     How often do you feel lonely or isolated from those around you? (Adult - for ages 18 years and over): Not on file   Intimate Partner Violence: Not on file   Housing Stability: Low Risk  (8/9/2024)    Housing Stability Vital Sign     Unable to Pay for Housing in the Last Year: No     Number of Times Moved in the Last Year: 0     Homeless in the Last Year: No      Family History   Problem Relation Age of Onset    Diabetes Maternal Grandmother     Diabetes Maternal Grandfather     Cancer Paternal Grandfather      Past Surgical History:   Procedure Laterality Date    EYE SURGERY      2021/2022    KNEE SURGERY  2006       Current Outpatient Medications:     albuterol (ProAir HFA) 90 mcg/act inhaler, Inhale 1 puff every 4 (four) hours as needed for wheezing or shortness of breath, Disp: 20.1 g, Rfl: 3    amphetamine-dextroamphetamine (ADDERALL) 20 mg tablet, take 1 tablet by mouth twice a day (Patient taking differently: Take 10 mg by mouth 3 (three) times a day), Disp: 30 tablet, Rfl: 0    apixaban (Eliquis) 5 mg, Take 1 tablet (5 mg total) by  mouth 2 (two) times a day Take 10 mg twice daily for 7 days (through 8/15) then on 8/16 start taking 5 mg twice daily., Disp: 120 tablet, Rfl: 0    cetirizine (ZyrTEC) 10 mg tablet, Take 10 mg by mouth every morning, Disp: , Rfl:     diltiazem (CARDIZEM CD) 180 mg 24 hr capsule, Take 1 capsule (180 mg total) by mouth daily, Disp: 100 capsule, Rfl: 3    dorzolamide (TRUSOPT) 2 % ophthalmic solution, Administer 1 drop into the left eye 2 (two) times a day, Disp: , Rfl:     famotidine (PEPCID) 40 MG tablet, Take 1 tablet (40 mg total) by mouth every morning (Patient taking differently: Take 40 mg by mouth every evening), Disp: 90 tablet, Rfl: 3    fluticasone (FLONASE) 50 mcg/act nasal spray, 1 spray into each nostril daily, Disp: 16 g, Rfl: 0    guaiFENesin (Mucinex) 600 mg 12 hr tablet, Take 1 tablet (600 mg total) by mouth every 12 (twelve) hours, Disp: 20 tablet, Rfl: 0    guaifenesin-codeine (GUAIFENESIN AC) 100-10 MG/5ML liquid, Take 5 mL by mouth 3 (three) times a day as needed for cough, Disp: 120 mL, Rfl: 0    hydrOXYzine HCL (ATARAX) 25 mg tablet, Take 1 tablet (25 mg total) by mouth 3 (three) times a day as needed for anxiety, Disp: 90 tablet, Rfl: 3    metoprolol succinate (TOPROL-XL) 50 mg 24 hr tablet, Take 50 mg by mouth daily, Disp: , Rfl:     Multiple Vitamin (multivitamin) tablet, Take 1 tablet by mouth daily, Disp: , Rfl:     naproxen (NAPROSYN) 500 mg tablet, Take 1 tablet (500 mg total) by mouth 2 (two) times a day as needed for moderate pain, Disp: 180 tablet, Rfl: 0    Omega-3 Fatty Acids (fish oil) 1,000 mg, Take 2 capsules (2,000 mg total) by mouth daily, Disp: 60 capsule, Rfl: 11    omeprazole (PriLOSEC) 40 MG capsule, Take 1 capsule (40 mg total) by mouth daily (Patient taking differently: Take 40 mg by mouth every evening), Disp: 90 capsule, Rfl: 3    Rosuvastatin Calcium 20 MG CPSP, Take 20 mg by mouth in the morning, Disp: 90 capsule, Rfl: 30    valsartan (DIOVAN) 160 mg tablet, Take 160  "mg by mouth daily, Disp: , Rfl:     zolpidem (AMBIEN) 5 mg tablet, Take 1 tablet (5 mg total) by mouth daily at bedtime as needed for sleep, Disp: 30 tablet, Rfl: 0  Allergies   Allergen Reactions    Cat Hair Extract     Pollen Extract      Vitals:    09/10/24 1341   BP: 114/84   Pulse: 67   SpO2: 99%   Weight: 133 kg (292 lb 6.4 oz)   Height: 6' 4\" (1.93 m)     Weight (last 2 days)       Date/Time Weight    09/10/24 1341 133 (292.4)           Blood pressure 114/84, pulse 67, height 6' 4\" (1.93 m), weight 133 kg (292 lb 6.4 oz), SpO2 99%., Body mass index is 35.59 kg/m².    Labs:{Recent labs:19471::\"not applicable\"}  Imaging: No results found.  EKG:***  Review of Systems:  Review of Systems    Physical Exam:  Physical Exam    " 3-5x/week

## 2024-12-22 NOTE — H&P ADULT - ATTENDING COMMENTS
Patient is a 77 year old female with PMHx significant for HLD, COPD, GERD,  Lipodermatosclerosis secondary to chronic venous insufficiency with hx of recurrent LE cellulitis presents for influenza A pneumonia and s/p fall at home due to LE weakness likely from deconditioning  - start Tamiflu  - PT eval  - continue home meds    Remainder of plan as discussed above absent

## 2025-04-01 NOTE — PHYSICAL THERAPY INITIAL EVALUATION ADULT - FOLLOWS COMMANDS/ANSWERS QUESTIONS, REHAB EVAL
100% of the time Pt is not a reliable historian; will clarify PLOF with Adult Home staff or family member